# Patient Record
Sex: MALE | Race: WHITE | NOT HISPANIC OR LATINO | Employment: OTHER | URBAN - METROPOLITAN AREA
[De-identification: names, ages, dates, MRNs, and addresses within clinical notes are randomized per-mention and may not be internally consistent; named-entity substitution may affect disease eponyms.]

---

## 2020-12-16 ENCOUNTER — NON-PROVIDER VISIT (OUTPATIENT)
Dept: URGENT CARE | Facility: PHYSICIAN GROUP | Age: 70
End: 2020-12-16

## 2020-12-16 DIAGNOSIS — Z02.1 PRE-EMPLOYMENT DRUG SCREENING: ICD-10-CM

## 2020-12-16 LAB
AMP AMPHETAMINE: NORMAL
COC COCAINE: NORMAL
INT CON NEG: NORMAL
INT CON POS: NORMAL
MET METHAMPHETAMINES: NORMAL
OPI OPIATES: NORMAL
PCP PHENCYCLIDINE: NORMAL
POC DRUG COMMENT 753798-OCCUPATIONAL HEALTH: NEGATIVE
THC: NORMAL

## 2020-12-16 PROCEDURE — 80305 DRUG TEST PRSMV DIR OPT OBS: CPT | Performed by: NURSE PRACTITIONER

## 2021-01-15 DIAGNOSIS — Z23 NEED FOR VACCINATION: ICD-10-CM

## 2021-02-11 ENCOUNTER — HOSPITAL ENCOUNTER (EMERGENCY)
Facility: MEDICAL CENTER | Age: 71
End: 2021-02-11
Attending: EMERGENCY MEDICINE
Payer: MEDICARE

## 2021-02-11 ENCOUNTER — PHARMACY VISIT (OUTPATIENT)
Dept: PHARMACY | Facility: MEDICAL CENTER | Age: 71
End: 2021-02-11
Payer: COMMERCIAL

## 2021-02-11 VITALS
WEIGHT: 201.72 LBS | BODY MASS INDEX: 25.89 KG/M2 | HEIGHT: 74 IN | HEART RATE: 89 BPM | OXYGEN SATURATION: 99 % | RESPIRATION RATE: 16 BRPM | DIASTOLIC BLOOD PRESSURE: 84 MMHG | TEMPERATURE: 97.2 F | SYSTOLIC BLOOD PRESSURE: 130 MMHG

## 2021-02-11 DIAGNOSIS — Z76.0 MEDICATION REFILL: ICD-10-CM

## 2021-02-11 PROCEDURE — RXMED WILLOW AMBULATORY MEDICATION CHARGE: Performed by: EMERGENCY MEDICINE

## 2021-02-11 PROCEDURE — 99283 EMERGENCY DEPT VISIT LOW MDM: CPT

## 2021-02-11 RX ORDER — SPIRONOLACTONE AND HYDROCHLOROTHIAZIDE 25; 25 MG/1; MG/1
1 TABLET ORAL
COMMUNITY
End: 2021-02-11 | Stop reason: SDUPTHER

## 2021-02-11 RX ORDER — SPIRONOLACTONE AND HYDROCHLOROTHIAZIDE 25; 25 MG/1; MG/1
1 TABLET ORAL DAILY
Qty: 120 TABLET | Refills: 0 | Status: SHIPPED
Start: 2021-02-11 | End: 2024-01-25

## 2021-02-11 RX ORDER — LISINOPRIL 20 MG/1
20 TABLET ORAL DAILY
Qty: 30 TABLET | Refills: 0 | Status: SHIPPED
Start: 2021-02-11 | End: 2024-01-25

## 2021-02-11 RX ORDER — LISINOPRIL 20 MG/1
20 TABLET ORAL DAILY
COMMUNITY
End: 2021-02-11 | Stop reason: SDUPTHER

## 2021-02-11 RX ORDER — SPIRONOLACTONE AND HYDROCHLOROTHIAZIDE 25; 25 MG/1; MG/1
1 TABLET ORAL DAILY
Qty: 120 TABLET | Refills: 0 | Status: SHIPPED | OUTPATIENT
Start: 2021-02-11 | End: 2021-02-11 | Stop reason: SDUPTHER

## 2021-02-11 RX ORDER — LISINOPRIL 20 MG/1
20 TABLET ORAL DAILY
Qty: 30 TABLET | Refills: 0 | Status: SHIPPED | OUTPATIENT
Start: 2021-02-11 | End: 2021-02-11 | Stop reason: SDUPTHER

## 2021-02-11 NOTE — ED NOTES
.Patient discharged in stable condition per orders. Patient verbalized understanding of all discharge instructions. All belongings accounted for. Pt will  prescriptions at [facility]

## 2021-02-11 NOTE — ED NOTES
Pt states he can not afford to fill RXs until next week. Contacted social work for recommendations to fill RXs in the mean time

## 2021-02-11 NOTE — ED TRIAGE NOTES
"Chief Complaint   Patient presents with   • Medication Refill     Ran out of HTN RX two days ago     /93   Pulse 99   Temp 36.6 °C (97.9 °F) (Temporal)   Resp 20   Ht 1.88 m (6' 2\")   Wt 91.5 kg (201 lb 11.5 oz)   SpO2 95%   BMI 25.90 kg/m²     71 y/o male presents to ED requesting medication refill for his HCTZ and lisinopril. Patient states he ran out of the medications two days ago, but has been taking meds as prescribed. They were last filled in November when he was discharged from [facility]. He denies any other pain or medical complaints. VSS in triage.   "

## 2021-02-11 NOTE — DISCHARGE PLANNING
ALEXANDRIA called [facility] and was updated that Pt's medication cost is $17.76. ALEXANDRIA informed Pharmacy staff that this Pt would come and  his medications from the office.  Approved Services was completed and faxed to the [facility].  ALEXANDRIA noted that Pt has been advised to follow up with  [facility] for PCP and medications. ALEXANDRIA updated ER RN with plan.

## 2021-02-11 NOTE — ED PROVIDER NOTES
"ED Provider Note    CHIEF COMPLAINT  Chief Complaint   Patient presents with   • Medication Refill     Ran out of HTN RX two days ago       HPI  Fausto Martin is a 70 y.o. male who presents with high blood pressure.  The patient states he has been out of his lisinopril and hydrochlorothiazide over the last 2 days.  He presents asking for a refill.  The patient has not any chest pain or difficulty breathing.  He is unaware of any recent fever or sick contacts.    REVIEW OF SYSTEMS  No lower extremity swelling or pain    PHYSICAL EXAM  VITAL SIGNS: /93   Pulse 99   Temp 36.6 °C (97.9 °F) (Temporal)   Resp 20   Ht 1.88 m (6' 2\")   Wt 91.5 kg (201 lb 11.5 oz)   SpO2 95%   BMI 25.90 kg/m²   In general the patient does not appear toxic    Pulmonary chest clear to auscultation bilaterally    Cardiovascular S1-S2 with regular rate and rhythm    Extremities no edema      COURSE & MEDICAL DECISION MAKING  Pertinent Labs & Imaging studies reviewed. (See chart for details)  This a 70-year-old male who presents the emerge department with hypertension.  Will prescribe the patient's lisinopril as well as hydrochlorothiazide.  He is instructed to call the [facility] for routine health maintenance and follow-up.    FINAL IMPRESSION  1.  Hypertension       Disposition  Disposition      Electronically signed by: Issa Mckinney M.D., 2/11/2021 12:51 PM      "

## 2021-12-02 ENCOUNTER — HOSPITAL ENCOUNTER (EMERGENCY)
Facility: MEDICAL CENTER | Age: 71
End: 2021-12-02
Attending: EMERGENCY MEDICINE
Payer: MEDICARE

## 2021-12-02 VITALS
DIASTOLIC BLOOD PRESSURE: 60 MMHG | HEIGHT: 72 IN | HEART RATE: 91 BPM | WEIGHT: 212.52 LBS | OXYGEN SATURATION: 99 % | SYSTOLIC BLOOD PRESSURE: 180 MMHG | BODY MASS INDEX: 28.79 KG/M2 | TEMPERATURE: 98 F | RESPIRATION RATE: 16 BRPM

## 2021-12-02 DIAGNOSIS — M54.50 ACUTE RIGHT-SIDED LOW BACK PAIN WITHOUT SCIATICA: ICD-10-CM

## 2021-12-02 DIAGNOSIS — K59.00 CONSTIPATION, UNSPECIFIED CONSTIPATION TYPE: ICD-10-CM

## 2021-12-02 LAB
ALBUMIN SERPL BCP-MCNC: 4.5 G/DL (ref 3.2–4.9)
ALBUMIN/GLOB SERPL: 1.3 G/DL
ALP SERPL-CCNC: 88 U/L (ref 30–99)
ALT SERPL-CCNC: 22 U/L (ref 2–50)
ANION GAP SERPL CALC-SCNC: 11 MMOL/L (ref 7–16)
APPEARANCE UR: CLEAR
AST SERPL-CCNC: 20 U/L (ref 12–45)
BACTERIA #/AREA URNS HPF: NEGATIVE /HPF
BASOPHILS # BLD AUTO: 0.6 % (ref 0–1.8)
BASOPHILS # BLD: 0.06 K/UL (ref 0–0.12)
BILIRUB SERPL-MCNC: 1.5 MG/DL (ref 0.1–1.5)
BILIRUB UR QL STRIP.AUTO: NEGATIVE
BUN SERPL-MCNC: 22 MG/DL (ref 8–22)
CALCIUM SERPL-MCNC: 9.5 MG/DL (ref 8.5–10.5)
CHLORIDE SERPL-SCNC: 104 MMOL/L (ref 96–112)
CO2 SERPL-SCNC: 26 MMOL/L (ref 20–33)
COLOR UR: YELLOW
CREAT SERPL-MCNC: 1.16 MG/DL (ref 0.5–1.4)
EOSINOPHIL # BLD AUTO: 0.04 K/UL (ref 0–0.51)
EOSINOPHIL NFR BLD: 0.4 % (ref 0–6.9)
EPI CELLS #/AREA URNS HPF: NEGATIVE /HPF
ERYTHROCYTE [DISTWIDTH] IN BLOOD BY AUTOMATED COUNT: 42.6 FL (ref 35.9–50)
GLOBULIN SER CALC-MCNC: 3.4 G/DL (ref 1.9–3.5)
GLUCOSE SERPL-MCNC: 124 MG/DL (ref 65–99)
GLUCOSE UR STRIP.AUTO-MCNC: NEGATIVE MG/DL
HCT VFR BLD AUTO: 51.1 % (ref 42–52)
HGB BLD-MCNC: 17.7 G/DL (ref 14–18)
HYALINE CASTS #/AREA URNS LPF: ABNORMAL /LPF
IMM GRANULOCYTES # BLD AUTO: 0.04 K/UL (ref 0–0.11)
IMM GRANULOCYTES NFR BLD AUTO: 0.4 % (ref 0–0.9)
KETONES UR STRIP.AUTO-MCNC: NEGATIVE MG/DL
LEUKOCYTE ESTERASE UR QL STRIP.AUTO: ABNORMAL
LIPASE SERPL-CCNC: 27 U/L (ref 11–82)
LYMPHOCYTES # BLD AUTO: 0.95 K/UL (ref 1–4.8)
LYMPHOCYTES NFR BLD: 9.6 % (ref 22–41)
MCH RBC QN AUTO: 32.3 PG (ref 27–33)
MCHC RBC AUTO-ENTMCNC: 34.6 G/DL (ref 33.7–35.3)
MCV RBC AUTO: 93.2 FL (ref 81.4–97.8)
MICRO URNS: ABNORMAL
MONOCYTES # BLD AUTO: 0.61 K/UL (ref 0–0.85)
MONOCYTES NFR BLD AUTO: 6.2 % (ref 0–13.4)
NEUTROPHILS # BLD AUTO: 8.18 K/UL (ref 1.82–7.42)
NEUTROPHILS NFR BLD: 82.8 % (ref 44–72)
NITRITE UR QL STRIP.AUTO: NEGATIVE
NRBC # BLD AUTO: 0 K/UL
NRBC BLD-RTO: 0 /100 WBC
PH UR STRIP.AUTO: 6.5 [PH] (ref 5–8)
PLATELET # BLD AUTO: 210 K/UL (ref 164–446)
PMV BLD AUTO: 11 FL (ref 9–12.9)
POTASSIUM SERPL-SCNC: 3.8 MMOL/L (ref 3.6–5.5)
PROT SERPL-MCNC: 7.9 G/DL (ref 6–8.2)
PROT UR QL STRIP: NEGATIVE MG/DL
RBC # BLD AUTO: 5.48 M/UL (ref 4.7–6.1)
RBC # URNS HPF: ABNORMAL /HPF
RBC UR QL AUTO: NEGATIVE
SODIUM SERPL-SCNC: 141 MMOL/L (ref 135–145)
SP GR UR STRIP.AUTO: 1.02
UROBILINOGEN UR STRIP.AUTO-MCNC: 1 MG/DL
WBC # BLD AUTO: 9.9 K/UL (ref 4.8–10.8)
WBC #/AREA URNS HPF: ABNORMAL /HPF

## 2021-12-02 PROCEDURE — 83690 ASSAY OF LIPASE: CPT

## 2021-12-02 PROCEDURE — 99285 EMERGENCY DEPT VISIT HI MDM: CPT

## 2021-12-02 PROCEDURE — 81001 URINALYSIS AUTO W/SCOPE: CPT

## 2021-12-02 PROCEDURE — 80053 COMPREHEN METABOLIC PANEL: CPT

## 2021-12-02 PROCEDURE — 87086 URINE CULTURE/COLONY COUNT: CPT

## 2021-12-02 PROCEDURE — 85025 COMPLETE CBC W/AUTO DIFF WBC: CPT

## 2021-12-02 RX ORDER — DOCUSATE SODIUM 100 MG/1
100 CAPSULE, LIQUID FILLED ORAL 2 TIMES DAILY
Qty: 60 CAPSULE | Refills: 0 | Status: SHIPPED | OUTPATIENT
Start: 2021-12-02 | End: 2024-01-25

## 2021-12-02 RX ORDER — METHYLPREDNISOLONE 4 MG/1
TABLET ORAL
Qty: 21 TABLET | Refills: 0 | Status: SHIPPED | OUTPATIENT
Start: 2021-12-02 | End: 2024-01-25

## 2021-12-02 ASSESSMENT — LIFESTYLE VARIABLES: DO YOU DRINK ALCOHOL: NO

## 2021-12-02 NOTE — ED PROVIDER NOTES
ED Provider Note    Scribed for Buck Diaz M.D. by Brent Ledezma. 12/2/2021  8:25 AM    Primary care provider: Ida Severino P.A.-C.  Means of arrival: Walk In  History obtained from: Patient  History limited by: None    CHIEF COMPLAINT  Chief Complaint   Patient presents with    Constipation    Low Back Pain    Abdominal Pain     HPI  Fausto Martin is a 71 y.o. male who presents to the Emergency Department for worsening constipation onset several months ago. He states his last bowel movement was 4 days ago. He has tried laxatives with minimal relief. The patient states he also has acute on chronic moderate to severe left low back pain that radiates to the right low back. The patient denies any significant event that exacerbated his low back pain. 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 He denies any major abdominal or back surgeries. No reports of radiating lower extremity pain.    REVIEW OF SYSTEMS  Pertinent positives include constipation, low back pain, abdominal pain.   Pertinent negatives include no radiating lower extremity pain.    All other systems reviewed and negative. See HPI for further details.     PAST MEDICAL HISTORY   has a past medical history of Hypertension.    SURGICAL HISTORY  patient denies any surgical history    SOCIAL HISTORY  Social History     Tobacco Use    Smoking status: Never Smoker    Smokeless tobacco: Never Used   Vaping Use    Vaping Use: Never used   Substance Use Topics    Alcohol use: Not Currently    Drug use: Never      Social History     Substance and Sexual Activity   Drug Use Never       FAMILY HISTORY  History reviewed. No pertinent family history.    CURRENT MEDICATIONS  Current Outpatient Medications   Medication Instructions    lisinopril (PRINIVIL) 20 mg, Oral, DAILY    spironolactone/hctz (ALDACTAZIDE) 25-25 MG Tab 1 Tablet, Oral, DAILY     ALLERGIES  No Known Allergies    PHYSICAL EXAM  VITAL SIGNS: BP (!) 188/113   Pulse 91    Temp (!) 35.6 °C (96.1 °F) (Temporal)   Resp 16   Ht 1.829 m (6')   Wt 96.4 kg (212 lb 8.4 oz)   SpO2 97%   BMI 28.82 kg/m²     Nursing note and vitals reviewed.  Constitutional: Well-developed and well-nourished. No distress.   HENT: Head is normocephalic and atraumatic. Oropharynx is clear and moist without exudate or erythema.   Eyes: Pupils are equal, round, and reactive to light. Conjunctiva are normal.   Cardiovascular: Normal rate and regular rhythm. No murmur heard. Normal radial pulses.  Pulmonary/Chest: Breath sounds normal. No wheezes or rales.   Abdominal: Soft and Cannot elicit any abdominal tenderness. No distention    Musculoskeletal: Extremities exhibit normal range of motion without edema.  Back: Tenderness in right lumbar paraspinal musculature. Apparent discomfort with movement.  Neurological: No saddle anesthesia. Awake, alert and oriented to person, place, and time. No focal deficits noted.  Skin: Skin is warm and dry. No rash.   Psychiatric: Normal mood and affect. Appropriate for clinical situation.    DIAGNOSTIC STUDIES / PROCEDURES    LABS  Results for orders placed or performed during the hospital encounter of 12/02/21   CBC WITH DIFFERENTIAL   Result Value Ref Range    WBC 9.9 4.8 - 10.8 K/uL    RBC 5.48 4.70 - 6.10 M/uL    Hemoglobin 17.7 14.0 - 18.0 g/dL    Hematocrit 51.1 42.0 - 52.0 %    MCV 93.2 81.4 - 97.8 fL    MCH 32.3 27.0 - 33.0 pg    MCHC 34.6 33.7 - 35.3 g/dL    RDW 42.6 35.9 - 50.0 fL    Platelet Count 210 164 - 446 K/uL    MPV 11.0 9.0 - 12.9 fL    Neutrophils-Polys 82.80 (H) 44.00 - 72.00 %    Lymphocytes 9.60 (L) 22.00 - 41.00 %    Monocytes 6.20 0.00 - 13.40 %    Eosinophils 0.40 0.00 - 6.90 %    Basophils 0.60 0.00 - 1.80 %    Immature Granulocytes 0.40 0.00 - 0.90 %    Nucleated RBC 0.00 /100 WBC    Neutrophils (Absolute) 8.18 (H) 1.82 - 7.42 K/uL    Lymphs (Absolute) 0.95 (L) 1.00 - 4.80 K/uL    Monos (Absolute) 0.61 0.00 - 0.85 K/uL    Eos (Absolute) 0.04 0.00 -  0.51 K/uL    Baso (Absolute) 0.06 0.00 - 0.12 K/uL    Immature Granulocytes (abs) 0.04 0.00 - 0.11 K/uL    NRBC (Absolute) 0.00 K/uL   COMP METABOLIC PANEL   Result Value Ref Range    Sodium 141 135 - 145 mmol/L    Potassium 3.8 3.6 - 5.5 mmol/L    Chloride 104 96 - 112 mmol/L    Co2 26 20 - 33 mmol/L    Anion Gap 11.0 7.0 - 16.0    Glucose 124 (H) 65 - 99 mg/dL    Bun 22 8 - 22 mg/dL    Creatinine 1.16 0.50 - 1.40 mg/dL    Calcium 9.5 8.5 - 10.5 mg/dL    AST(SGOT) 20 12 - 45 U/L    ALT(SGPT) 22 2 - 50 U/L    Alkaline Phosphatase 88 30 - 99 U/L    Total Bilirubin 1.5 0.1 - 1.5 mg/dL    Albumin 4.5 3.2 - 4.9 g/dL    Total Protein 7.9 6.0 - 8.2 g/dL    Globulin 3.4 1.9 - 3.5 g/dL    A-G Ratio 1.3 g/dL   LIPASE   Result Value Ref Range    Lipase 27 11 - 82 U/L   URINALYSIS    Specimen: Blood   Result Value Ref Range    Color Yellow     Character Clear     Specific Gravity 1.024 <1.035    Ph 6.5 5.0 - 8.0    Glucose Negative Negative mg/dL    Ketones Negative Negative mg/dL    Protein Negative Negative mg/dL    Bilirubin Negative Negative    Urobilinogen, Urine 1.0 Negative    Nitrite Negative Negative    Leukocyte Esterase Small (A) Negative    Occult Blood Negative Negative    Micro Urine Req Microscopic    URINE MICROSCOPIC (W/UA)   Result Value Ref Range    WBC 10-20 (A) /hpf    RBC 2-5 (A) /hpf    Bacteria Negative None /hpf    Epithelial Cells Negative /hpf    Hyaline Cast 0-2 /lpf   ESTIMATED GFR   Result Value Ref Range    GFR If African American >60 >60 mL/min/1.73 m 2    GFR If Non African American >60 >60 mL/min/1.73 m 2       All labs reviewed by me.    COURSE & MEDICAL DECISION MAKING  Nursing notes, VS, PMSFHx reviewed in chart.      8:25 AM - Patient seen and examined at bedside. Urine Culture, CBC w/ diff, CMP, Lipase, and UA were ordered in triage. I discussed the patient's diagnostic study results.  Laboratory studies are reassuring.  The patient has an entirely benign abdominal exam.  I discussed plan  for discharge and follow up as outlined below. He will be treated symptomatically for mechanical back pain and constipation. Patient will be discharged and prescribed magnesium citrate, Colace 100 mg Capsule, and Medrol 4 mg Tab. The patient verbalizes they feel comfortable going home. The patient is stable for discharge at this time and will return for any new or worsening symptoms. Patient verbalizes understanding and support with my plan for discharge.     The patient will return for new or worsening symptoms and is stable at the time of discharge.    DISPOSITION:  Patient will be discharged home in stable condition.    FOLLOW UP:  Ida Severino P.A.-C.  [address]  [place] [address]  [phone]    Schedule an appointment as soon as possible for a visit       [facility], Emergency Dept  [address] [facility] [id number] [phone]    If symptoms worsen      OUTPATIENT MEDICATIONS:  New Prescriptions    DOCUSATE SODIUM (COLACE) 100 MG CAP    Take 1 Capsule by mouth 2 times a day.    MAGNESIUM CITRATE SOLUTION    Take 300 mL by mouth one time for 1 dose.    METHYLPREDNISOLONE (MEDROL) 4 MG TAB    Take as per the package instructions.       FINAL IMPRESSION  1. Constipation, unspecified constipation type    2. Acute right-sided low back pain without sciatica          Brent LOPEZ (Priscilla), am scribing for, and in the presence of, Buck Diaz M.D..    Electronically signed by: Brent Ledezma (Priscilla), 12/2/2021    Buck LOPEZ M.D. personally performed the services described in this documentation, as scribed by Brent Ledezma in my presence, and it is both accurate and complete.    The note accurately reflects work and decisions made by me.  Buck Diaz M.D.  12/2/2021  2:32 PM

## 2021-12-02 NOTE — ED TRIAGE NOTES
Chief Complaint   Patient presents with   • Constipation   • Low Back Pain   • Abdominal Pain     Pt to ED from home c/o b/l lwr abd/back pain that waxes and wanes but has become more intense in the last 24 hours. Pt has not had a BM since Sunday c/o 10/10. Denies N/V/D.  Pt hypertensive in triage.     BP (!) 167/117 Comment: L arm  Pulse 91   Temp (!) 35.6 °C (96.1 °F) (Temporal)   Resp 16   Ht 1.829 m (6')   Wt 96.4 kg (212 lb 8.4 oz)   SpO2 97%   BMI 28.82 kg/m²

## 2021-12-04 LAB
BACTERIA UR CULT: NORMAL
SIGNIFICANT IND 70042: NORMAL
SITE SITE: NORMAL
SOURCE SOURCE: NORMAL

## 2021-12-10 ENCOUNTER — APPOINTMENT (OUTPATIENT)
Dept: RADIOLOGY | Facility: MEDICAL CENTER | Age: 71
End: 2021-12-10
Attending: EMERGENCY MEDICINE
Payer: MEDICARE

## 2021-12-10 ENCOUNTER — HOSPITAL ENCOUNTER (EMERGENCY)
Facility: MEDICAL CENTER | Age: 71
End: 2021-12-10
Attending: EMERGENCY MEDICINE
Payer: MEDICARE

## 2021-12-10 VITALS
TEMPERATURE: 98.1 F | HEART RATE: 88 BPM | BODY MASS INDEX: 28.73 KG/M2 | OXYGEN SATURATION: 93 % | SYSTOLIC BLOOD PRESSURE: 181 MMHG | WEIGHT: 212.08 LBS | RESPIRATION RATE: 18 BRPM | HEIGHT: 72 IN | DIASTOLIC BLOOD PRESSURE: 101 MMHG

## 2021-12-10 DIAGNOSIS — Z87.19 HISTORY OF CONSTIPATION: ICD-10-CM

## 2021-12-10 DIAGNOSIS — R10.84 GENERALIZED ABDOMINAL PAIN: ICD-10-CM

## 2021-12-10 LAB
ALBUMIN SERPL BCP-MCNC: 4.3 G/DL (ref 3.2–4.9)
ALBUMIN/GLOB SERPL: 1.6 G/DL
ALP SERPL-CCNC: 73 U/L (ref 30–99)
ALT SERPL-CCNC: 21 U/L (ref 2–50)
ANION GAP SERPL CALC-SCNC: 12 MMOL/L (ref 7–16)
AST SERPL-CCNC: 13 U/L (ref 12–45)
BASOPHILS # BLD AUTO: 0.5 % (ref 0–1.8)
BASOPHILS # BLD: 0.07 K/UL (ref 0–0.12)
BILIRUB SERPL-MCNC: 1.5 MG/DL (ref 0.1–1.5)
BUN SERPL-MCNC: 21 MG/DL (ref 8–22)
CALCIUM SERPL-MCNC: 9.1 MG/DL (ref 8.5–10.5)
CHLORIDE SERPL-SCNC: 103 MMOL/L (ref 96–112)
CO2 SERPL-SCNC: 23 MMOL/L (ref 20–33)
CREAT SERPL-MCNC: 1.14 MG/DL (ref 0.5–1.4)
EOSINOPHIL # BLD AUTO: 0.07 K/UL (ref 0–0.51)
EOSINOPHIL NFR BLD: 0.5 % (ref 0–6.9)
ERYTHROCYTE [DISTWIDTH] IN BLOOD BY AUTOMATED COUNT: 42.7 FL (ref 35.9–50)
GLOBULIN SER CALC-MCNC: 2.7 G/DL (ref 1.9–3.5)
GLUCOSE SERPL-MCNC: 103 MG/DL (ref 65–99)
HCT VFR BLD AUTO: 52.4 % (ref 42–52)
HGB BLD-MCNC: 17.7 G/DL (ref 14–18)
IMM GRANULOCYTES # BLD AUTO: 0.06 K/UL (ref 0–0.11)
IMM GRANULOCYTES NFR BLD AUTO: 0.4 % (ref 0–0.9)
LYMPHOCYTES # BLD AUTO: 1.34 K/UL (ref 1–4.8)
LYMPHOCYTES NFR BLD: 9.9 % (ref 22–41)
MCH RBC QN AUTO: 32.2 PG (ref 27–33)
MCHC RBC AUTO-ENTMCNC: 33.8 G/DL (ref 33.7–35.3)
MCV RBC AUTO: 95.3 FL (ref 81.4–97.8)
MONOCYTES # BLD AUTO: 1.15 K/UL (ref 0–0.85)
MONOCYTES NFR BLD AUTO: 8.5 % (ref 0–13.4)
NEUTROPHILS # BLD AUTO: 10.78 K/UL (ref 1.82–7.42)
NEUTROPHILS NFR BLD: 80.2 % (ref 44–72)
NRBC # BLD AUTO: 0 K/UL
NRBC BLD-RTO: 0 /100 WBC
PLATELET # BLD AUTO: 230 K/UL (ref 164–446)
PMV BLD AUTO: 10.8 FL (ref 9–12.9)
POTASSIUM SERPL-SCNC: 4.1 MMOL/L (ref 3.6–5.5)
PROT SERPL-MCNC: 7 G/DL (ref 6–8.2)
RBC # BLD AUTO: 5.5 M/UL (ref 4.7–6.1)
SODIUM SERPL-SCNC: 138 MMOL/L (ref 135–145)
WBC # BLD AUTO: 13.5 K/UL (ref 4.8–10.8)

## 2021-12-10 PROCEDURE — 74177 CT ABD & PELVIS W/CONTRAST: CPT | Mod: MG

## 2021-12-10 PROCEDURE — 36415 COLL VENOUS BLD VENIPUNCTURE: CPT

## 2021-12-10 PROCEDURE — 85025 COMPLETE CBC W/AUTO DIFF WBC: CPT

## 2021-12-10 PROCEDURE — 80053 COMPREHEN METABOLIC PANEL: CPT

## 2021-12-10 PROCEDURE — 99284 EMERGENCY DEPT VISIT MOD MDM: CPT

## 2021-12-10 PROCEDURE — 700117 HCHG RX CONTRAST REV CODE 255: Performed by: EMERGENCY MEDICINE

## 2021-12-10 RX ADMIN — IOHEXOL 100 ML: 350 INJECTION, SOLUTION INTRAVENOUS at 14:15

## 2021-12-10 ASSESSMENT — FIBROSIS 4 INDEX: FIB4 SCORE: 1.44

## 2021-12-10 NOTE — ED PROVIDER NOTES
ED Provider Note    Scribed for Buck Diaz M.D. by Nuria Adair. 12/10/2021  11:19 AM    Primary care provider: Ida Severino P.A.-C.  Means of arrival: Walk-In  History obtained from: Patient  History limited by: None    CHIEF COMPLAINT  Chief Complaint   Patient presents with   • Constipation     chronic for the last 5 months, otc not working       HPI  Fausto Martin is a 71 y.o. male who presents to the Emergency Department for evaluation of chronic constipation onset 5 months ago. Patient was seen here last week for similar complaints and was treated with Magnesium Citrate. He had a good response to the Magnesium citrate initially however his symptoms returned. He has associated decreased appetite and left lower quadrant abdominal pain. He denies fever.    REVIEW OF SYSTEMS  Pertinent positives include constipation, decreased appetite, and abdominal pain.   Pertinent negatives include no fever.    All other systems reviewed and negative. See HPI for further details.     PAST MEDICAL HISTORY   has a past medical history of Hypertension.    SURGICAL HISTORY  patient denies any surgical history    SOCIAL HISTORY  Social History     Tobacco Use   • Smoking status: Never Smoker   • Smokeless tobacco: Never Used   Vaping Use   • Vaping Use: Never used   Substance Use Topics   • Alcohol use: Not Currently   • Drug use: Never      Social History     Substance and Sexual Activity   Drug Use Never       FAMILY HISTORY  None noted    CURRENT MEDICATIONS  Current Outpatient Medications   Medication Instructions   • docusate sodium (COLACE) 100 mg, Oral, 2 TIMES DAILY   • lisinopril (PRINIVIL) 20 mg, Oral, DAILY   • methylPREDNISolone (MEDROL) 4 MG Tab Take as per the package instructions.   • spironolactone/hctz (ALDACTAZIDE) 25-25 MG Tab 1 Tablet, Oral, DAILY     ALLERGIES  No Known Allergies    PHYSICAL EXAM  VITAL SIGNS: BP (!) 165/107   Pulse (!) 106   Temp 36.2 °C (97.2 °F) (Temporal)   Resp (!) 22    Ht 1.829 m (6')   Wt 96.2 kg (212 lb 1.3 oz)   SpO2 97%   BMI 28.76 kg/m²   Nursing note and vitals reviewed.  Constitutional: Well-developed and well-nourished. Mild distress.   HENT: Head is normocephalic and atraumatic. Oropharynx is clear and moist without exudate or erythema.   Eyes: Pupils are equal, round, and reactive to light. Conjunctiva are normal.   Cardiovascular: Normal rate and regular rhythm. No murmur heard. Normal radial pulses.  Pulmonary/Chest: Breath sounds normal. No wheezes or rales.   Abdominal: Mild tenderness to the left lower quadrant. Soft. No distention    Musculoskeletal: Extremities exhibit normal range of motion without edema or tenderness.   Neurological: Awake, alert and oriented to person, place, and time. No focal deficits noted.  Skin: Skin is warm and dry. No rash.   Psychiatric: Normal mood and affect. Appropriate for clinical situation.     DIAGNOSTIC STUDIES / PROCEDURES    LABS  Results for orders placed or performed during the hospital encounter of 12/10/21   CBC WITH DIFFERENTIAL   Result Value Ref Range    WBC 13.5 (H) 4.8 - 10.8 K/uL    RBC 5.50 4.70 - 6.10 M/uL    Hemoglobin 17.7 14.0 - 18.0 g/dL    Hematocrit 52.4 (H) 42.0 - 52.0 %    MCV 95.3 81.4 - 97.8 fL    MCH 32.2 27.0 - 33.0 pg    MCHC 33.8 33.7 - 35.3 g/dL    RDW 42.7 35.9 - 50.0 fL    Platelet Count 230 164 - 446 K/uL    MPV 10.8 9.0 - 12.9 fL    Neutrophils-Polys 80.20 (H) 44.00 - 72.00 %    Lymphocytes 9.90 (L) 22.00 - 41.00 %    Monocytes 8.50 0.00 - 13.40 %    Eosinophils 0.50 0.00 - 6.90 %    Basophils 0.50 0.00 - 1.80 %    Immature Granulocytes 0.40 0.00 - 0.90 %    Nucleated RBC 0.00 /100 WBC    Neutrophils (Absolute) 10.78 (H) 1.82 - 7.42 K/uL    Lymphs (Absolute) 1.34 1.00 - 4.80 K/uL    Monos (Absolute) 1.15 (H) 0.00 - 0.85 K/uL    Eos (Absolute) 0.07 0.00 - 0.51 K/uL    Baso (Absolute) 0.07 0.00 - 0.12 K/uL    Immature Granulocytes (abs) 0.06 0.00 - 0.11 K/uL    NRBC (Absolute) 0.00 K/uL    Comp Metabolic Panel   Result Value Ref Range    Sodium 138 135 - 145 mmol/L    Potassium 4.1 3.6 - 5.5 mmol/L    Chloride 103 96 - 112 mmol/L    Co2 23 20 - 33 mmol/L    Anion Gap 12.0 7.0 - 16.0    Glucose 103 (H) 65 - 99 mg/dL    Bun 21 8 - 22 mg/dL    Creatinine 1.14 0.50 - 1.40 mg/dL    Calcium 9.1 8.5 - 10.5 mg/dL    AST(SGOT) 13 12 - 45 U/L    ALT(SGPT) 21 2 - 50 U/L    Alkaline Phosphatase 73 30 - 99 U/L    Total Bilirubin 1.5 0.1 - 1.5 mg/dL    Albumin 4.3 3.2 - 4.9 g/dL    Total Protein 7.0 6.0 - 8.2 g/dL    Globulin 2.7 1.9 - 3.5 g/dL    A-G Ratio 1.6 g/dL   ESTIMATED GFR   Result Value Ref Range    GFR If African American >60 >60 mL/min/1.73 m 2    GFR If Non African American >60 >60 mL/min/1.73 m 2      All labs reviewed by me.    RADIOLOGY  CT-ABDOMEN-PELVIS WITH   Final Result      1.  Small hepatic cyst.      2.  Small right renal cysts.      3.  Moderate atherosclerotic changes of the infrarenal abdominal aorta and iliac arteries. Mild ectasia of infrarenal aorta with greatest diameter of 2.9 cm.      4.  Scattered sigmoid diverticula.        The radiologist's interpretation of all radiological studies have been reviewed by me.    COURSE & MEDICAL DECISION MAKING  Nursing notes, VS, PMSFHx reviewed in chart.     Review of past medical records shows the patient was seen here last week for similar complaints. He was treated with Magnesium Citrate.      11:19 AM - Patient seen and examined at bedside. Discussed plan of care including CT scans. Ordered CT-Abdomen-Pelvis, CBC w/ diff, and CMP to evaluate his symptoms. The differential diagnoses include but are not limited to: Diverticulitis, Colitis, Constipation    2:33 PM laboratory studies are remarkable for mild leukocytosis.  I do note that the patient was recently on a Medrol Dosepak for his back pain.  Other laboratory studies are unremarkable.  CT scan of the abdomen and pelvis is obtained given the patient's persistent history of abdominal  discomfort.  This does not demonstrate any evidence of diverticulitis, no evidence of colitis.  Patient does not have a significant stool burden.  He does state that he has not been eating well recently.  Decreased stool output may be simply secondary to poor oral intake.  Discussed this with the patient.  He is discharged home in stable condition.  Return precautions discussed.  Patient verbalized understanding.    The patient will return for new or worsening symptoms and is stable at the time of discharge.    The patient is referred to a primary physician for blood pressure management, diabetic screening, and for all other preventative health concerns.    DISPOSITION:  Patient will be discharged home in stable condition.    FOLLOW UP:  Ida Severino P.A.-C.  [address]  [facility] [id number] [phone]    Schedule an appointment as soon as possible for a visit       [facility], Emergency Dept  [address] [facility] [address] [phone]    If symptoms worsen      OUTPATIENT MEDICATIONS:  New Prescriptions    No medications on file         FINAL IMPRESSION  1. Generalized abdominal pain    2. History of constipation          Nuria LOPEZ (Priscilla), am scribing for, and in the presence of, Buck Diaz M.D..    Electronically signed by: Nuria Burrows), 12/10/2021    Buck LOPEZ M.D. personally performed the services described in this documentation, as scribed by Nuria Adair in my presence, and it is both accurate and complete.    The note accurately reflects work and decisions made by me.  Buck Diaz M.D.  12/10/2021  2:34 PM

## 2021-12-10 NOTE — ED TRIAGE NOTES
Chief Complaint   Patient presents with   • Constipation     chronic for the last 5 months, otc not working     tx last week here for same

## 2021-12-10 NOTE — ED NOTES
Discharge teaching and paperwork provided regarding abdominal pain and all questions/concerns answered. VSS, pain assessment stable and PIV removed. Given information regarding home care and reasons to follow up with ED or primary MD. Patient discharged to the care of self and ambulated out of the ED.

## 2022-02-04 ENCOUNTER — HOSPITAL ENCOUNTER (OUTPATIENT)
Dept: RADIOLOGY | Facility: MEDICAL CENTER | Age: 72
End: 2022-02-04
Payer: MEDICARE

## 2022-02-04 DIAGNOSIS — K59.00 CONSTIPATION, UNSPECIFIED CONSTIPATION TYPE: ICD-10-CM

## 2022-02-04 PROCEDURE — 74018 RADEX ABDOMEN 1 VIEW: CPT

## 2022-02-07 ENCOUNTER — HOSPITAL ENCOUNTER (OUTPATIENT)
Dept: RADIOLOGY | Facility: MEDICAL CENTER | Age: 72
End: 2022-02-07
Payer: MEDICARE

## 2022-02-07 DIAGNOSIS — K59.00 CONSTIPATION, UNSPECIFIED CONSTIPATION TYPE: ICD-10-CM

## 2022-02-07 PROCEDURE — 74018 RADEX ABDOMEN 1 VIEW: CPT

## 2022-02-09 ENCOUNTER — HOSPITAL ENCOUNTER (OUTPATIENT)
Dept: RADIOLOGY | Facility: MEDICAL CENTER | Age: 72
End: 2022-02-09
Payer: MEDICARE

## 2022-02-09 DIAGNOSIS — K59.00 CONSTIPATION, UNSPECIFIED CONSTIPATION TYPE: ICD-10-CM

## 2022-02-09 PROCEDURE — 74018 RADEX ABDOMEN 1 VIEW: CPT

## 2022-02-11 ENCOUNTER — HOSPITAL ENCOUNTER (OUTPATIENT)
Dept: RADIOLOGY | Facility: MEDICAL CENTER | Age: 72
End: 2022-02-11
Payer: MEDICARE

## 2022-02-11 DIAGNOSIS — K59.00 CONSTIPATION, UNSPECIFIED CONSTIPATION TYPE: ICD-10-CM

## 2022-02-11 PROCEDURE — 74018 RADEX ABDOMEN 1 VIEW: CPT

## 2022-11-04 ENCOUNTER — PATIENT MESSAGE (OUTPATIENT)
Dept: HEALTH INFORMATION MANAGEMENT | Facility: OTHER | Age: 72
End: 2022-11-04

## 2024-01-25 ENCOUNTER — APPOINTMENT (OUTPATIENT)
Dept: RADIOLOGY | Facility: MEDICAL CENTER | Age: 74
DRG: 640 | End: 2024-01-25
Attending: EMERGENCY MEDICINE
Payer: MEDICARE

## 2024-01-25 ENCOUNTER — HOSPITAL ENCOUNTER (INPATIENT)
Facility: MEDICAL CENTER | Age: 74
LOS: 3 days | DRG: 640 | End: 2024-01-28
Attending: EMERGENCY MEDICINE | Admitting: INTERNAL MEDICINE
Payer: MEDICARE

## 2024-01-25 DIAGNOSIS — K59.04 CHRONIC IDIOPATHIC CONSTIPATION: Chronic | ICD-10-CM

## 2024-01-25 DIAGNOSIS — E83.51 HYPOCALCEMIA: ICD-10-CM

## 2024-01-25 DIAGNOSIS — R10.84 GENERALIZED ABDOMINAL PAIN: ICD-10-CM

## 2024-01-25 DIAGNOSIS — R33.9 URINARY RETENTION: ICD-10-CM

## 2024-01-25 DIAGNOSIS — K40.90 UNILATERAL INGUINAL HERNIA WITHOUT OBSTRUCTION OR GANGRENE, RECURRENCE NOT SPECIFIED: ICD-10-CM

## 2024-01-25 DIAGNOSIS — E87.6 HYPOKALEMIA: ICD-10-CM

## 2024-01-25 DIAGNOSIS — N40.1 BENIGN PROSTATIC HYPERPLASIA WITH URINARY RETENTION: ICD-10-CM

## 2024-01-25 DIAGNOSIS — R65.10 SIRS (SYSTEMIC INFLAMMATORY RESPONSE SYNDROME) (HCC): ICD-10-CM

## 2024-01-25 DIAGNOSIS — K59.00 CONSTIPATION, UNSPECIFIED CONSTIPATION TYPE: ICD-10-CM

## 2024-01-25 DIAGNOSIS — J96.01 ACUTE HYPOXEMIC RESPIRATORY FAILURE (HCC): ICD-10-CM

## 2024-01-25 DIAGNOSIS — R33.8 BENIGN PROSTATIC HYPERPLASIA WITH URINARY RETENTION: ICD-10-CM

## 2024-01-25 DIAGNOSIS — I71.43 INFRARENAL ABDOMINAL AORTIC ANEURYSM (AAA) WITHOUT RUPTURE (HCC): ICD-10-CM

## 2024-01-25 PROBLEM — Z71.89 ADVANCED CARE PLANNING/COUNSELING DISCUSSION: Status: ACTIVE | Noted: 2024-01-25

## 2024-01-25 PROBLEM — Z87.891 HISTORY OF TOBACCO ABUSE: Chronic | Status: ACTIVE | Noted: 2024-01-25

## 2024-01-25 PROBLEM — F19.11 HISTORY OF DRUG ABUSE (HCC): Chronic | Status: ACTIVE | Noted: 2024-01-25

## 2024-01-25 PROBLEM — K57.90 DIVERTICULOSIS: Status: ACTIVE | Noted: 2024-01-25

## 2024-01-25 PROBLEM — E83.42 HYPOMAGNESEMIA: Status: ACTIVE | Noted: 2024-01-25

## 2024-01-25 PROBLEM — F12.10 MILD TETRAHYDROCANNABINOL (THC) ABUSE: Status: ACTIVE | Noted: 2024-01-25

## 2024-01-25 LAB
ALBUMIN SERPL BCP-MCNC: 2.6 G/DL (ref 3.2–4.9)
ALBUMIN/GLOB SERPL: 1.5 G/DL
ALP SERPL-CCNC: 49 U/L (ref 30–99)
ALT SERPL-CCNC: 16 U/L (ref 2–50)
ANION GAP SERPL CALC-SCNC: 8 MMOL/L (ref 7–16)
APPEARANCE UR: CLEAR
AST SERPL-CCNC: 12 U/L (ref 12–45)
BACTERIA #/AREA URNS HPF: NEGATIVE /HPF
BASOPHILS # BLD AUTO: 0.4 % (ref 0–1.8)
BASOPHILS # BLD: 0.06 K/UL (ref 0–0.12)
BILIRUB SERPL-MCNC: 1.2 MG/DL (ref 0.1–1.5)
BILIRUB UR QL STRIP.AUTO: NEGATIVE
BUN SERPL-MCNC: 14 MG/DL (ref 8–22)
CA-I SERPL-SCNC: 1.2 MMOL/L (ref 1.1–1.3)
CALCIUM ALBUM COR SERPL-MCNC: 6.7 MG/DL (ref 8.5–10.5)
CALCIUM SERPL-MCNC: 5.6 MG/DL (ref 8.5–10.5)
CHLORIDE SERPL-SCNC: 113 MMOL/L (ref 96–112)
CO2 SERPL-SCNC: 17 MMOL/L (ref 20–33)
COLOR UR: YELLOW
CREAT SERPL-MCNC: 0.67 MG/DL (ref 0.5–1.4)
EKG IMPRESSION: NORMAL
EOSINOPHIL # BLD AUTO: 0.07 K/UL (ref 0–0.51)
EOSINOPHIL NFR BLD: 0.5 % (ref 0–6.9)
EPI CELLS #/AREA URNS HPF: ABNORMAL /HPF
ERYTHROCYTE [DISTWIDTH] IN BLOOD BY AUTOMATED COUNT: 40.5 FL (ref 35.9–50)
EST. AVERAGE GLUCOSE BLD GHB EST-MCNC: 111 MG/DL
GFR SERPLBLD CREATININE-BSD FMLA CKD-EPI: 98 ML/MIN/1.73 M 2
GLOBULIN SER CALC-MCNC: 1.7 G/DL (ref 1.9–3.5)
GLUCOSE SERPL-MCNC: 116 MG/DL (ref 65–99)
GLUCOSE UR STRIP.AUTO-MCNC: NEGATIVE MG/DL
HBA1C MFR BLD: 5.5 % (ref 4–5.6)
HCT VFR BLD AUTO: 43.9 % (ref 42–52)
HGB BLD-MCNC: 15.5 G/DL (ref 14–18)
HYALINE CASTS #/AREA URNS LPF: ABNORMAL /LPF
IMM GRANULOCYTES # BLD AUTO: 0.06 K/UL (ref 0–0.11)
IMM GRANULOCYTES NFR BLD AUTO: 0.4 % (ref 0–0.9)
INR PPP: 1.12 (ref 0.87–1.13)
KETONES UR STRIP.AUTO-MCNC: 15 MG/DL
LACTATE SERPL-SCNC: 1.1 MMOL/L (ref 0.5–2)
LEUKOCYTE ESTERASE UR QL STRIP.AUTO: NEGATIVE
LIPASE SERPL-CCNC: 25 U/L (ref 11–82)
LYMPHOCYTES # BLD AUTO: 1.52 K/UL (ref 1–4.8)
LYMPHOCYTES NFR BLD: 9.9 % (ref 22–41)
MAGNESIUM SERPL-MCNC: 1.1 MG/DL (ref 1.5–2.5)
MCH RBC QN AUTO: 32 PG (ref 27–33)
MCHC RBC AUTO-ENTMCNC: 35.3 G/DL (ref 32.3–36.5)
MCV RBC AUTO: 90.5 FL (ref 81.4–97.8)
MICRO URNS: ABNORMAL
MONOCYTES # BLD AUTO: 1.05 K/UL (ref 0–0.85)
MONOCYTES NFR BLD AUTO: 6.8 % (ref 0–13.4)
NEUTROPHILS # BLD AUTO: 12.63 K/UL (ref 1.82–7.42)
NEUTROPHILS NFR BLD: 82 % (ref 44–72)
NITRITE UR QL STRIP.AUTO: NEGATIVE
NRBC # BLD AUTO: 0 K/UL
NRBC BLD-RTO: 0 /100 WBC (ref 0–0.2)
PH UR STRIP.AUTO: 5.5 [PH] (ref 5–8)
PLATELET # BLD AUTO: 219 K/UL (ref 164–446)
PMV BLD AUTO: 11 FL (ref 9–12.9)
POTASSIUM SERPL-SCNC: 2.8 MMOL/L (ref 3.6–5.5)
PROCALCITONIN SERPL-MCNC: 0.05 NG/ML
PROT SERPL-MCNC: 4.3 G/DL (ref 6–8.2)
PROT UR QL STRIP: NEGATIVE MG/DL
PROTHROMBIN TIME: 14.6 SEC (ref 12–14.6)
PTH-INTACT SERPL-MCNC: 26.3 PG/ML (ref 14–72)
RBC # BLD AUTO: 4.85 M/UL (ref 4.7–6.1)
RBC # URNS HPF: ABNORMAL /HPF
RBC UR QL AUTO: ABNORMAL
SODIUM SERPL-SCNC: 138 MMOL/L (ref 135–145)
SP GR UR STRIP.AUTO: <=1.005
TROPONIN T SERPL-MCNC: 8 NG/L (ref 6–19)
UROBILINOGEN UR STRIP.AUTO-MCNC: 0.2 MG/DL
WBC # BLD AUTO: 15.4 K/UL (ref 4.8–10.8)
WBC #/AREA URNS HPF: ABNORMAL /HPF

## 2024-01-25 PROCEDURE — A9270 NON-COVERED ITEM OR SERVICE: HCPCS | Performed by: INTERNAL MEDICINE

## 2024-01-25 PROCEDURE — 700102 HCHG RX REV CODE 250 W/ 637 OVERRIDE(OP)

## 2024-01-25 PROCEDURE — 700105 HCHG RX REV CODE 258: Mod: UD | Performed by: EMERGENCY MEDICINE

## 2024-01-25 PROCEDURE — 700101 HCHG RX REV CODE 250

## 2024-01-25 PROCEDURE — 82330 ASSAY OF CALCIUM: CPT

## 2024-01-25 PROCEDURE — 700111 HCHG RX REV CODE 636 W/ 250 OVERRIDE (IP): Mod: JZ,JG | Performed by: INTERNAL MEDICINE

## 2024-01-25 PROCEDURE — 83605 ASSAY OF LACTIC ACID: CPT

## 2024-01-25 PROCEDURE — 99223 1ST HOSP IP/OBS HIGH 75: CPT | Mod: 25,AI | Performed by: INTERNAL MEDICINE

## 2024-01-25 PROCEDURE — 83735 ASSAY OF MAGNESIUM: CPT

## 2024-01-25 PROCEDURE — 96372 THER/PROPH/DIAG INJ SC/IM: CPT

## 2024-01-25 PROCEDURE — 96366 THER/PROPH/DIAG IV INF ADDON: CPT

## 2024-01-25 PROCEDURE — 74018 RADEX ABDOMEN 1 VIEW: CPT

## 2024-01-25 PROCEDURE — 93005 ELECTROCARDIOGRAM TRACING: CPT | Performed by: EMERGENCY MEDICINE

## 2024-01-25 PROCEDURE — 51798 US URINE CAPACITY MEASURE: CPT

## 2024-01-25 PROCEDURE — 99497 ADVNCD CARE PLAN 30 MIN: CPT | Performed by: INTERNAL MEDICINE

## 2024-01-25 PROCEDURE — 700102 HCHG RX REV CODE 250 W/ 637 OVERRIDE(OP): Performed by: INTERNAL MEDICINE

## 2024-01-25 PROCEDURE — 85025 COMPLETE CBC W/AUTO DIFF WBC: CPT

## 2024-01-25 PROCEDURE — 700105 HCHG RX REV CODE 258: Performed by: INTERNAL MEDICINE

## 2024-01-25 PROCEDURE — 99285 EMERGENCY DEPT VISIT HI MDM: CPT

## 2024-01-25 PROCEDURE — 83690 ASSAY OF LIPASE: CPT

## 2024-01-25 PROCEDURE — 96368 THER/DIAG CONCURRENT INF: CPT

## 2024-01-25 PROCEDURE — 85610 PROTHROMBIN TIME: CPT

## 2024-01-25 PROCEDURE — 84484 ASSAY OF TROPONIN QUANT: CPT

## 2024-01-25 PROCEDURE — 36415 COLL VENOUS BLD VENIPUNCTURE: CPT

## 2024-01-25 PROCEDURE — 96365 THER/PROPH/DIAG IV INF INIT: CPT

## 2024-01-25 PROCEDURE — 74177 CT ABD & PELVIS W/CONTRAST: CPT

## 2024-01-25 PROCEDURE — A9270 NON-COVERED ITEM OR SERVICE: HCPCS

## 2024-01-25 PROCEDURE — 81001 URINALYSIS AUTO W/SCOPE: CPT

## 2024-01-25 PROCEDURE — 770020 HCHG ROOM/CARE - TELE (206)

## 2024-01-25 PROCEDURE — 80053 COMPREHEN METABOLIC PANEL: CPT

## 2024-01-25 PROCEDURE — 71045 X-RAY EXAM CHEST 1 VIEW: CPT

## 2024-01-25 PROCEDURE — 700111 HCHG RX REV CODE 636 W/ 250 OVERRIDE (IP): Mod: UD | Performed by: EMERGENCY MEDICINE

## 2024-01-25 PROCEDURE — 84145 PROCALCITONIN (PCT): CPT

## 2024-01-25 PROCEDURE — 83036 HEMOGLOBIN GLYCOSYLATED A1C: CPT

## 2024-01-25 PROCEDURE — 83970 ASSAY OF PARATHORMONE: CPT

## 2024-01-25 PROCEDURE — 87040 BLOOD CULTURE FOR BACTERIA: CPT

## 2024-01-25 PROCEDURE — 87086 URINE CULTURE/COLONY COUNT: CPT

## 2024-01-25 PROCEDURE — 700117 HCHG RX CONTRAST REV CODE 255: Mod: UD | Performed by: EMERGENCY MEDICINE

## 2024-01-25 PROCEDURE — 96367 TX/PROPH/DG ADDL SEQ IV INF: CPT

## 2024-01-25 RX ORDER — ATORVASTATIN CALCIUM 20 MG/1
20 TABLET, FILM COATED ORAL NIGHTLY
COMMUNITY

## 2024-01-25 RX ORDER — AMLODIPINE BESYLATE 5 MG/1
2.5 TABLET ORAL DAILY
COMMUNITY

## 2024-01-25 RX ORDER — TAMSULOSIN HYDROCHLORIDE 0.4 MG/1
0.4 CAPSULE ORAL
Status: DISCONTINUED | OUTPATIENT
Start: 2024-01-25 | End: 2024-01-28 | Stop reason: HOSPADM

## 2024-01-25 RX ORDER — SODIUM CHLORIDE 9 MG/ML
1000 INJECTION, SOLUTION INTRAVENOUS ONCE
Status: COMPLETED | OUTPATIENT
Start: 2024-01-25 | End: 2024-01-25

## 2024-01-25 RX ORDER — MAGNESIUM SULFATE HEPTAHYDRATE 40 MG/ML
2 INJECTION, SOLUTION INTRAVENOUS ONCE
Status: COMPLETED | OUTPATIENT
Start: 2024-01-25 | End: 2024-01-25

## 2024-01-25 RX ORDER — POTASSIUM CHLORIDE 20 MEQ/1
40 TABLET, EXTENDED RELEASE ORAL 2 TIMES DAILY
Status: COMPLETED | OUTPATIENT
Start: 2024-01-25 | End: 2024-01-25

## 2024-01-25 RX ORDER — GABAPENTIN 100 MG/1
100 CAPSULE ORAL 3 TIMES DAILY PRN
Status: DISCONTINUED | OUTPATIENT
Start: 2024-01-25 | End: 2024-01-28 | Stop reason: HOSPADM

## 2024-01-25 RX ORDER — OXYCODONE HYDROCHLORIDE 5 MG/1
2.5 TABLET ORAL
Status: DISCONTINUED | OUTPATIENT
Start: 2024-01-25 | End: 2024-01-28 | Stop reason: HOSPADM

## 2024-01-25 RX ORDER — LIDOCAINE 4 G/G
2 PATCH TOPICAL EVERY 24 HOURS
Status: DISCONTINUED | OUTPATIENT
Start: 2024-01-25 | End: 2024-01-28 | Stop reason: HOSPADM

## 2024-01-25 RX ORDER — AMLODIPINE BESYLATE 5 MG/1
5 TABLET ORAL EVERY EVENING
Status: DISCONTINUED | OUTPATIENT
Start: 2024-01-25 | End: 2024-01-28 | Stop reason: HOSPADM

## 2024-01-25 RX ORDER — LISINOPRIL 40 MG/1
40 TABLET ORAL DAILY
COMMUNITY

## 2024-01-25 RX ORDER — SODIUM CHLORIDE 9 MG/ML
INJECTION, SOLUTION INTRAVENOUS CONTINUOUS
Status: ACTIVE | OUTPATIENT
Start: 2024-01-25 | End: 2024-01-27

## 2024-01-25 RX ORDER — LINACLOTIDE 145 UG/1
1 CAPSULE, GELATIN COATED ORAL EVERY MORNING
COMMUNITY

## 2024-01-25 RX ORDER — UREA 10 %
500 LOTION (ML) TOPICAL
Status: DISCONTINUED | OUTPATIENT
Start: 2024-01-25 | End: 2024-01-28 | Stop reason: HOSPADM

## 2024-01-25 RX ORDER — POTASSIUM CHLORIDE 7.45 MG/ML
10 INJECTION INTRAVENOUS ONCE
Status: COMPLETED | OUTPATIENT
Start: 2024-01-25 | End: 2024-01-25

## 2024-01-25 RX ORDER — LACTULOSE 20 G/30ML
15 SOLUTION ORAL 2 TIMES DAILY
Status: DISCONTINUED | OUTPATIENT
Start: 2024-01-25 | End: 2024-01-28 | Stop reason: HOSPADM

## 2024-01-25 RX ORDER — GABAPENTIN 100 MG/1
300 CAPSULE ORAL
COMMUNITY

## 2024-01-25 RX ORDER — OXYCODONE HYDROCHLORIDE 5 MG/1
5 TABLET ORAL
Status: DISCONTINUED | OUTPATIENT
Start: 2024-01-25 | End: 2024-01-28 | Stop reason: HOSPADM

## 2024-01-25 RX ORDER — LABETALOL HYDROCHLORIDE 5 MG/ML
10 INJECTION, SOLUTION INTRAVENOUS EVERY 4 HOURS PRN
Status: DISCONTINUED | OUTPATIENT
Start: 2024-01-25 | End: 2024-01-28 | Stop reason: HOSPADM

## 2024-01-25 RX ORDER — ACETAMINOPHEN 325 MG/1
650 TABLET ORAL EVERY 6 HOURS PRN
Status: DISCONTINUED | OUTPATIENT
Start: 2024-01-25 | End: 2024-01-28 | Stop reason: HOSPADM

## 2024-01-25 RX ORDER — POLYETHYLENE GLYCOL 3350 17 G/17G
1 POWDER, FOR SOLUTION ORAL
Status: DISCONTINUED | OUTPATIENT
Start: 2024-01-25 | End: 2024-01-28 | Stop reason: HOSPADM

## 2024-01-25 RX ORDER — AMOXICILLIN 250 MG
2 CAPSULE ORAL 2 TIMES DAILY
Status: DISCONTINUED | OUTPATIENT
Start: 2024-01-25 | End: 2024-01-28 | Stop reason: HOSPADM

## 2024-01-25 RX ORDER — CALCIUM GLUCONATE 20 MG/ML
2 INJECTION, SOLUTION INTRAVENOUS ONCE
Status: COMPLETED | OUTPATIENT
Start: 2024-01-25 | End: 2024-01-25

## 2024-01-25 RX ORDER — ONDANSETRON 4 MG/1
4 TABLET, ORALLY DISINTEGRATING ORAL EVERY 4 HOURS PRN
Status: DISCONTINUED | OUTPATIENT
Start: 2024-01-25 | End: 2024-01-28 | Stop reason: HOSPADM

## 2024-01-25 RX ORDER — ONDANSETRON 2 MG/ML
4 INJECTION INTRAMUSCULAR; INTRAVENOUS EVERY 4 HOURS PRN
Status: DISCONTINUED | OUTPATIENT
Start: 2024-01-25 | End: 2024-01-28 | Stop reason: HOSPADM

## 2024-01-25 RX ORDER — HEPARIN SODIUM 5000 [USP'U]/ML
5000 INJECTION, SOLUTION INTRAVENOUS; SUBCUTANEOUS EVERY 8 HOURS
Status: DISCONTINUED | OUTPATIENT
Start: 2024-01-25 | End: 2024-01-28 | Stop reason: HOSPADM

## 2024-01-25 RX ORDER — HYDROMORPHONE HYDROCHLORIDE 1 MG/ML
0.25 INJECTION, SOLUTION INTRAMUSCULAR; INTRAVENOUS; SUBCUTANEOUS
Status: DISCONTINUED | OUTPATIENT
Start: 2024-01-25 | End: 2024-01-28 | Stop reason: HOSPADM

## 2024-01-25 RX ORDER — BISACODYL 10 MG
10 SUPPOSITORY, RECTAL RECTAL
Status: DISCONTINUED | OUTPATIENT
Start: 2024-01-25 | End: 2024-01-28 | Stop reason: HOSPADM

## 2024-01-25 RX ORDER — LISINOPRIL 20 MG/1
40 TABLET ORAL EVERY EVENING
Status: DISCONTINUED | OUTPATIENT
Start: 2024-01-25 | End: 2024-01-28 | Stop reason: HOSPADM

## 2024-01-25 RX ORDER — ATORVASTATIN CALCIUM 20 MG/1
20 TABLET, FILM COATED ORAL NIGHTLY
Status: DISCONTINUED | OUTPATIENT
Start: 2024-01-25 | End: 2024-01-28 | Stop reason: HOSPADM

## 2024-01-25 RX ADMIN — SODIUM CHLORIDE 1000 ML: 9 INJECTION, SOLUTION INTRAVENOUS at 04:37

## 2024-01-25 RX ADMIN — ATORVASTATIN CALCIUM 20 MG: 20 TABLET, FILM COATED ORAL at 20:06

## 2024-01-25 RX ADMIN — HEPARIN SODIUM 5000 UNITS: 5000 INJECTION, SOLUTION INTRAVENOUS; SUBCUTANEOUS at 14:36

## 2024-01-25 RX ADMIN — DOCUSATE SODIUM 50 MG AND SENNOSIDES 8.6 MG 2 TABLET: 8.6; 5 TABLET, FILM COATED ORAL at 18:02

## 2024-01-25 RX ADMIN — LIDOCAINE 2 PATCH: 4 PATCH TOPICAL at 20:06

## 2024-01-25 RX ADMIN — SODIUM CHLORIDE: 9 INJECTION, SOLUTION INTRAVENOUS at 08:44

## 2024-01-25 RX ADMIN — TAMSULOSIN HYDROCHLORIDE 0.4 MG: 0.4 CAPSULE ORAL at 11:48

## 2024-01-25 RX ADMIN — POTASSIUM CHLORIDE 40 MEQ: 20 TABLET, EXTENDED RELEASE ORAL at 11:35

## 2024-01-25 RX ADMIN — MAGNESIUM SULFATE HEPTAHYDRATE 2 G: 2 INJECTION, SOLUTION INTRAVENOUS at 10:06

## 2024-01-25 RX ADMIN — AMLODIPINE BESYLATE 5 MG: 5 TABLET ORAL at 17:52

## 2024-01-25 RX ADMIN — HEPARIN SODIUM 5000 UNITS: 5000 INJECTION, SOLUTION INTRAVENOUS; SUBCUTANEOUS at 20:07

## 2024-01-25 RX ADMIN — POTASSIUM CHLORIDE 10 MEQ: 7.46 INJECTION, SOLUTION INTRAVENOUS at 07:37

## 2024-01-25 RX ADMIN — CALCIUM GLUCONATE 2 G: 20 INJECTION, SOLUTION INTRAVENOUS at 10:02

## 2024-01-25 RX ADMIN — Medication 500 MG: at 17:53

## 2024-01-25 RX ADMIN — ACETAMINOPHEN 650 MG: 325 TABLET, FILM COATED ORAL at 17:53

## 2024-01-25 RX ADMIN — DOCUSATE SODIUM 50 MG AND SENNOSIDES 8.6 MG 2 TABLET: 8.6; 5 TABLET, FILM COATED ORAL at 11:35

## 2024-01-25 RX ADMIN — HEPARIN SODIUM 5000 UNITS: 5000 INJECTION, SOLUTION INTRAVENOUS; SUBCUTANEOUS at 11:35

## 2024-01-25 RX ADMIN — OXYCODONE 5 MG: 5 TABLET ORAL at 20:06

## 2024-01-25 RX ADMIN — LACTULOSE 15 ML: 20 SOLUTION ORAL at 11:40

## 2024-01-25 RX ADMIN — LACTULOSE 15 ML: 20 SOLUTION ORAL at 17:53

## 2024-01-25 RX ADMIN — LISINOPRIL 40 MG: 20 TABLET ORAL at 17:52

## 2024-01-25 RX ADMIN — POTASSIUM CHLORIDE 40 MEQ: 20 TABLET, EXTENDED RELEASE ORAL at 17:53

## 2024-01-25 RX ADMIN — IOHEXOL 100 ML: 350 INJECTION, SOLUTION INTRAVENOUS at 06:06

## 2024-01-25 ASSESSMENT — ENCOUNTER SYMPTOMS
EYE PAIN: 0
PALPITATIONS: 0
NERVOUS/ANXIOUS: 0
ABDOMINAL PAIN: 0
INSOMNIA: 0
COUGH: 0
CHILLS: 0
NAUSEA: 0
HEADACHES: 1
CONSTIPATION: 1
BACK PAIN: 0
SHORTNESS OF BREATH: 0
BLURRED VISION: 0
FEVER: 0
WEAKNESS: 1
VOMITING: 0
DIZZINESS: 0

## 2024-01-25 ASSESSMENT — COGNITIVE AND FUNCTIONAL STATUS - GENERAL
DAILY ACTIVITIY SCORE: 21
WALKING IN HOSPITAL ROOM: A LITTLE
DRESSING REGULAR LOWER BODY CLOTHING: A LOT
MOVING FROM LYING ON BACK TO SITTING ON SIDE OF FLAT BED: A LITTLE
MOBILITY SCORE: 18
STANDING UP FROM CHAIR USING ARMS: A LITTLE
SUGGESTED CMS G CODE MODIFIER MOBILITY: CK
HELP NEEDED FOR BATHING: A LITTLE
CLIMB 3 TO 5 STEPS WITH RAILING: A LOT
MOVING TO AND FROM BED TO CHAIR: A LITTLE
SUGGESTED CMS G CODE MODIFIER DAILY ACTIVITY: CJ

## 2024-01-25 ASSESSMENT — PATIENT HEALTH QUESTIONNAIRE - PHQ9
SUM OF ALL RESPONSES TO PHQ9 QUESTIONS 1 AND 2: 0
2. FEELING DOWN, DEPRESSED, IRRITABLE, OR HOPELESS: NOT AT ALL
1. LITTLE INTEREST OR PLEASURE IN DOING THINGS: NOT AT ALL

## 2024-01-25 ASSESSMENT — PAIN DESCRIPTION - PAIN TYPE
TYPE: ACUTE PAIN
TYPE: ACUTE PAIN

## 2024-01-25 ASSESSMENT — COPD QUESTIONNAIRES
DO YOU EVER COUGH UP ANY MUCUS OR PHLEGM?: NO/ONLY WITH OCCASIONAL COLDS OR INFECTIONS
COPD SCREENING SCORE: 5
HAVE YOU SMOKED AT LEAST 100 CIGARETTES IN YOUR ENTIRE LIFE: YES
DURING THE PAST 4 WEEKS HOW MUCH DID YOU FEEL SHORT OF BREATH: SOME OF THE TIME

## 2024-01-25 ASSESSMENT — FIBROSIS 4 INDEX: FIB4 SCORE: 1

## 2024-01-25 ASSESSMENT — LIFESTYLE VARIABLES: ALCOHOL_USE: NO

## 2024-01-25 NOTE — ED TRIAGE NOTES
Chief Complaint   Patient presents with    Abdominal Pain     Pt BIB EMS for generalized abdominal pain x1 year, with hx of laxative abuse x2 years. Pt reports last BM 4 days ago, denies any blood in stool. C/o SOB, and new onset tremors.        Pt received 100mcg Fentanyl and 4mg zofran PTA.

## 2024-01-25 NOTE — ASSESSMENT & PLAN NOTE
Pt reported 1-2 pack per day use, smoked tobacco for over 50 years, quit 10 years ago.  Wheezing to RUL on exam  CXR showing possible LLL pneumonia  - RT consult

## 2024-01-25 NOTE — ASSESSMENT & PLAN NOTE
Flomax initiated  Await clearance of hematuria that initiated after Mccabe placement before removal  1/26 overnight had blood clot develop, causing urinary retention with existing Mccabe catheter and despite flushing there is no drainage.  A new catheter with a coudé was placed and he had urinary drainage.  He had clearance of his hematuria.  IV fluids

## 2024-01-25 NOTE — ED NOTES
Report from Jennifer ALCAZAR. Pt GCS 15. NS infusion per MAR running. On cardiac monitor. Lab bedside.

## 2024-01-25 NOTE — ED NOTES
Medication history reviewed with patient at bedside.   Med rec is complete  Allergies reviewed.   Patient has not had any outpatient antibiotics in the last 30 days.   Anticoagulants: No    Nitin Phillips

## 2024-01-25 NOTE — H&P
Hospital Medicine History & Physical Note    Date of Service  1/25/2024    Primary Care Physician  Ida Severino P.A.-C.    Consultants  none    Code Status  Full Code    Chief Complaint  Chief Complaint   Patient presents with    Abdominal Pain     Pt BIB EMS for generalized abdominal pain x1 year, with hx of laxative abuse x2 years. Pt reports last BM 4 days ago, denies any blood in stool. C/o SOB, and new onset tremors.          History of Presenting Illness  Fausto Martin is a 73M with a past medical history of lumbar radiculopathy, HTN, HLD, chronic constipation on Linzess being admitted on 101/25/2024 for abdominal pain, acute on chronic, 1 year, last BM 4 days ago.  Also has shortness of breath, tremors. He reports increased generalized weakness and headache.  He also reports suprapubic pain, no dysuria, feels he was urinating normally at home but has to stand up.    I spoke with EDP about patient's case, he has unfortunately ongoing constipation issues and was found to have low electrolytes.  Patient was given 1 L NS, started on potassium replacement IV.    On my review:  Tachycardia , tachypneic RR 22, leukocytosis 15.4 neutrophil predominance  Hypokalemia 2.8, bicarb 17, hyperchloremia 113, hypocalcemia 6.7    CT abdomen pelvis showing possible cirrhosis, 3.2 cm infrarenal abdominal aortic aneurysm, inguinal hernia, diverticulosis, atherosclerosis.  Abdominal x-ray shows constipation.  Chest x-ray possible left lower lobe pneumonia.  ECG HR 87, QTc 448 ms, SR    Acute hypoxemic respiratory failure 87% on room air, 2 L  Acute urinary retention, bladder scan in  mL    I discussed the plan of care with patient, bedside RN, charge RN, , and pharmacy.    Review of Systems  Review of Systems   Constitutional:  Positive for malaise/fatigue. Negative for chills and fever.   HENT:  Negative for congestion and hearing loss.    Eyes:  Negative for blurred vision and pain.    Respiratory:  Negative for cough and shortness of breath.    Cardiovascular:  Negative for chest pain and palpitations.   Gastrointestinal:  Positive for constipation. Negative for abdominal pain, nausea and vomiting.   Genitourinary:  Negative for dysuria and hematuria.        Suprapubic pain   Musculoskeletal:  Negative for back pain and joint pain.   Skin:  Negative for itching and rash.   Neurological:  Positive for weakness and headaches. Negative for dizziness.   Psychiatric/Behavioral:  The patient is not nervous/anxious and does not have insomnia.    All other systems reviewed and are negative.      Past Medical History   has a past medical history of High cholesterol, Hypertension, and Neuropathy.    Surgical History   has no past surgical history on file.     Family History  family history is not on file.   Family history reviewed with patient. There is no family history that is pertinent to the chief complaint.     Social History   reports that he has never smoked. He has never used smokeless tobacco. He reports that he does not currently use alcohol. He reports that he does not use drugs.    Allergies  No Known Allergies    Medications  Prior to Admission Medications   Prescriptions Last Dose Informant Patient Reported? Taking?   Acetaminophen (ACETAMIN PO) 1/23/2024 at [facility] Patient Yes Yes   Sig: Take 3 Tablets by mouth 1 time a day as needed (mild pain).   amLODIPine (NORVASC) 5 MG Tab 1/24/2024 at 1800 Patient Yes Yes   Sig: Take 5 mg by mouth every day.   atorvastatin (LIPITOR) 20 MG Tab 1/24/2024 at 2200 Patient Yes Yes   Sig: Take 20 mg by mouth every evening.   gabapentin (NEURONTIN) 100 MG Cap 1/24/2024 at 1800 Patient Yes Yes   Sig: Take 100 mg by mouth 3 times a day as needed (pain).   linaCLOtide (LINZESS) 145 MCG Cap ~5 days at [facility] Patient Yes Yes   Sig: Take 1 Capsule by mouth every morning.   lisinopril (PRINIVIL) 40 MG tablet 1/24/2024 at 1800 Patient Yes Yes   Sig: Take 40 mg by mouth  every day.      Facility-Administered Medications: None       Physical Exam  Temp:  [35.9 °C (96.7 °F)-37.1 °C (98.8 °F)] 37.1 °C (98.8 °F)  Pulse:  [] 90  Resp:  [18-22] 20  BP: (145-165)/(86-97) 149/91  SpO2:  [87 %-96 %] 96 %  Blood Pressure : (!) 149/91   Temperature: 37.1 °C (98.8 °F)   Pulse: 90   Respiration: 20   Pulse Oximetry: 96 %       Physical Exam  Vitals and nursing note reviewed.   Constitutional:       General: He is in acute distress.      Appearance: He is ill-appearing.   HENT:      Head: Normocephalic and atraumatic.      Right Ear: External ear normal.      Left Ear: External ear normal.      Nose: Nose normal. No congestion.      Mouth/Throat:      Mouth: Mucous membranes are dry.      Pharynx: No oropharyngeal exudate.   Eyes:      General: No scleral icterus.     Extraocular Movements: Extraocular movements intact.   Cardiovascular:      Rate and Rhythm: Normal rate and regular rhythm.      Pulses: Normal pulses.      Heart sounds: Normal heart sounds. No murmur heard.  Pulmonary:      Effort: Pulmonary effort is normal. No respiratory distress.      Breath sounds: Wheezing (RUL) present.      Comments: On 2LNC  Abdominal:      General: Abdomen is flat. Bowel sounds are normal. There is no distension.      Palpations: Abdomen is soft.   Musculoskeletal:         General: No swelling. Normal range of motion.      Cervical back: Normal range of motion and neck supple.      Right lower leg: No edema.      Left lower leg: No edema.   Skin:     General: Skin is warm.      Capillary Refill: Capillary refill takes less than 2 seconds.      Coloration: Skin is not jaundiced.      Findings: No erythema.   Neurological:      General: No focal deficit present.      Mental Status: He is alert and oriented to person, place, and time. Mental status is at baseline.      Motor: Weakness present.   Psychiatric:         Mood and Affect: Mood normal.         Behavior: Behavior normal.         Thought  "Content: Thought content normal.         Judgment: Judgment normal.         Laboratory:  Recent Labs     01/25/24  0358   WBC 15.4*   RBC 4.85   HEMOGLOBIN 15.5   HEMATOCRIT 43.9   MCV 90.5   MCH 32.0   MCHC 35.3   RDW 40.5   PLATELETCT 219   MPV 11.0     Recent Labs     01/25/24 0454   SODIUM 138   POTASSIUM 2.8*   CHLORIDE 113*   CO2 17*   GLUCOSE 116*   BUN 14   CREATININE 0.67   CALCIUM 5.6*     Recent Labs     01/25/24 0358 01/25/24 0454   ALTSGPT  --  16   ASTSGOT  --  12   ALKPHOSPHAT  --  49   TBILIRUBIN  --  1.2   LIPASE 25  --    GLUCOSE  --  116*     Recent Labs     01/25/24 0358   INR 1.12     No results for input(s): \"NTPROBNP\" in the last 72 hours.      Recent Labs     01/25/24 0454   TROPONINT 8       Imaging:  CT-ABDOMEN-PELVIS WITH   Final Result         1.  Irregular hepatic contour favoring changes of cirrhosis   2.  3.2 cm fusiform infrarenal abdominal aortic aneurysm, radiographic follow-up and surveillance recommended as clinically appropriate   3.  Fat-containing right inguinal hernia   4.  Diverticulosis   5.  Atherosclerosis      [id number] VIEW   Final Result         1.  Moderate stool in the colon suggests changes of constipation, otherwise nonspecific bowel gas pattern in the upper abdomen      DX-CHEST-PORTABLE (1 VIEW)   Final Result         1.  Hazy left lung base opacity suggesting subtle infiltrate   2.  Atherosclerosis          My review listed above in HPI    Assessment/Plan:  Justification for Admission Status  I anticipate this patient will require at least two midnights for appropriate medical management, necessitating inpatient admission because patient has significant severe constipation leading to severe hypocalcemia with corrected calcium of 6.7, hypokalemia 2.8, hypomagnesemia.  We are needing to recheck labs, check magnesium level.  Patient is also positive for SIRS without clear source of infection, he may have underlying source that needs to be " investigated.    Patient will need a Telemetry bed on MEDICINE service .  The need is secondary to hypocalcemia, hypokalemia, hypomag, SIRS.    * Idiopathic hypocalcemia- (present on admission)  Assessment & Plan  Corrected calcium 6.7  With severely low mag   ECG is not showing any significant abnormalities  Starting IV calcium gluconate replacement  Recheck labs in the morning  Checking PTH, ionized calcium  Admit to telemetry    Hypomagnesemia- (present on admission)  Assessment & Plan  Low serum magnesium levels, 1.1  I am replacing via IV, monitoring closely for hypotension side effect  I am replacing via PO replacements - Mag gluconate po daily  I am repeating labs in AM    Acute hypoxemic respiratory failure (HCC)- (present on admission)  Assessment & Plan  Hypoxic 87% on room air in ED, required 2LNC.  c/o SOB. Pt presented tachypneic RR 22.  No home oxygen, no prior PFT outpatient, NO prior diagnosis of asthma or COPD.  Extended tobacco use history, currently smokes marijuana.  CT A/P, on my review I do appreciate minor emphysema changes particularly LLL.  - continue oxygen support, wean as possible.  - RT consult. Wheezing to RUL.    SIRS (systemic inflammatory response syndrome) (HCC)- (present on admission)  Assessment & Plan  SIRS criteria identified on my evaluation include:  Tachycardia, with heart rate greater than 90 BPM, Tachypnea, with respirations greater than 20 per minute, and Leukocytosis, with WBC greater than 12,000  SIRS is non-infectious, the patient does not have sepsis  CXR questionable for LLL infiltrate, but no exam findings. Pt wheezing to RUL.  UA is low suspicion for cystitis    -This may be due to dehydration  - Recheck CBC in the morning  - Holding antibiotics, patient does not appear septic  - checking procalcitonin, blood cx, lactic acid levels.  - repeat CBC in AM    Hypokalemia- (present on admission)  Assessment & Plan  Potassium 2.8 admission  Replacing via IV  Recheck labs  in the morning  Monitor on telemetry    Infrarenal abdominal aortic aneurysm (AAA) without rupture (HCC)- (present on admission)  Assessment & Plan  Incidental CT finding  3.2 cm  Hx of tobacco use, HLD and HTN  Will need ongoing monitoring outpatient  Strict BP control    Urinary retention- (present on admission)  Assessment & Plan  Due to constipation  Possible BPH  -  bladder scan protocol ordered, patient agreed with Mccabe catheter if needed.  - trial tamsulosin, no allergy to sulfa drugs    Chronic idiopathic constipation- (present on admission)  Assessment & Plan  Acute on chronic  Seen on x-ray  Starting lactulose and BM regimen    Advanced care planning/counseling discussion- (present on admission)  Assessment & Plan  I had a prolonged discussion with patient about his current medical conditions, prior history, extensive tobacco use history, prior illicit drug use history.  I explained to the patient he has severe electrolyte abnormalities that require IV replacements, telemetry monitoring.  He states he has never really follow-up outpatient for most of his conditions other than hypertension and hyperlipidemia.  He has never had a pulmonary function test despite having a prolonged tobacco use and current marijuana smoking use.  We extensively discussed CPR and intubation/mechanical ventilation as a method for emergency resuscitation in the hospital.  Patient decided that he will be FULL CODE.  Total advance care planning time spent 17 minutes.    Mild tetrahydrocannabinol (THC) abuse- (present on admission)  Assessment & Plan  Patient reports ongoing smoking marijuana  Counseled on admission for cessation    History of tobacco abuse- (present on admission)  Assessment & Plan  Pt reported 1-2 pack per day use, smoked tobacco for over 50 years, quit 10 years ago.  Wheezing to RUL on exam  CXR showing possible LLL pneumonia  - RT consult      History of drug abuse (HCC)- (present on admission)  Assessment &  Plan  Patient mentioned he quit 30 years ago, but used to use cocaine, heroin and any other illicit drugs    Right Inguinal hernia without obstruction or gangrene- (present on admission)  Assessment & Plan  Fat containing, no intestinal involvement on CT scan  monitor    Diverticulosis- (present on admission)  Assessment & Plan  Due to chronic constipation        VTE prophylaxis: heparin ppx      Total time spent on admission 76 minutes.    This included my review with ER physician, review of ED events, patient's history, outside records, recent records, face to face interview, physical examination; my review of lab results (CBC, chemistry panel), imaging review (CXR) and ECG. Also includes counseling patient on admission, treatment plan, and documentation of encounter.

## 2024-01-25 NOTE — ED NOTES
Pt stating that he feels the urge to urinate, but cannot do so while lying down. Bladder scan done to reveal 280 mL in bladder. RN assisted pt to stand bedside to use urinal. Pt unsteady while standing, became short of breath and fatigued with standing. Pt able to use urinal and quickly sat back down on the bed. Output of 50 mL clear yellow urine.

## 2024-01-25 NOTE — ED PROVIDER NOTES
ED Provider Note    CHIEF COMPLAINT  Chief Complaint   Patient presents with    Abdominal Pain     Pt BIB EMS for generalized abdominal pain x1 year, with hx of laxative abuse x2 years. Pt reports last BM 4 days ago, denies any blood in stool. C/o SOB, and new onset tremors.          EXTERNAL RECORDS REVIEWED  ED encounter 2021 c/o constipation     CT abd pelvis from 2021   IMPRESSION:     1.  Small hepatic cyst.     2.  Small right renal cysts.     3.  Moderate atherosclerotic changes of the infrarenal abdominal aorta and iliac arteries. Mild ectasia of infrarenal aorta with greatest diameter of 2.9 cm.     4.  Scattered sigmoid diverticula.    HPI/ROS  LIMITATION TO HISTORY   Select: : None  OUTSIDE HISTORIAN(S):  EMS history taken from EMS upon patient arrival.  An IV established.  Patient was treated with fentanyl and Zofran.  He did become hypoxic after fentanyl and required oxygen which she is not typically on.  Vital signs 143/92, pulse 115, respiratory rate 20, 96% on room air prior to administration of opioids.  GCS 15 blood sugar 1 patient denies chest pain.  He does report some mild shortness of breath which he states is not new.  37.      Fausto Martin is a 73 y.o. male who presents to the emergency department via EMS.  Patient lives alone.  Patient called EMS services for abdominal pain which she describes as periumbilical.  Has had symptoms for perhaps 2 years.  He has a history of chronic constipation for which he uses Dulcolax and Linzess.  He states that typically, he does not have a bowel movement on his own and every 3 or 4 days, he uses laxatives to assist with this.  He was having runny bowel movements so he stopped taking his laxatives.  He has had no bowel movement for the last 3 to 4 days.  No fever reported.  Patient reports increased urinary frequency just in the last 12 to 24 hours.  No vomiting.  He denies workup for his chronic abdominal pain but also notes that about 7 months ago  "he had both an endoscopy and a colonoscopy which he was told was unremarkable other than benign polyps.  He receives his primary care at the [facility].  He states his last physician was Dr. Abrams but it often changes and it has since changed.  He does not recollect the name of his new physician.  He reports a history of \"stage IV kidney disease\", but does not see a nephrologist.  Denies any past surgical history other than his tonsils being removed.  Denies prior MI or stroke.    PAST MEDICAL HISTORY   has a past medical history of High cholesterol, Hypertension, and Neuropathy.  Scoliosis, hyperlipidemia, chronic constipation    SURGICAL HISTORY  Remote history of tonsil and adenoid removal    FAMILY HISTORY  History reviewed. No pertinent family history.    SOCIAL HISTORY  Social History     Tobacco Use    Smoking status: Never    Smokeless tobacco: Never   Vaping Use    Vaping Use: Never used   Substance and Sexual Activity    Alcohol use: Not Currently    Drug use: Never    Sexual activity: Not on file       CURRENT MEDICATIONS  Home Medications       Reviewed by Nitin Phillips (Pharmacy Tech) on 01/25/24 at 0758  Med List Status: Complete     Medication Last Dose Status   Acetaminophen (ACETAMIN PO) 1/23/2024 Active   amLODIPine (NORVASC) 5 MG Tab 1/24/2024 Active   atorvastatin (LIPITOR) 20 MG Tab 1/24/2024 Active   gabapentin (NEURONTIN) 100 MG Cap 1/24/2024 Active   linaCLOtide (LINZESS) 145 MCG Cap ~5 days Active   lisinopril (PRINIVIL) 40 MG tablet 1/24/2024 Active                  Gabapentin, Linzess, amlodipine, atorvastatin, lisinopril    ALLERGIES  No Known Allergies    PHYSICAL EXAM  VITAL SIGNS: BP (!) 142/83   Pulse 84   Temp 37.1 °C (98.8 °F) (Temporal)   Resp (!) 22   Ht 1.829 m (6')   Wt 95.3 kg (210 lb)   SpO2 97%   BMI 28.48 kg/m²    Vitals reviewed.  Constitutional: Patient is oriented to person, place, and time. Appears well-developed and well-nourished. Mild " distress.    Head: Normocephalic and atraumatic.   Ears: Normal external ears bilaterally.   Mouth/Throat: Oropharynx is clear . Dry MM.  Eyes: Conjunctivae are normal. Pupils are equal, round.  Neck: Normal range of motion.   Cardiovascular: Normal rate, regular rhythm and normal heart sounds. Normal peripheral pulses, bilateral UE..  Pulmonary/Chest: Effort normal and breath sounds normal. No respiratory distress, no wheezes, rhonchi, or rales.  Abdominal: Soft. Bowel sounds are normal. There is diffuse tenderness. No rebound or guarding, or peritoneal signs. No CVA tenderness.  Musculoskeletal: No edema and no tenderness.   Neurological: No cranial nerve deficits on passive exam. No focal deficits.   Skin: Skin is warm and dry. No erythema. No pallor.   Psychiatric: Patient has a normal mood and affect.     DIAGNOSTIC STUDIES / PROCEDURES  EKG  I have independently interpreted this EKG    LABS  Results for orders placed or performed during the hospital encounter of 01/25/24   LIPASE   Result Value Ref Range    Lipase 25 11 - 82 U/L   CBC WITH DIFFERENTIAL   Result Value Ref Range    WBC 15.4 (H) 4.8 - 10.8 K/uL    RBC 4.85 4.70 - 6.10 M/uL    Hemoglobin 15.5 14.0 - 18.0 g/dL    Hematocrit 43.9 42.0 - 52.0 %    MCV 90.5 81.4 - 97.8 fL    MCH 32.0 27.0 - 33.0 pg    MCHC 35.3 32.3 - 36.5 g/dL    RDW 40.5 35.9 - 50.0 fL    Platelet Count 219 164 - 446 K/uL    MPV 11.0 9.0 - 12.9 fL    Neutrophils-Polys 82.00 (H) 44.00 - 72.00 %    Lymphocytes 9.90 (L) 22.00 - 41.00 %    Monocytes 6.80 0.00 - 13.40 %    Eosinophils 0.50 0.00 - 6.90 %    Basophils 0.40 0.00 - 1.80 %    Immature Granulocytes 0.40 0.00 - 0.90 %    Nucleated RBC 0.00 0.00 - 0.20 /100 WBC    Neutrophils (Absolute) 12.63 (H) 1.82 - 7.42 K/uL    Lymphs (Absolute) 1.52 1.00 - 4.80 K/uL    Monos (Absolute) 1.05 (H) 0.00 - 0.85 K/uL    Eos (Absolute) 0.07 0.00 - 0.51 K/uL    Baso (Absolute) 0.06 0.00 - 0.12 K/uL    Immature Granulocytes (abs) 0.06 0.00 - 0.11  K/uL    NRBC (Absolute) 0.00 K/uL   URINALYSIS    Specimen: Urine, Clean Catch   Result Value Ref Range    Color Yellow     Character Clear     Specific Gravity <=1.005 <1.035    Ph 5.5 5.0 - 8.0    Glucose Negative Negative mg/dL    Ketones 15 (A) Negative mg/dL    Protein Negative Negative mg/dL    Bilirubin Negative Negative    Urobilinogen, Urine 0.2 Negative    Nitrite Negative Negative    Leukocyte Esterase Negative Negative    Occult Blood Trace (A) Negative    Micro Urine Req Microscopic    Comp Metabolic Panel   Result Value Ref Range    Sodium 138 135 - 145 mmol/L    Potassium 2.8 (L) 3.6 - 5.5 mmol/L    Chloride 113 (H) 96 - 112 mmol/L    Co2 17 (L) 20 - 33 mmol/L    Anion Gap 8.0 7.0 - 16.0    Glucose 116 (H) 65 - 99 mg/dL    Bun 14 8 - 22 mg/dL    Creatinine 0.67 0.50 - 1.40 mg/dL    Calcium 5.6 (LL) 8.5 - 10.5 mg/dL    Correct Calcium 6.7 (LL) 8.5 - 10.5 mg/dL    AST(SGOT) 12 12 - 45 U/L    ALT(SGPT) 16 2 - 50 U/L    Alkaline Phosphatase 49 30 - 99 U/L    Total Bilirubin 1.2 0.1 - 1.5 mg/dL    Albumin 2.6 (L) 3.2 - 4.9 g/dL    Total Protein 4.3 (L) 6.0 - 8.2 g/dL    Globulin 1.7 (L) 1.9 - 3.5 g/dL    A-G Ratio 1.5 g/dL   TROPONIN   Result Value Ref Range    Troponin T 8 6 - 19 ng/L   ESTIMATED GFR   Result Value Ref Range    GFR (CKD-EPI) 98 >60 mL/min/1.73 m 2   URINE MICROSCOPIC (W/UA)   Result Value Ref Range    WBC 0-2 (A) /hpf    RBC 0-2 (A) /hpf    Bacteria Negative None /hpf    Epithelial Cells Few /hpf    Hyaline Cast 0-2 /lpf   MAGNESIUM   Result Value Ref Range    Magnesium 1.1 (L) 1.5 - 2.5 mg/dL   LACTIC ACID   Result Value Ref Range    Lactic Acid 1.1 0.5 - 2.0 mmol/L   Prothrombin time (INR)   Result Value Ref Range    PT 14.6 12.0 - 14.6 sec    INR 1.12 0.87 - 1.13   PROCALCITONIN   Result Value Ref Range    Procalcitonin 0.05 <0.25 ng/mL   HEMOGLOBIN A1C   Result Value Ref Range    Glycohemoglobin 5.5 4.0 - 5.6 %    Est Avg Glucose 111 mg/dL   PTH WITH IONIZED CALCIUM   Result Value Ref  Range    Pth, Intact 26.3 14.0 - 72.0 pg/mL   EKG   Result Value Ref Range    Report       [facility] Emergency Dept.    Test Date:  2024  Pt Name:    YVETTE CAMARA                 Department: ER  MRN:        [id number]                      Room:       RD 03  Gender:     Male                         Technician: [id number]  :        [id number]                   Requested By:ANISHA ALATORRE  Order #:    [id number]                    Reading MD:    Measurements  Intervals                                Axis  Rate:       87                           P:          14  NH:         179                          QRS:        46  QRSD:       97                           T:          54  QT:         372  QTc:        448    Interpretive Statements  Sinus rhythm  Abnormal R-wave progression, early transition  Baseline wander in lead(s) V2  No previous ECG available for comparison           RADIOLOGY  I have independently interpreted the diagnostic imaging associated with this visit and am waiting the final reading from the radiologist.   My preliminary interpretation is as follows: ABD, CXR: NAPD, increased stool without AF levels.   Radiologist interpretation:   CT-ABDOMEN-PELVIS WITH   Final Result         1.  Irregular hepatic contour favoring changes of cirrhosis   2.  3.2 cm fusiform infrarenal abdominal aortic aneurysm, radiographic follow-up and surveillance recommended as clinically appropriate   3.  Fat-containing right inguinal hernia   4.  Diverticulosis   5.  Atherosclerosis      [id number] VIEW   Final Result         1.  Moderate stool in the colon suggests changes of constipation, otherwise nonspecific bowel gas pattern in the upper abdomen      DX-CHEST-PORTABLE (1 VIEW)   Final Result         1.  Hazy left lung base opacity suggesting subtle infiltrate   2.  Atherosclerosis        COURSE & MEDICAL DECISION MAKING    ED Observation Status? No; Patient does not meet criteria for ED Observation.      INITIAL ASSESSMENT, COURSE AND PLAN  Care Narrative:     This is a pleasant 73-year-old male.  He lives independently.  He himself called 911 secondary to his abdominal pain which she has chronically.  History of chronic constipation, necessitating the use of laxatives to assist with bowel movements.  No prior abdominal surgery.  Denies any black or tarry stools.  He is not anticoagulated.  No fever.  Mild diffuse tenderness.  He does not have a surgical abdomen on exam.  Prior CT imaging from few years ago shows atherosclerotic changes hepatic and renal cysts and sigmoid diverticula.  Certainly diverticulitis would be in the differential as with exacerbation of chronic constipation, possibly bowel obstruction.  He has reassuring vital signs.  Per report from EMS, as well as observed here in the ED, patient has frequent ectopy.  He denies chest pain.  Will obtain an chest x-ray, abdominal film, EKG and a troponin as well as CBC, chemistry and lipase.  Will treat with IV fluids.  He will be kept n.p.o.  Will obtain CT of his abdomen and pelvis.    8:15 AM RTOC contacted as patient still assigned to CDU.  After discussion with Dr. Morgan, the CDU hospitalist, patient would meet inpatient criteria and this is a more suitable location for him.    8:53 AM discussed with Dr. Diaz, hospitalist regarding patient's presentation, workup here in the ED as well as my conversation with the CDU hospitalist.  He agrees to admit the patient to their service.    9:05 AM patient is reevaluated at the bedside.  We discussed lab results, CT imaging.    HYDRATION: Based on the patient's presentation of Dehydration and GI Bleed / Upset the patient was given IV fluids. IV Hydration was used because oral hydration was not adequate alone. Upon recheck following hydration, the patient was improved.        DISPOSITION AND DISCUSSIONS  I have discussed management of the patient with the following physicians and KIM's: Hospitalist      Discussion of management with other QHP or appropriate source(s):  None     Escalation of care considered, and ultimately not performed: None    Barriers to care at this time, including but not limited to: Patient lacks transportation .     FINAL DIAGNOSIS  1. Generalized abdominal pain    2. Constipation, unspecified constipation type    3. Hypokalemia    4. Hypocalcemia    5. Unilateral inguinal hernia without obstruction or gangrene, recurrence not specified    6. Infrarenal abdominal aortic aneurysm (AAA) without rupture (HCC)        Electronically signed by: Bea Gu D.O., 1/25/2024 4:03 AM

## 2024-01-25 NOTE — ED NOTES
Bedside report received from off going RN/tech: OTTONIEL Tidwell, assumed care of patient.  POC discussed with patient. Call light within reach, all needs addressed at this time.       Fall risk interventions in place: Move the patient closer to the nurse's station, Patient's personal possessions are with in their safe reach, Place socks on patient, Place fall risk sign on patient's door, Give patient urinal if applicable, Keep floor surfaces clean and dry, and Accompanied to restroom (all applicable per [facility] Fall risk assessment)   Continuous monitoring: Cardiac Leads, Pulse Ox, or Blood Pressure  IVF/IV medications: Not Applicable   Oxygen: How many liters 2L  Bedside sitter: Not Applicable   Isolation: Not Applicable

## 2024-01-25 NOTE — ASSESSMENT & PLAN NOTE
Incidental CT finding  3.2 cm  Hx of tobacco use, HLD and HTN  Will need ongoing monitoring outpatient  Strict BP control

## 2024-01-25 NOTE — ASSESSMENT & PLAN NOTE
Fat containing, no intestinal involvement on CT scan  monitor.  Patient states this pain is in the left lower quadrant.

## 2024-01-25 NOTE — ED NOTES
Bedside report to Madeline ALCAZAR. Pt on infusions per MAR. Pt on cardiac monitor. GCS 15 a/o x 4. Pt transported by this RN on cardiac monitor with infusions running per MAR.

## 2024-01-25 NOTE — ASSESSMENT & PLAN NOTE
Patient mentioned he quit 30 years ago, but used to use cocaine, heroin and any other illicit drugs

## 2024-01-25 NOTE — ASSESSMENT & PLAN NOTE
Corrected calcium 6.7  With severely low mag and replacing and continuing to monitor  ECG is not showing any significant abnormalities  Starting IV calcium gluconate replacement  Recheck labs in the morning   PTH, ionized calcium were within normal limits  1/27 calcium: 8.2, check ionized calcium  Transfer to medical floor 1/27

## 2024-01-25 NOTE — ASSESSMENT & PLAN NOTE
Hypoxic 87% on room air in ED, required 2LNC.  c/o SOB. Pt presented tachypneic RR 22.  No home oxygen, no prior PFT outpatient, NO prior diagnosis of asthma or COPD.  Extended tobacco use history, currently smokes marijuana.  CT A/P, on my review I do appreciate minor emphysema changes particularly LLL.  - continue oxygen support, wean as possible.  - RT consult. Wheezing to RUL.

## 2024-01-25 NOTE — PROGRESS NOTES
Received report from Denise ALCAZAR.  Assumed patient care. Assessment completed. Pt A&Ox4. Respirations are even and unlabored RA. Pt report  pain in groin, and headache. Pt is requesting to eat. Very hungry. Pt has water at bedside. Tele pt/Tele monitor in place, VS stable, call light and belongings are within reach. POC updated waiting for a bed up on the unit. Pt has infusions running. Rate varied with Denise ALCAZAR. Pt educated on room and call light, pt verbalized understanding. Needs met.

## 2024-01-25 NOTE — ED NOTES
Critical lab received via phone. ERP notified.   Calcium - 5.6  Corrected calcium - 6.7.  Potassium - 2.8.

## 2024-01-26 LAB
ALBUMIN SERPL BCP-MCNC: 3.7 G/DL (ref 3.2–4.9)
BUN SERPL-MCNC: 17 MG/DL (ref 8–22)
CALCIUM ALBUM COR SERPL-MCNC: 8.4 MG/DL (ref 8.5–10.5)
CALCIUM SERPL-MCNC: 8.2 MG/DL (ref 8.5–10.5)
CHLORIDE SERPL-SCNC: 102 MMOL/L (ref 96–112)
CHOLEST SERPL-MCNC: 83 MG/DL (ref 100–199)
CO2 SERPL-SCNC: 21 MMOL/L (ref 20–33)
CREAT SERPL-MCNC: 0.82 MG/DL (ref 0.5–1.4)
ERYTHROCYTE [DISTWIDTH] IN BLOOD BY AUTOMATED COUNT: 41 FL (ref 35.9–50)
GFR SERPLBLD CREATININE-BSD FMLA CKD-EPI: 92 ML/MIN/1.73 M 2
GLUCOSE SERPL-MCNC: 129 MG/DL (ref 65–99)
HCT VFR BLD AUTO: 40.5 % (ref 42–52)
HDLC SERPL-MCNC: 34 MG/DL
HGB BLD-MCNC: 14.6 G/DL (ref 14–18)
LDLC SERPL CALC-MCNC: 34 MG/DL
MAGNESIUM SERPL-MCNC: 2.1 MG/DL (ref 1.5–2.5)
MCH RBC QN AUTO: 32.7 PG (ref 27–33)
MCHC RBC AUTO-ENTMCNC: 36 G/DL (ref 32.3–36.5)
MCV RBC AUTO: 90.8 FL (ref 81.4–97.8)
PHOSPHATE SERPL-MCNC: 2.7 MG/DL (ref 2.5–4.5)
PLATELET # BLD AUTO: 160 K/UL (ref 164–446)
PMV BLD AUTO: 10.4 FL (ref 9–12.9)
POTASSIUM SERPL-SCNC: 4.1 MMOL/L (ref 3.6–5.5)
RBC # BLD AUTO: 4.46 M/UL (ref 4.7–6.1)
SODIUM SERPL-SCNC: 132 MMOL/L (ref 135–145)
TRIGL SERPL-MCNC: 73 MG/DL (ref 0–149)
WBC # BLD AUTO: 9.1 K/UL (ref 4.8–10.8)

## 2024-01-26 PROCEDURE — 36415 COLL VENOUS BLD VENIPUNCTURE: CPT

## 2024-01-26 PROCEDURE — A9270 NON-COVERED ITEM OR SERVICE: HCPCS

## 2024-01-26 PROCEDURE — 99233 SBSQ HOSP IP/OBS HIGH 50: CPT | Performed by: HOSPITALIST

## 2024-01-26 PROCEDURE — 80061 LIPID PANEL: CPT

## 2024-01-26 PROCEDURE — 700111 HCHG RX REV CODE 636 W/ 250 OVERRIDE (IP): Mod: JZ | Performed by: INTERNAL MEDICINE

## 2024-01-26 PROCEDURE — 700101 HCHG RX REV CODE 250: Performed by: HOSPITALIST

## 2024-01-26 PROCEDURE — 83735 ASSAY OF MAGNESIUM: CPT

## 2024-01-26 PROCEDURE — A9270 NON-COVERED ITEM OR SERVICE: HCPCS | Performed by: INTERNAL MEDICINE

## 2024-01-26 PROCEDURE — 80069 RENAL FUNCTION PANEL: CPT

## 2024-01-26 PROCEDURE — 85027 COMPLETE CBC AUTOMATED: CPT

## 2024-01-26 PROCEDURE — 770020 HCHG ROOM/CARE - TELE (206)

## 2024-01-26 PROCEDURE — 700105 HCHG RX REV CODE 258: Performed by: INTERNAL MEDICINE

## 2024-01-26 PROCEDURE — 700101 HCHG RX REV CODE 250

## 2024-01-26 PROCEDURE — 700111 HCHG RX REV CODE 636 W/ 250 OVERRIDE (IP)

## 2024-01-26 PROCEDURE — 700102 HCHG RX REV CODE 250 W/ 637 OVERRIDE(OP): Performed by: INTERNAL MEDICINE

## 2024-01-26 PROCEDURE — 700102 HCHG RX REV CODE 250 W/ 637 OVERRIDE(OP)

## 2024-01-26 RX ORDER — ENEMA 19; 7 G/133ML; G/133ML
1 ENEMA RECTAL ONCE
Status: COMPLETED | OUTPATIENT
Start: 2024-01-26 | End: 2024-01-26

## 2024-01-26 RX ADMIN — SODIUM CHLORIDE 900 ML: 9 INJECTION, SOLUTION INTRAVENOUS at 10:47

## 2024-01-26 RX ADMIN — LIDOCAINE 2 PATCH: 4 PATCH TOPICAL at 21:20

## 2024-01-26 RX ADMIN — AMLODIPINE BESYLATE 5 MG: 5 TABLET ORAL at 18:19

## 2024-01-26 RX ADMIN — LISINOPRIL 40 MG: 20 TABLET ORAL at 18:19

## 2024-01-26 RX ADMIN — OXYCODONE 5 MG: 5 TABLET ORAL at 04:29

## 2024-01-26 RX ADMIN — OXYCODONE 5 MG: 5 TABLET ORAL at 09:36

## 2024-01-26 RX ADMIN — MAGNESIUM HYDROXIDE 30 ML: 1200 LIQUID ORAL at 09:41

## 2024-01-26 RX ADMIN — TAMSULOSIN HYDROCHLORIDE 0.4 MG: 0.4 CAPSULE ORAL at 09:36

## 2024-01-26 RX ADMIN — Medication 500 MG: at 13:07

## 2024-01-26 RX ADMIN — ACETAMINOPHEN 650 MG: 325 TABLET, FILM COATED ORAL at 21:21

## 2024-01-26 RX ADMIN — HEPARIN SODIUM 5000 UNITS: 5000 INJECTION, SOLUTION INTRAVENOUS; SUBCUTANEOUS at 13:07

## 2024-01-26 RX ADMIN — POLYETHYLENE GLYCOL 3350 1 PACKET: 17 POWDER, FOR SOLUTION ORAL at 09:41

## 2024-01-26 RX ADMIN — DOCUSATE SODIUM 50 MG AND SENNOSIDES 8.6 MG 2 TABLET: 8.6; 5 TABLET, FILM COATED ORAL at 18:19

## 2024-01-26 RX ADMIN — BISACODYL 10 MG: 10 SUPPOSITORY RECTAL at 13:04

## 2024-01-26 RX ADMIN — HEPARIN SODIUM 5000 UNITS: 5000 INJECTION, SOLUTION INTRAVENOUS; SUBCUTANEOUS at 04:30

## 2024-01-26 RX ADMIN — LACTULOSE 15 ML: 20 SOLUTION ORAL at 04:30

## 2024-01-26 RX ADMIN — SODIUM CHLORIDE: 9 INJECTION, SOLUTION INTRAVENOUS at 00:37

## 2024-01-26 RX ADMIN — HYDROMORPHONE HYDROCHLORIDE 0.25 MG: 1 INJECTION, SOLUTION INTRAMUSCULAR; INTRAVENOUS; SUBCUTANEOUS at 13:16

## 2024-01-26 RX ADMIN — ONDANSETRON 4 MG: 2 INJECTION INTRAMUSCULAR; INTRAVENOUS at 07:22

## 2024-01-26 RX ADMIN — ATORVASTATIN CALCIUM 20 MG: 20 TABLET, FILM COATED ORAL at 21:22

## 2024-01-26 RX ADMIN — HYDROMORPHONE HYDROCHLORIDE 0.25 MG: 1 INJECTION, SOLUTION INTRAMUSCULAR; INTRAVENOUS; SUBCUTANEOUS at 07:16

## 2024-01-26 RX ADMIN — DOCUSATE SODIUM 50 MG AND SENNOSIDES 8.6 MG 2 TABLET: 8.6; 5 TABLET, FILM COATED ORAL at 04:29

## 2024-01-26 RX ADMIN — HEPARIN SODIUM 5000 UNITS: 5000 INJECTION, SOLUTION INTRAVENOUS; SUBCUTANEOUS at 21:21

## 2024-01-26 RX ADMIN — LACTULOSE 15 ML: 20 SOLUTION ORAL at 18:19

## 2024-01-26 RX ADMIN — SODIUM CHLORIDE: 9 INJECTION, SOLUTION INTRAVENOUS at 21:45

## 2024-01-26 RX ADMIN — SODIUM PHOSPHATE 133 ML: 7; 19 ENEMA RECTAL at 17:30

## 2024-01-26 ASSESSMENT — ENCOUNTER SYMPTOMS
SPEECH CHANGE: 0
FEVER: 0
ABDOMINAL PAIN: 1
BLOOD IN STOOL: 0
PALPITATIONS: 0
NERVOUS/ANXIOUS: 0
DIARRHEA: 0
NAUSEA: 0
BACK PAIN: 0
VOMITING: 0
SHORTNESS OF BREATH: 0
HEADACHES: 0

## 2024-01-26 ASSESSMENT — PAIN DESCRIPTION - PAIN TYPE
TYPE: ACUTE PAIN

## 2024-01-26 ASSESSMENT — FIBROSIS 4 INDEX: FIB4 SCORE: 1

## 2024-01-26 NOTE — CARE PLAN
The patient is Stable - Low risk of patient condition declining or worsening    Shift Goals  Clinical Goals: Pain Management/Monitor Labs/Bladder Scan  Patient Goals: Rest/Pain Control  Family Goals: RADHA    Progress made toward(s) clinical / shift goals: Assumed care of patient at 1915. Patient is A&Ox4.  in place, patient does not require oxygen. SBP < 180. Mccabe care completed. Labs are being monitored closely: potassium, magnesium and calcium. Fall precautions are in place. Bed is low and locked, bed alarm is on, call light is within reach, hourly rounding continues.     Problem: Pain - Standard  Goal: Alleviation of pain or a reduction in pain to the patient’s comfort goal  Outcome: Progressing  Note: PRN Oxycodone 5mg given to alleviate pain, see MAR.      Problem: Fall Risk  Goal: Patient will remain free from falls  Outcome: Progressing    Patient is not progressing towards the following goals:    Problem: Urinary Elimination  Goal: Establish and maintain regular urinary output  Outcome: Not Progressing  Note: Mccabe Catheter placed.

## 2024-01-26 NOTE — PROGRESS NOTES
Monitor Summary: SR 72-83, NM 0.19, QRS 0.07, QT 0.33, with rare/occasional PVCs per strip from monitor room.

## 2024-01-26 NOTE — PROGRESS NOTES
[facility] Medicine Daily Progress Note    Date of Service  1/26/2024    Chief Complaint  Abdominal pain    Hospital Course  Fausto Martin is a 73 y.o. male with a history of lumbar radiculopathy, hypertension, dyslipidemia, chronic constipation, questionable irritable bowel on Linzess.  He was admitted 1/25/2024 with complaint of ongoing abdominal pain with concern of acute on chronic discomfort.  Patient states over the past year he has been having ongoing abdominal pain.  Complains of last bowel movement being 4 days prior to presentation.  Now he presented with constipation, shortness of breath and tremors.  Workup in the emergency room showed significant electrolyte discrepancies with a potassium of 2.8, calcium 5.6, with a corrected calcium of 6.7, and a magnesium of 1.1.  His lactic acid was within normal limits.  CT of the abdomen and pelvis showed concern of of changes for cirrhosis, 3.2 cm fusiform infrarenal abdominal aortic aneurysm, fat-containing right inguinal hernia and diverticulosis.  An abdominal x-ray suggested constipation.    Interval Problem Update  1/26: Complains that he has not had a bowel movement in 9 days.  States some left lower quadrant abdominal discomfort.  States he had been eating fairly normal diet with solid foods over the past week.  Patient denies any nausea vomiting.  He has not had any fevers.  He denies any diarrhea.  Does state he eats vegetables but not a lot of them.  He denies any overuse of stool softeners.  Patient states having a colonoscopy in the past year by Dr. Reed muro.    I have discussed this patient's plan of care and discharge plan at IDT rounds today with Case Management, Nursing, Nursing leadership, and other members of the IDT team.      Code Status  Full Code    Disposition  The patient is not medically cleared for discharge to home or a post-acute facility.      I have placed the appropriate orders for post-discharge needs.    Review of  Systems  Review of Systems   Constitutional:  Negative for fever and malaise/fatigue.   Respiratory:  Negative for shortness of breath.    Cardiovascular:  Negative for chest pain and palpitations.   Gastrointestinal:  Positive for abdominal pain. Negative for blood in stool, diarrhea, melena, nausea and vomiting.   Genitourinary:  Negative for dysuria and frequency.   Musculoskeletal:  Negative for back pain.   Neurological:  Negative for speech change and headaches.   Psychiatric/Behavioral:  The patient is not nervous/anxious.         Physical Exam  Temp:  [36.1 °C (96.9 °F)-36.6 °C (97.9 °F)] 36.2 °C (97.1 °F)  Pulse:  [79-95] 95  Resp:  [17-18] 17  BP: (130-154)/(75-92) 148/91  SpO2:  [92 %-94 %] 94 %    Physical Exam  Vitals reviewed.   Constitutional:       Appearance: Normal appearance. He is not ill-appearing or diaphoretic.   HENT:      Head: Normocephalic and atraumatic.      Nose: Nose normal.      Mouth/Throat:      Mouth: Mucous membranes are moist.      Pharynx: No oropharyngeal exudate.   Eyes:      General: No scleral icterus.        Right eye: No discharge.         Left eye: No discharge.      Extraocular Movements: Extraocular movements intact.      Conjunctiva/sclera: Conjunctivae normal.   Cardiovascular:      Rate and Rhythm: Normal rate and regular rhythm.      Pulses:           Radial pulses are 2+ on the right side and 2+ on the left side.        Dorsalis pedis pulses are 2+ on the right side and 2+ on the left side.      Heart sounds: No murmur heard.  Pulmonary:      Effort: Pulmonary effort is normal. No respiratory distress.      Breath sounds: Normal breath sounds. No wheezing or rales.   Abdominal:      General: Bowel sounds are normal. There is no distension.      Palpations: Abdomen is soft. There is no mass.      Tenderness: There is no abdominal tenderness. There is no rebound.   Musculoskeletal:         General: No swelling or tenderness.      Cervical back: No tenderness. No  muscular tenderness.      Right lower leg: No edema.      Left lower leg: No edema.   Lymphadenopathy:      Cervical: No cervical adenopathy.   Skin:     Coloration: Skin is not jaundiced or pale.   Neurological:      General: No focal deficit present.      Mental Status: He is alert and oriented to person, place, and time. Mental status is at baseline.      Cranial Nerves: No cranial nerve deficit.   Psychiatric:         Mood and Affect: Mood normal.         Behavior: Behavior normal.         Fluids    Intake/Output Summary (Last 24 hours) at 1/26/2024 1725  Last data filed at 1/26/2024 0400  Gross per 24 hour   Intake 480 ml   Output 700 ml   Net -220 ml       Laboratory  Recent Labs     01/25/24  0358 01/26/24  0757   WBC 15.4* 9.1   RBC 4.85 4.46*   HEMOGLOBIN 15.5 14.6   HEMATOCRIT 43.9 40.5*   MCV 90.5 90.8   MCH 32.0 32.7   MCHC 35.3 36.0   RDW 40.5 41.0   PLATELETCT 219 160*   MPV 11.0 10.4     Recent Labs     01/25/24  0454 01/26/24  0757   SODIUM 138 132*   POTASSIUM 2.8* 4.1   CHLORIDE 113* 102   CO2 17* 21   GLUCOSE 116* 129*   BUN 14 17   CREATININE 0.67 0.82   CALCIUM 5.6* 8.2*     Recent Labs     01/25/24  0358   INR 1.12         Recent Labs     01/26/24  0757   TRIGLYCERIDE 73   HDL 34*   LDL 34       Imaging  CT-ABDOMEN-PELVIS WITH   Final Result         1.  Irregular hepatic contour favoring changes of cirrhosis   2.  3.2 cm fusiform infrarenal abdominal aortic aneurysm, radiographic follow-up and surveillance recommended as clinically appropriate   3.  Fat-containing right inguinal hernia   4.  Diverticulosis   5.  Atherosclerosis      [id number] VIEW   Final Result         1.  Moderate stool in the colon suggests changes of constipation, otherwise nonspecific bowel gas pattern in the upper abdomen      DX-CHEST-PORTABLE (1 VIEW)   Final Result         1.  Hazy left lung base opacity suggesting subtle infiltrate   2.  Atherosclerosis           Assessment/Plan  * Idiopathic hypocalcemia- (present  on admission)  Assessment & Plan  Corrected calcium 6.7  With severely low mag and replacing and continuing to monitor  ECG is not showing any significant abnormalities  Starting IV calcium gluconate replacement  Recheck labs in the morning  Checking PTH, ionized calcium  Admit to telemetry    Advanced care planning/counseling discussion- (present on admission)  Assessment & Plan  Full code    Mild tetrahydrocannabinol (THC) abuse- (present on admission)  Assessment & Plan  Patient reports ongoing smoking marijuana  Counseled on admission for cessation    Hypomagnesemia- (present on admission)  Assessment & Plan  Low serum magnesium levels, 1.1  Recheck and replace as needed    History of tobacco abuse- (present on admission)  Assessment & Plan  Pt reported 1-2 pack per day use, smoked tobacco for over 50 years, quit 10 years ago.  Wheezing to RUL on exam  CXR showing possible LLL pneumonia  - RT consult      History of drug abuse (HCC)- (present on admission)  Assessment & Plan  Patient mentioned he quit 30 years ago, but used to use cocaine, heroin and any other illicit drugs    Acute hypoxemic respiratory failure (HCC)- (present on admission)  Assessment & Plan  Hypoxic 87% on room air in ED, required 2LNC.  c/o SOB. Pt presented tachypneic RR 22.  No home oxygen, no prior PFT outpatient, NO prior diagnosis of asthma or COPD.  Extended tobacco use history, currently smokes marijuana.  CT A/P, on my review I do appreciate minor emphysema changes particularly LLL.  - continue oxygen support, wean as possible.  - RT consult. Wheezing to RUL.    Infrarenal abdominal aortic aneurysm (AAA) without rupture (HCC)- (present on admission)  Assessment & Plan  Incidental CT finding  3.2 cm  Hx of tobacco use, HLD and HTN  Will need ongoing monitoring outpatient  Strict BP control    Right Inguinal hernia without obstruction or gangrene- (present on admission)  Assessment & Plan  Fat containing, no intestinal involvement on  CT scan  monitor.  Patient states this pain is in the left lower quadrant.    Diverticulosis- (present on admission)  Assessment & Plan  Due to chronic constipation    Urinary retention- (present on admission)  Assessment & Plan  Flomax initiated  Await clearance of hematuria that initiated after Mccabe placement before removal  IV fluids    SIRS (systemic inflammatory response syndrome) (HCC)- (present on admission)  Assessment & Plan  Improvement of WBC with question of leukemoid reaction      Hypokalemia- (present on admission)  Assessment & Plan  Potassium 2.8 admission  1/26 K: 4.1  Recheck labs in the morning  Monitor on telemetry    Chronic idiopathic constipation- (present on admission)  Assessment & Plan  Acute on chronic  Seen on x-ray  Starting lactulose and BM regimen  1/26 enema ordered.         VTE prophylaxis:    heparin ppx

## 2024-01-26 NOTE — PROGRESS NOTES
4 Eyes Skin Assessment Completed by OTTONIEL Mejia and OTTONIEL De Souza.    Head WDL  Ears WDL  Nose WDL  Mouth WDL  Neck WDL  Breast/Chest WDL  Shoulder Blades WDL  Spine WDL  (R) Arm/Elbow/Hand WDL  (L) Arm/Elbow/Hand WDL  Abdomen WDL  Groin WDL  Scrotum/Coccyx/Buttocks Redness and Blanching  (R) Leg WDL  (L) Leg WDL  (R) Heel/Foot/Toe Boggy  (L) Heel/Foot/Toe Discoloration and Boggy          Devices In Places Tele Box and Pulse Ox      Interventions In Place Gray Ear Foams and Pillows    Possible Skin Injury No    Pictures Uploaded Into Epic No, needs to be completed  Wound Consult Placed N/A  RN Wound Prevention Protocol Ordered No

## 2024-01-27 PROBLEM — R10.9 ABDOMINAL PAIN: Status: ACTIVE | Noted: 2024-01-27

## 2024-01-27 LAB
ANION GAP SERPL CALC-SCNC: 9 MMOL/L (ref 7–16)
BACTERIA UR CULT: NORMAL
BUN SERPL-MCNC: 24 MG/DL (ref 8–22)
CALCIUM SERPL-MCNC: 8.2 MG/DL (ref 8.5–10.5)
CHLORIDE SERPL-SCNC: 102 MMOL/L (ref 96–112)
CO2 SERPL-SCNC: 21 MMOL/L (ref 20–33)
CREAT SERPL-MCNC: 1.17 MG/DL (ref 0.5–1.4)
ERYTHROCYTE [DISTWIDTH] IN BLOOD BY AUTOMATED COUNT: 41.5 FL (ref 35.9–50)
GFR SERPLBLD CREATININE-BSD FMLA CKD-EPI: 65 ML/MIN/1.73 M 2
GLUCOSE SERPL-MCNC: 131 MG/DL (ref 65–99)
HCT VFR BLD AUTO: 37.8 % (ref 42–52)
HGB BLD-MCNC: 13.5 G/DL (ref 14–18)
MAGNESIUM SERPL-MCNC: 2.1 MG/DL (ref 1.5–2.5)
MCH RBC QN AUTO: 32.7 PG (ref 27–33)
MCHC RBC AUTO-ENTMCNC: 35.7 G/DL (ref 32.3–36.5)
MCV RBC AUTO: 91.5 FL (ref 81.4–97.8)
PLATELET # BLD AUTO: 155 K/UL (ref 164–446)
PMV BLD AUTO: 10.5 FL (ref 9–12.9)
POTASSIUM SERPL-SCNC: 4.5 MMOL/L (ref 3.6–5.5)
RBC # BLD AUTO: 4.13 M/UL (ref 4.7–6.1)
SIGNIFICANT IND 70042: NORMAL
SITE SITE: NORMAL
SODIUM SERPL-SCNC: 132 MMOL/L (ref 135–145)
SOURCE SOURCE: NORMAL
TSH SERPL DL<=0.005 MIU/L-ACNC: 1.79 UIU/ML (ref 0.38–5.33)
WBC # BLD AUTO: 10.7 K/UL (ref 4.8–10.8)

## 2024-01-27 PROCEDURE — 36415 COLL VENOUS BLD VENIPUNCTURE: CPT

## 2024-01-27 PROCEDURE — 306015 LOCK STAT FOLEY: Performed by: HOSPITALIST

## 2024-01-27 PROCEDURE — 83735 ASSAY OF MAGNESIUM: CPT

## 2024-01-27 PROCEDURE — 700102 HCHG RX REV CODE 250 W/ 637 OVERRIDE(OP): Performed by: INTERNAL MEDICINE

## 2024-01-27 PROCEDURE — 84443 ASSAY THYROID STIM HORMONE: CPT

## 2024-01-27 PROCEDURE — 700102 HCHG RX REV CODE 250 W/ 637 OVERRIDE(OP)

## 2024-01-27 PROCEDURE — A9270 NON-COVERED ITEM OR SERVICE: HCPCS

## 2024-01-27 PROCEDURE — 80048 BASIC METABOLIC PNL TOTAL CA: CPT

## 2024-01-27 PROCEDURE — 99232 SBSQ HOSP IP/OBS MODERATE 35: CPT | Performed by: HOSPITALIST

## 2024-01-27 PROCEDURE — A9270 NON-COVERED ITEM OR SERVICE: HCPCS | Performed by: INTERNAL MEDICINE

## 2024-01-27 PROCEDURE — 700111 HCHG RX REV CODE 636 W/ 250 OVERRIDE (IP): Mod: JZ | Performed by: INTERNAL MEDICINE

## 2024-01-27 PROCEDURE — 770001 HCHG ROOM/CARE - MED/SURG/GYN PRIV*

## 2024-01-27 PROCEDURE — 700101 HCHG RX REV CODE 250

## 2024-01-27 PROCEDURE — 85027 COMPLETE CBC AUTOMATED: CPT

## 2024-01-27 RX ORDER — ALUMINA, MAGNESIA, AND SIMETHICONE 2400; 2400; 240 MG/30ML; MG/30ML; MG/30ML
10 SUSPENSION ORAL 4 TIMES DAILY PRN
Status: DISCONTINUED | OUTPATIENT
Start: 2024-01-27 | End: 2024-01-28 | Stop reason: HOSPADM

## 2024-01-27 RX ORDER — ENEMA 19; 7 G/133ML; G/133ML
1 ENEMA RECTAL ONCE
Status: DISCONTINUED | OUTPATIENT
Start: 2024-01-27 | End: 2024-01-28 | Stop reason: HOSPADM

## 2024-01-27 RX ORDER — OMEPRAZOLE 20 MG/1
20 CAPSULE, DELAYED RELEASE ORAL DAILY
Status: DISCONTINUED | OUTPATIENT
Start: 2024-01-27 | End: 2024-01-28 | Stop reason: HOSPADM

## 2024-01-27 RX ADMIN — HEPARIN SODIUM 5000 UNITS: 5000 INJECTION, SOLUTION INTRAVENOUS; SUBCUTANEOUS at 13:11

## 2024-01-27 RX ADMIN — DOCUSATE SODIUM 50 MG AND SENNOSIDES 8.6 MG 2 TABLET: 8.6; 5 TABLET, FILM COATED ORAL at 05:21

## 2024-01-27 RX ADMIN — DOCUSATE SODIUM 50 MG AND SENNOSIDES 8.6 MG 2 TABLET: 8.6; 5 TABLET, FILM COATED ORAL at 17:06

## 2024-01-27 RX ADMIN — BISACODYL 10 MG: 10 SUPPOSITORY RECTAL at 05:21

## 2024-01-27 RX ADMIN — LACTULOSE 15 ML: 20 SOLUTION ORAL at 05:21

## 2024-01-27 RX ADMIN — AMLODIPINE BESYLATE 5 MG: 5 TABLET ORAL at 17:06

## 2024-01-27 RX ADMIN — POLYETHYLENE GLYCOL 3350 1 PACKET: 17 POWDER, FOR SOLUTION ORAL at 05:21

## 2024-01-27 RX ADMIN — ALUMINUM HYDROXIDE, MAGNESIUM HYDROXIDE, AND DIMETHICONE 10 ML: 400; 400; 40 SUSPENSION ORAL at 05:21

## 2024-01-27 RX ADMIN — LIDOCAINE 2 PATCH: 4 PATCH TOPICAL at 20:57

## 2024-01-27 RX ADMIN — OXYCODONE 5 MG: 5 TABLET ORAL at 02:26

## 2024-01-27 RX ADMIN — ALUMINUM HYDROXIDE, MAGNESIUM HYDROXIDE, AND DIMETHICONE 10 ML: 400; 400; 40 SUSPENSION ORAL at 00:51

## 2024-01-27 RX ADMIN — LISINOPRIL 40 MG: 20 TABLET ORAL at 17:07

## 2024-01-27 RX ADMIN — HEPARIN SODIUM 5000 UNITS: 5000 INJECTION, SOLUTION INTRAVENOUS; SUBCUTANEOUS at 05:21

## 2024-01-27 RX ADMIN — OMEPRAZOLE 20 MG: 20 CAPSULE, DELAYED RELEASE ORAL at 05:21

## 2024-01-27 RX ADMIN — HEPARIN SODIUM 5000 UNITS: 5000 INJECTION, SOLUTION INTRAVENOUS; SUBCUTANEOUS at 20:57

## 2024-01-27 RX ADMIN — ACETAMINOPHEN 650 MG: 325 TABLET, FILM COATED ORAL at 19:04

## 2024-01-27 RX ADMIN — Medication 500 MG: at 10:05

## 2024-01-27 RX ADMIN — MAGNESIUM HYDROXIDE 30 ML: 1200 LIQUID ORAL at 17:06

## 2024-01-27 RX ADMIN — LACTULOSE 15 ML: 20 SOLUTION ORAL at 17:06

## 2024-01-27 RX ADMIN — ATORVASTATIN CALCIUM 20 MG: 20 TABLET, FILM COATED ORAL at 20:57

## 2024-01-27 RX ADMIN — TAMSULOSIN HYDROCHLORIDE 0.4 MG: 0.4 CAPSULE ORAL at 10:05

## 2024-01-27 ASSESSMENT — LIFESTYLE VARIABLES
HOW MANY TIMES IN THE PAST YEAR HAVE YOU HAD 5 OR MORE DRINKS IN A DAY: 0
EVER FELT BAD OR GUILTY ABOUT YOUR DRINKING: NO
ALCOHOL_USE: NO
AVERAGE NUMBER OF DAYS PER WEEK YOU HAVE A DRINK CONTAINING ALCOHOL: 0
TOTAL SCORE: 0
TOTAL SCORE: 0
CONSUMPTION TOTAL: NEGATIVE
TOTAL SCORE: 0
EVER HAD A DRINK FIRST THING IN THE MORNING TO STEADY YOUR NERVES TO GET RID OF A HANGOVER: NO
HAVE YOU EVER FELT YOU SHOULD CUT DOWN ON YOUR DRINKING: NO
ON A TYPICAL DAY WHEN YOU DRINK ALCOHOL HOW MANY DRINKS DO YOU HAVE: 0
HAVE PEOPLE ANNOYED YOU BY CRITICIZING YOUR DRINKING: NO

## 2024-01-27 ASSESSMENT — ENCOUNTER SYMPTOMS
NERVOUS/ANXIOUS: 0
BLOOD IN STOOL: 0
SHORTNESS OF BREATH: 0
ABDOMINAL PAIN: 1
SPEECH CHANGE: 0
DIARRHEA: 0
PALPITATIONS: 0
HEADACHES: 0
SORE THROAT: 0
VOMITING: 0
NAUSEA: 0
BACK PAIN: 0
FEVER: 0

## 2024-01-27 ASSESSMENT — PAIN DESCRIPTION - PAIN TYPE
TYPE: ACUTE PAIN
TYPE: ACUTE PAIN

## 2024-01-27 ASSESSMENT — FIBROSIS 4 INDEX: FIB4 SCORE: 1.41

## 2024-01-27 NOTE — PROGRESS NOTES
MONITOR SUMMARY:    Rhythm: 72-97  Rate: SR  Ectopy: (r) PVC, (o) PVC   .15/.08.36            Normal Values  Rhythm SR  HR Range    Measurements   0.12-0.20 / 0.06-0.10  / 0.30-0.52

## 2024-01-27 NOTE — CARE PLAN
The patient is Stable - Low risk of patient condition declining or worsening    Shift Goals  Clinical Goals: Monitor Labs, have BM  Patient Goals: BM, pain control  Family Goals: na    Progress made toward(s) clinical / shift goals:  Downgrade to med surg      Problem: Pain - Standard  Goal: Alleviation of pain or a reduction in pain to the patient’s comfort goal  Outcome: Progressing     Problem: Knowledge Deficit - Standard  Goal: Patient and family/care givers will demonstrate understanding of plan of care, disease process/condition, diagnostic tests and medications  Outcome: Progressing     Problem: Hemodynamics  Goal: Patient's hemodynamics, fluid balance and neurologic status will be stable or improve  Outcome: Progressing     Problem: Fluid Volume  Goal: Fluid volume balance will be maintained  Outcome: Progressing     Problem: Urinary - Renal Perfusion  Goal: Ability to achieve and maintain adequate renal perfusion and functioning will improve  Outcome: Progressing     Problem: Respiratory  Goal: Patient will achieve/maintain optimum respiratory ventilation and gas exchange  Outcome: Progressing     Problem: Physical Regulation  Goal: Diagnostic test results will improve  Outcome: Progressing  Goal: Signs and symptoms of infection will decrease  Outcome: Progressing     Problem: Fall Risk  Goal: Patient will remain free from falls  Outcome: Progressing     Problem: Skin Integrity  Goal: Skin integrity is maintained or improved  Outcome: Progressing     Problem: Urinary Elimination  Goal: Establish and maintain regular urinary output  Outcome: Progressing     Problem: Mechanical Ventilation  Goal: Safe management of artificial airway and ventilation  Outcome: Met  Goal: Successful weaning off mechanical ventilator, spontaneously maintains adequate gas exchange  Outcome: Met  Goal: Patient will be able to express needs and understand communication  Outcome: Met       Patient is not progressing towards the  following goals:

## 2024-01-27 NOTE — PROGRESS NOTES
Report received from outgoing nurse, care transferred at 1900. Patient currently in  on the monitor and A&Ox 4. Patient complaining of consistent pain in penis, hematuria  (light pink) noted with clots. Patient having frequent small amounts of liquid stool. Whiteboard updated with plan for the day and names of staff. No other questions or concerns at this time from the patient. Call light within reach, fall precautions in place.

## 2024-01-27 NOTE — PROGRESS NOTES
[facility] Medicine Daily Progress Note    Date of Service  1/27/2024    Chief Complaint  Abdominal pain    Hospital Course  Fausto Martin is a 73 y.o. male with a history of lumbar radiculopathy, hypertension, dyslipidemia, chronic constipation, questionable irritable bowel on Linzess.  He was admitted 1/25/2024 with complaint of ongoing abdominal pain with concern of acute on chronic discomfort.  Patient states over the past year he has been having ongoing abdominal pain.  Complains of last bowel movement being 4 days prior to presentation.  Now he presented with constipation, shortness of breath and tremors.  Workup in the emergency room showed significant electrolyte discrepancies with a potassium of 2.8, calcium 5.6, with a corrected calcium of 6.7, and a magnesium of 1.1.  His lactic acid was within normal limits.  CT of the abdomen and pelvis showed concern of of changes for cirrhosis, 3.2 cm fusiform infrarenal abdominal aortic aneurysm, fat-containing right inguinal hernia and diverticulosis.  An abdominal x-ray suggested constipation.    Interval Problem Update  1/27: Patient with urinary retention last night despite Mccabe catheter spite flushing unable to have urine output, Mccabe was removed and replaced with a new coudé tipped catheter.  Patient had good urine output thereafter per nursing note.  Patient states he remains weak.  He has some mild abdominal pain crampy in nature.  Had 2 bowel movements last night this morning 1 that was liquid.  States lack of appetite.  He has a friend at bedside visiting states this for and looks weaker.  Patient had a difficult time sitting up in bed on his own accord.  Patient states he gets Meals on Wheels delivered to his house.    1/26: Complains that he has not had a bowel movement in 9 days.  States some left lower quadrant abdominal discomfort.  States he had been eating fairly normal diet with solid foods over the past week.  Patient denies any nausea  vomiting.  He has not had any fevers.  He denies any diarrhea.  Does state he eats vegetables but not a lot of them.  He denies any overuse of stool softeners.  Patient states having a colonoscopy in the past year by Dr. Reed muro.    I have discussed this patient's plan of care and discharge plan at IDT rounds today with Case Management, Nursing, Nursing leadership, and other members of the IDT team.      Code Status  Full Code    Disposition  The patient is not medically cleared for discharge to home or a post-acute facility.      I have placed the appropriate orders for post-discharge needs.    Review of Systems  Review of Systems   Constitutional:  Positive for malaise/fatigue. Negative for fever.   HENT:  Negative for sore throat.    Respiratory:  Negative for shortness of breath.    Cardiovascular:  Negative for chest pain, palpitations and leg swelling.   Gastrointestinal:  Positive for abdominal pain. Negative for blood in stool, diarrhea, melena, nausea and vomiting.   Genitourinary:  Negative for dysuria and frequency.   Musculoskeletal:  Negative for back pain.   Neurological:  Negative for speech change and headaches.   Psychiatric/Behavioral:  The patient is not nervous/anxious.         Physical Exam  Temp:  [36.2 °C (97.1 °F)-37.1 °C (98.8 °F)] 37.1 °C (98.8 °F)  Pulse:  [83-95] 84  Resp:  [17-18] 18  BP: (112-148)/(74-91) 127/85  SpO2:  [93 %-96 %] 96 %    Physical Exam  Vitals reviewed.   Constitutional:       Appearance: Normal appearance. He is overweight. He is not ill-appearing or diaphoretic.   HENT:      Head: Normocephalic and atraumatic.      Nose: Nose normal.      Mouth/Throat:      Mouth: Mucous membranes are moist.      Pharynx: No oropharyngeal exudate.   Eyes:      General: No scleral icterus.        Right eye: No discharge.         Left eye: No discharge.      Extraocular Movements: Extraocular movements intact.      Conjunctiva/sclera: Conjunctivae normal.   Cardiovascular:       Rate and Rhythm: Normal rate and regular rhythm.      Pulses:           Radial pulses are 2+ on the right side and 2+ on the left side.        Dorsalis pedis pulses are 2+ on the right side and 2+ on the left side.      Heart sounds: No murmur heard.  Pulmonary:      Effort: Pulmonary effort is normal. No respiratory distress.      Breath sounds: Normal breath sounds. No wheezing or rales.   Abdominal:      General: Bowel sounds are normal. There is no distension.      Palpations: Abdomen is soft. There is no mass.      Tenderness: There is no abdominal tenderness. There is no rebound.   Musculoskeletal:         General: No swelling or tenderness.      Cervical back: Normal range of motion. No muscular tenderness.      Right lower leg: No edema.      Left lower leg: No edema.   Skin:     Coloration: Skin is not jaundiced or pale.   Neurological:      General: No focal deficit present.      Mental Status: He is alert and oriented to person, place, and time. Mental status is at baseline.      Cranial Nerves: No cranial nerve deficit.   Psychiatric:         Mood and Affect: Mood normal.         Behavior: Behavior normal.         Fluids    Intake/Output Summary (Last 24 hours) at 1/27/2024 1412  Last data filed at 1/27/2024 1007  Gross per 24 hour   Intake 4123.88 ml   Output 3250 ml   Net 873.88 ml       Laboratory  Recent Labs     01/25/24  0358 01/26/24  0757 01/27/24  0146   WBC 15.4* 9.1 10.7   RBC 4.85 4.46* 4.13*   HEMOGLOBIN 15.5 14.6 13.5*   HEMATOCRIT 43.9 40.5* 37.8*   MCV 90.5 90.8 91.5   MCH 32.0 32.7 32.7   MCHC 35.3 36.0 35.7   RDW 40.5 41.0 41.5   PLATELETCT 219 160* 155*   MPV 11.0 10.4 10.5     Recent Labs     01/25/24  0454 01/26/24  0757 01/27/24  0146   SODIUM 138 132* 132*   POTASSIUM 2.8* 4.1 4.5   CHLORIDE 113* 102 102   CO2 17* 21 21   GLUCOSE 116* 129* 131*   BUN 14 17 24*   CREATININE 0.67 0.82 1.17   CALCIUM 5.6* 8.2* 8.2*     Recent Labs     01/25/24  0358   INR 1.12         Recent Labs      01/26/24  0757   TRIGLYCERIDE 73   HDL 34*   LDL 34       Imaging  CT-ABDOMEN-PELVIS WITH   Final Result         1.  Irregular hepatic contour favoring changes of cirrhosis   2.  3.2 cm fusiform infrarenal abdominal aortic aneurysm, radiographic follow-up and surveillance recommended as clinically appropriate   3.  Fat-containing right inguinal hernia   4.  Diverticulosis   5.  Atherosclerosis      [id number] VIEW   Final Result         1.  Moderate stool in the colon suggests changes of constipation, otherwise nonspecific bowel gas pattern in the upper abdomen      DX-CHEST-PORTABLE (1 VIEW)   Final Result         1.  Hazy left lung base opacity suggesting subtle infiltrate   2.  Atherosclerosis           Assessment/Plan  * Idiopathic hypocalcemia- (present on admission)  Assessment & Plan  Corrected calcium 6.7  With severely low mag and replacing and continuing to monitor  ECG is not showing any significant abnormalities  Starting IV calcium gluconate replacement  Recheck labs in the morning   PTH, ionized calcium were within normal limits  1/27 calcium: 8.2, check ionized calcium  Transfer to medical floor 1/27    Advanced care planning/counseling discussion- (present on admission)  Assessment & Plan  Full code    Mild tetrahydrocannabinol (THC) abuse- (present on admission)  Assessment & Plan  Patient reports ongoing smoking marijuana  Counseled on admission for cessation    Hypomagnesemia- (present on admission)  Assessment & Plan  Low serum magnesium levels, 1.1  Recheck and replace as needed    History of tobacco abuse- (present on admission)  Assessment & Plan  Pt reported 1-2 pack per day use, smoked tobacco for over 50 years, quit 10 years ago.  Wheezing to RUL on exam  CXR showing possible LLL pneumonia  - RT consult      History of drug abuse (HCC)- (present on admission)  Assessment & Plan  Patient mentioned he quit 30 years ago, but used to use cocaine, heroin and any other illicit drugs    Acute  hypoxemic respiratory failure (HCC)- (present on admission)  Assessment & Plan  Hypoxic 87% on room air in ED, required 2LNC.  c/o SOB. Pt presented tachypneic RR 22.  No home oxygen, no prior PFT outpatient, NO prior diagnosis of asthma or COPD.  Extended tobacco use history, currently smokes marijuana.  CT A/P, on my review I do appreciate minor emphysema changes particularly LLL.  - continue oxygen support, wean as possible.  - RT consult. Wheezing to RUL.      Infrarenal abdominal aortic aneurysm (AAA) without rupture (HCC)- (present on admission)  Assessment & Plan  Incidental CT finding  3.2 cm  Hx of tobacco use, HLD and HTN  Will need ongoing monitoring outpatient  Strict BP control    Right Inguinal hernia without obstruction or gangrene- (present on admission)  Assessment & Plan  Fat containing, no intestinal involvement on CT scan  monitor.  Patient states this pain is in the left lower quadrant.    Diverticulosis- (present on admission)  Assessment & Plan  Due to chronic constipation    Urinary retention- (present on admission)  Assessment & Plan  Flomax initiated  Await clearance of hematuria that initiated after Mccabe placement before removal  1/26 overnight had blood clot develop, causing urinary retention with existing Mccabe catheter and despite flushing there is no drainage.  A new catheter with a coudé was placed and he had urinary drainage.  He had clearance of his hematuria.  IV fluids    SIRS (systemic inflammatory response syndrome) (HCC)- (present on admission)  Assessment & Plan  Improvement of WBC with question of leukemoid reaction  1/27 WBC 10.7 and afebrile.      Hypokalemia- (present on admission)  Assessment & Plan  Potassium 2.8 admission  1/26 K: 4.1  Recheck labs in the morning  Monitor on telemetry    Chronic idiopathic constipation- (present on admission)  Assessment & Plan  Acute on chronic  Seen on x-ray  Starting lactulose and BM regimen  1/26 enema ordered.         VTE  prophylaxis:    heparin ppx

## 2024-01-27 NOTE — PROGRESS NOTES
Patient complaining of 8/10 sharp pain that is tender upon palpation in lower abdomen. Patient bladder scanned w/ 866mL in Bladder. Current prater has 500mL of output up from 100mL at start of shift, color is a deep red with multiple clots. Flush of prater attempted but was not successful. Prater removed and replaced with new coude-tipped catheter given difficult and traumatic insertion prior. 975mL clear yellow urine w/ initial red output post insertion of new prater.

## 2024-01-27 NOTE — PROGRESS NOTES
Patient complaining of increasingly severe heartburn, patient self reports history of GERD and heartburn, taking omeprazole daily at home. Provider notified, Maalox and daily omeprazole ordered. After Maalox given patient reports heartburn has greatly decreased.

## 2024-01-27 NOTE — PROGRESS NOTES
0700 - Report received from Junior ALCAZAR at patient's bedside. Patient resting in bed quietly with no complaints at this time. Telemetry monitor intact et functioning. Call light and belongings within reach, safety measures intact, white board updated.     0900 - Patient able to ambulate to bathroom with nursing staff.     1554 - Reported to monitor room that patient will be coming off tele as he is medical now.     1710 - Message sent to provider for patient request for enema. VORB for fleet enema, entered.     1900 - Reported off to Junior ALCAZAR

## 2024-01-27 NOTE — CARE PLAN
The patient is Stable - Low risk of patient condition declining or worsening    Shift Goals  Clinical Goals: decrease blood in urine, have a BM  Patient Goals: BM, decrease pain from prater  Family Goals: RADHA    Progress made toward(s) clinical / shift goals:  Pt's UO has lightened up, but is still pink tinged by end of shift. RN flushed prater multiple times to prevent blood clots from forming. After all ordered BM meds were given, pt had a small type 1 BM.    Patient is not progressing towards the following goals:

## 2024-01-27 NOTE — CARE PLAN
The patient is Stable - Low risk of patient condition declining or worsening    Shift Goals  Clinical Goals: Monitor Labs, have BM  Patient Goals: BM, pain control  Family Goals: na    Progress made toward(s) clinical / shift goals:  Please Refer to flowsheets and education for additional details    Problem: Knowledge Deficit - Standard  Goal: Patient and family/care givers will demonstrate understanding of plan of care, disease process/condition, diagnostic tests and medications  Outcome: Progressing     Problem: Respiratory  Goal: Patient will achieve/maintain optimum respiratory ventilation and gas exchange  Outcome: Progressing       Patient is not progressing towards the following goals:

## 2024-01-28 VITALS
DIASTOLIC BLOOD PRESSURE: 61 MMHG | HEIGHT: 72 IN | HEART RATE: 90 BPM | OXYGEN SATURATION: 92 % | WEIGHT: 227.96 LBS | BODY MASS INDEX: 30.88 KG/M2 | SYSTOLIC BLOOD PRESSURE: 102 MMHG | TEMPERATURE: 98.3 F | RESPIRATION RATE: 18 BRPM

## 2024-01-28 PROBLEM — K59.04 CHRONIC IDIOPATHIC CONSTIPATION: Chronic | Status: RESOLVED | Noted: 2024-01-25 | Resolved: 2024-01-28

## 2024-01-28 PROBLEM — E83.42 HYPOMAGNESEMIA: Status: RESOLVED | Noted: 2024-01-25 | Resolved: 2024-01-28

## 2024-01-28 PROBLEM — R33.9 URINARY RETENTION: Status: RESOLVED | Noted: 2024-01-25 | Resolved: 2024-01-28

## 2024-01-28 PROBLEM — E83.51: Status: RESOLVED | Noted: 2024-01-25 | Resolved: 2024-01-28

## 2024-01-28 PROBLEM — J96.01 ACUTE HYPOXEMIC RESPIRATORY FAILURE (HCC): Status: RESOLVED | Noted: 2024-01-25 | Resolved: 2024-01-28

## 2024-01-28 PROBLEM — R10.9 ABDOMINAL PAIN: Status: RESOLVED | Noted: 2024-01-27 | Resolved: 2024-01-28

## 2024-01-28 PROBLEM — Z71.89 ADVANCED CARE PLANNING/COUNSELING DISCUSSION: Status: RESOLVED | Noted: 2024-01-25 | Resolved: 2024-01-28

## 2024-01-28 PROBLEM — E87.6 HYPOKALEMIA: Status: RESOLVED | Noted: 2024-01-25 | Resolved: 2024-01-28

## 2024-01-28 PROBLEM — R65.10 SIRS (SYSTEMIC INFLAMMATORY RESPONSE SYNDROME) (HCC): Status: RESOLVED | Noted: 2024-01-25 | Resolved: 2024-01-28

## 2024-01-28 LAB
ANION GAP SERPL CALC-SCNC: 12 MMOL/L (ref 7–16)
BUN SERPL-MCNC: 21 MG/DL (ref 8–22)
CA-I SERPL-SCNC: 1.1 MMOL/L (ref 1.1–1.3)
CALCIUM SERPL-MCNC: 7.8 MG/DL (ref 8.5–10.5)
CHLORIDE SERPL-SCNC: 102 MMOL/L (ref 96–112)
CO2 SERPL-SCNC: 20 MMOL/L (ref 20–33)
CREAT SERPL-MCNC: 1.14 MG/DL (ref 0.5–1.4)
ERYTHROCYTE [DISTWIDTH] IN BLOOD BY AUTOMATED COUNT: 43.7 FL (ref 35.9–50)
GFR SERPLBLD CREATININE-BSD FMLA CKD-EPI: 68 ML/MIN/1.73 M 2
GLUCOSE SERPL-MCNC: 113 MG/DL (ref 65–99)
HCT VFR BLD AUTO: 34.4 % (ref 42–52)
HGB BLD-MCNC: 12 G/DL (ref 14–18)
MCH RBC QN AUTO: 32.3 PG (ref 27–33)
MCHC RBC AUTO-ENTMCNC: 34.9 G/DL (ref 32.3–36.5)
MCV RBC AUTO: 92.7 FL (ref 81.4–97.8)
NT-PROBNP SERPL IA-MCNC: 307 PG/ML (ref 0–125)
PLATELET # BLD AUTO: 150 K/UL (ref 164–446)
PMV BLD AUTO: 10.4 FL (ref 9–12.9)
POTASSIUM SERPL-SCNC: 4.3 MMOL/L (ref 3.6–5.5)
RBC # BLD AUTO: 3.71 M/UL (ref 4.7–6.1)
SODIUM SERPL-SCNC: 134 MMOL/L (ref 135–145)
WBC # BLD AUTO: 8.4 K/UL (ref 4.8–10.8)

## 2024-01-28 PROCEDURE — 80048 BASIC METABOLIC PNL TOTAL CA: CPT

## 2024-01-28 PROCEDURE — 97163 PT EVAL HIGH COMPLEX 45 MIN: CPT

## 2024-01-28 PROCEDURE — A9270 NON-COVERED ITEM OR SERVICE: HCPCS | Performed by: INTERNAL MEDICINE

## 2024-01-28 PROCEDURE — 700102 HCHG RX REV CODE 250 W/ 637 OVERRIDE(OP): Performed by: INTERNAL MEDICINE

## 2024-01-28 PROCEDURE — 97530 THERAPEUTIC ACTIVITIES: CPT

## 2024-01-28 PROCEDURE — 99239 HOSP IP/OBS DSCHRG MGMT >30: CPT | Performed by: HOSPITALIST

## 2024-01-28 PROCEDURE — A9270 NON-COVERED ITEM OR SERVICE: HCPCS

## 2024-01-28 PROCEDURE — 97535 SELF CARE MNGMENT TRAINING: CPT

## 2024-01-28 PROCEDURE — 700111 HCHG RX REV CODE 636 W/ 250 OVERRIDE (IP): Mod: JZ,JG | Performed by: HOSPITALIST

## 2024-01-28 PROCEDURE — 82330 ASSAY OF CALCIUM: CPT

## 2024-01-28 PROCEDURE — 700102 HCHG RX REV CODE 250 W/ 637 OVERRIDE(OP)

## 2024-01-28 PROCEDURE — 85027 COMPLETE CBC AUTOMATED: CPT

## 2024-01-28 PROCEDURE — 36415 COLL VENOUS BLD VENIPUNCTURE: CPT

## 2024-01-28 PROCEDURE — 700111 HCHG RX REV CODE 636 W/ 250 OVERRIDE (IP): Performed by: INTERNAL MEDICINE

## 2024-01-28 PROCEDURE — 83880 ASSAY OF NATRIURETIC PEPTIDE: CPT

## 2024-01-28 PROCEDURE — 97165 OT EVAL LOW COMPLEX 30 MIN: CPT

## 2024-01-28 RX ORDER — TAMSULOSIN HYDROCHLORIDE 0.4 MG/1
0.4 CAPSULE ORAL
Qty: 30 CAPSULE | Refills: 5 | Status: SHIPPED | OUTPATIENT
Start: 2024-01-29

## 2024-01-28 RX ORDER — CALCIUM GLUCONATE 20 MG/ML
2 INJECTION, SOLUTION INTRAVENOUS ONCE
Status: COMPLETED | OUTPATIENT
Start: 2024-01-28 | End: 2024-01-28

## 2024-01-28 RX ADMIN — CALCIUM GLUCONATE 2 G: 20 INJECTION, SOLUTION INTRAVENOUS at 12:48

## 2024-01-28 RX ADMIN — LACTULOSE 15 ML: 20 SOLUTION ORAL at 04:56

## 2024-01-28 RX ADMIN — OMEPRAZOLE 20 MG: 20 CAPSULE, DELAYED RELEASE ORAL at 04:56

## 2024-01-28 RX ADMIN — DOCUSATE SODIUM 50 MG AND SENNOSIDES 8.6 MG 2 TABLET: 8.6; 5 TABLET, FILM COATED ORAL at 04:56

## 2024-01-28 RX ADMIN — TAMSULOSIN HYDROCHLORIDE 0.4 MG: 0.4 CAPSULE ORAL at 08:41

## 2024-01-28 RX ADMIN — Medication 500 MG: at 12:25

## 2024-01-28 RX ADMIN — HEPARIN SODIUM 5000 UNITS: 5000 INJECTION, SOLUTION INTRAVENOUS; SUBCUTANEOUS at 04:56

## 2024-01-28 ASSESSMENT — COGNITIVE AND FUNCTIONAL STATUS - GENERAL
PERSONAL GROOMING: A LITTLE
SUGGESTED CMS G CODE MODIFIER MOBILITY: CK
STANDING UP FROM CHAIR USING ARMS: A LITTLE
HELP NEEDED FOR BATHING: A LITTLE
DAILY ACTIVITIY SCORE: 19
CLIMB 3 TO 5 STEPS WITH RAILING: A LITTLE
MOVING TO AND FROM BED TO CHAIR: A LITTLE
SUGGESTED CMS G CODE MODIFIER DAILY ACTIVITY: CK
TOILETING: A LITTLE
DRESSING REGULAR UPPER BODY CLOTHING: A LITTLE
MOVING FROM LYING ON BACK TO SITTING ON SIDE OF FLAT BED: A LITTLE
DRESSING REGULAR LOWER BODY CLOTHING: A LITTLE
WALKING IN HOSPITAL ROOM: A LITTLE
MOBILITY SCORE: 19

## 2024-01-28 ASSESSMENT — FIBROSIS 4 INDEX: FIB4 SCORE: 1.46

## 2024-01-28 ASSESSMENT — GAIT ASSESSMENTS
DISTANCE (FEET): 75
ASSISTIVE DEVICE: FRONT WHEEL WALKER
GAIT LEVEL OF ASSIST: STANDBY ASSIST
DEVIATION: BRADYKINETIC;INCREASED BASE OF SUPPORT;OTHER (COMMENT)

## 2024-01-28 ASSESSMENT — ACTIVITIES OF DAILY LIVING (ADL): TOILETING: INDEPENDENT

## 2024-01-28 ASSESSMENT — PAIN DESCRIPTION - PAIN TYPE: TYPE: ACUTE PAIN

## 2024-01-28 NOTE — CARE PLAN
The patient is Stable - Low risk of patient condition declining or worsening    Shift Goals  Clinical Goals: Monitor labs, possible DC home  Patient Goals: Sleep  Family Goals: na    Progress made toward(s) clinical / shift goals:  Mccabe wei, may DC home today       Problem: Pain - Standard  Goal: Alleviation of pain or a reduction in pain to the patient’s comfort goal  1/28/2024 1102 by Flor Garcia RADRYAN  Outcome: Progressing  1/28/2024 1102 by Flor Garcia R.N.  Outcome: Progressing     Problem: Knowledge Deficit - Standard  Goal: Patient and family/care givers will demonstrate understanding of plan of care, disease process/condition, diagnostic tests and medications  Outcome: Progressing     Problem: Hemodynamics  Goal: Patient's hemodynamics, fluid balance and neurologic status will be stable or improve  Outcome: Progressing     Problem: Fluid Volume  Goal: Fluid volume balance will be maintained  Outcome: Progressing     Problem: Urinary - Renal Perfusion  Goal: Ability to achieve and maintain adequate renal perfusion and functioning will improve  Outcome: Progressing     Problem: Respiratory  Goal: Patient will achieve/maintain optimum respiratory ventilation and gas exchange  Outcome: Progressing     Problem: Physical Regulation  Goal: Diagnostic test results will improve  Outcome: Progressing  Goal: Signs and symptoms of infection will decrease  Outcome: Progressing     Problem: Fall Risk  Goal: Patient will remain free from falls  Outcome: Progressing     Problem: Skin Integrity  Goal: Skin integrity is maintained or improved  Outcome: Progressing     Problem: Urinary Elimination  Goal: Establish and maintain regular urinary output  Outcome: Progressing       Patient is not progressing towards the following goals:

## 2024-01-28 NOTE — CARE PLAN
The patient is Stable - Low risk of patient condition declining or worsening    Shift Goals  Clinical Goals: Monitor Labs, Empty bowels  Patient Goals: Enema, sleep, get stronger  Family Goals: na    Progress made toward(s) clinical / shift goals:    Problem: Pain - Standard  Goal: Alleviation of pain or a reduction in pain to the patient’s comfort goal  Outcome: Progressing     Problem: Knowledge Deficit - Standard  Goal: Patient and family/care givers will demonstrate understanding of plan of care, disease process/condition, diagnostic tests and medications  Outcome: Progressing     Problem: Hemodynamics  Goal: Patient's hemodynamics, fluid balance and neurologic status will be stable or improve  Outcome: Progressing     Problem: Physical Regulation  Goal: Signs and symptoms of infection will decrease  Outcome: Progressing       Patient is not progressing towards the following goals:

## 2024-01-28 NOTE — PROGRESS NOTES
0679 - Report received from Junior ALCAZAR at patient's bedside. Patient resting in bed quietly with no complaints at this time. Call light and belongings within reach, safety measures intact, white board updated.     1045 - Mccabe removed as ordered.     1445 - Patient able to void 200 mls clear yellow urine. PT eval completed, patient okay to go home with walker and home health. Patient agreeable to home health and discharge. Reported to provider. [facility] request.     1600 - IV removed with immediate hemostasis.     1700 - All belongings packed to be sent home with patient. Patient taken via wheelchair to private vehicle for DC home.

## 2024-01-28 NOTE — THERAPY
Occupational Therapy   Initial Evaluation     Patient Name: Fausto Martin  Age:  73 y.o., Sex:  male  Medical Record #: [id number]  Today's Date: 1/28/2024     Precautions  Precautions: Fall Risk    Assessment    Patient is 73 y.o. male admitted with abdominal pain. Other pertinent medical history includes lumbar radiculopathy, HTN, dyslipidemia, chronic constipation, and questionable irritable bowel syndrome. Pt seen for OT evaluation and treatment. Pt adjusted socks with SBA, stood to wash hands with CGA, performed toilet hygiene w/ CGA, and performed toilet transfer w/ CGA. Pt lives alone but reported he has friends who can help him if needed. Pt educated regarding the role of OT, energy conservation principles, fall prevention strategies, and recommendation for tub transfer bench. Pt current functional performance limited by impaired activity tolerance, impaired balance, and generalized weakness. Pt will benefit from skilled OT while admitted to acute care.     Plan    Occupational Therapy Initial Treatment Plan   Treatment Interventions: Self Care / Activities of Daily Living, Adaptive Equipment, Neuro Re-Education / Balance, Therapeutic Exercises, Therapeutic Activity  Treatment Frequency: 3 Times per Week  Duration: Until Therapy Goals Met    DC Equipment Recommendations: Tub Transfer Bench  Discharge Recommendations: Recommend home health for continued occupational therapy services      Objective     01/28/24 1443   Prior Living Situation   Prior Services Meals on Wheels   Housing / Facility 1 Story Apartment / Condo   Steps Into Home 0   Steps In Home 0   Bathroom Set up Bathtub / Shower Combination   Equipment Owned Quad Cane   Lives with - Patient's Self Care Capacity Alone and Able to Care For Self   Comments Pt reported he has friends in the area who can assist if needed.   Prior Level of ADL Function   Self Feeding Independent   Grooming / Hygiene Independent   Bathing Independent   Dressing  Independent   Toileting Independent   Prior Level of IADL Function   Medication Management Independent   Laundry Independent   Finances Independent   Home Management Independent   Shopping Requires Assist   Prior Level Of Mobility Independent Without Device in Community;Independent Without Device in Home   Occupation (Pre-Hospital Vocational) Retired Due To Age   History of Falls   History of Falls No   Precautions   Precautions Fall Risk   Vitals   Pulse 90   Blood Pressure  128/79   Pulse Oximetry 95 %   O2 Delivery Device None - Room Air   Pain   Pain Scales 0 to 10 Scale    Pain 0 - 10 Group   Therapist Pain Assessment Post Activity Pain Same as Prior to Activity;Nurse Notified  (not rated, agreeable to eval)   Cognition    Cognition / Consciousness WDL   Level of Consciousness Alert   Comments Pleasant, cooperative, receptive to education   Passive ROM Upper Body   Passive ROM Upper Body WDL   Active ROM Upper Body   Active ROM Upper Body  WDL   Strength Upper Body   Upper Body Strength  WDL   Upper Body Muscle Tone   Upper Body Muscle Tone  WDL   Coordination Upper Body   Comments WFL from observation   Balance Assessment   Sitting Balance (Static) Fair +   Sitting Balance (Dynamic) Fair   Standing Balance (Static) Fair   Standing Balance (Dynamic) Fair -   Weight Shift Sitting Fair   Weight Shift Standing Fair   Comments w/ FWW, cues for safety with FWW   Bed Mobility    Supine to Sit Standby Assist   Sit to Supine   (up to chair post)   Scooting Standby Assist   Rolling Standby Assist   Comments HOB flat, minimal use of rails   ADL Assessment   Grooming Contact Guard Assist;Standing  (washed hands at sink)   Lower Body Dressing Supervision  (adjust socks)   Toileting Contact Guard Assist  (stood to urinate)   Functional Mobility   Sit to Stand Standby Assist   Bed, Chair, Wheelchair Transfer Contact Guard Assist   Toilet Transfers Contact Guard Assist   Transfer Method Stand Step   Mobility  EOB>Bathroom>EOB>chair   Comments w/ FWW   Visual Perception   Visual Perception  Not Tested   Activity Tolerance   Sitting in Chair up to chair post   Sitting Edge of Bed >5 min   Standing >10 min   Comments limited by weakness   Patient / Family Goals   Patient / Family Goal #1 to go home   Short Term Goals   Short Term Goal # 1 Pt will perform standing g/h w/ supv   Short Term Goal # 2 Pt will perform ADL transfer w/ supv   Short Term Goal # 3 Pt will perform LB dressing w/ supv   Education Group   Education Provided Role of Occupational Therapist;Activities of Daily Living;Home Safety;Energy Conservation   Role of Occupational Therapist Patient Response Patient;Acceptance;Explanation;Verbal Demonstration   Energy Conservation Patient Response Patient;Acceptance;Explanation;Verbal Demonstration;Handout   Home Safety Patient Response Patient;Acceptance;Explanation;Verbal Demonstration   ADL Patient Response Patient;Acceptance;Explanation;Demonstration;Verbal Demonstration;Action Demonstration   Adaptive Equipment Patient Response Patient;Acceptance;Explanation;Demonstration;Verbal Demonstration;Action Demonstration   Occupational Therapy Initial Treatment Plan    Treatment Interventions Self Care / Activities of Daily Living;Adaptive Equipment;Neuro Re-Education / Balance;Therapeutic Exercises;Therapeutic Activity   Treatment Frequency 3 Times per Week   Duration Until Therapy Goals Met   Problem List   Problem List Decreased Active Daily Living Skills;Decreased Homemaking Skills;Decreased Functional Mobility;Decreased Activity Tolerance;Impaired Postural Control / Balance

## 2024-01-28 NOTE — PROGRESS NOTES
Report received from outgoing nurse, care transferred at 1900. Patient currently medical and A&Ox 4. Patient had large soft/watery BM with dark black/brown stool. Whiteboard updated with plan for the day and names of staff. No other questions or concerns at this time from the patient. Call light within reach, fall precautions in place.

## 2024-01-28 NOTE — DISCHARGE INSTRUCTIONS
Discharge Instructions per Antoine Fatima D.O.        DIET: healthy balanced diet    ACTIVITY: as tolerates    DIAGNOSIS: abdominal pain with constipation and electrolyte depletion.    Return to ER if needed

## 2024-01-28 NOTE — DISCHARGE SUMMARY
Discharge Summary    CHIEF COMPLAINT ON ADMISSION  Chief Complaint   Patient presents with    Abdominal Pain     Pt BIB EMS for generalized abdominal pain x1 year, with hx of laxative abuse x2 years. Pt reports last BM 4 days ago, denies any blood in stool. C/o SOB, and new onset tremors.          Reason for Admission  EMS     Admission Date  1/25/2024    CODE STATUS  Full Code    HPI & HOSPITAL COURSE  Fausto Martin is a 73 y.o. male with a history of lumbar radiculopathy, hypertension, dyslipidemia, chronic constipation, questionable irritable bowel on Linzess. He was admitted 1/25/2024 with complaint of ongoing abdominal pain with concern of acute on chronic discomfort. Patient states over the past year he has been having ongoing abdominal pain. Complains of last bowel movement being 4 days prior to presentation. Now he presented with constipation, shortness of breath and tremors. Workup in the emergency room showed significant electrolyte discrepancies with a potassium of 2.8, calcium 5.6, with a corrected calcium of 6.7, and a magnesium of 1.1. His lactic acid was within normal limits. CT of the abdomen and pelvis showed concern of of changes for cirrhosis, 3.2 cm fusiform infrarenal abdominal aortic aneurysm, fat-containing right inguinal hernia and diverticulosis. An abdominal x-ray suggested constipation.     He had a prater placed during admit and had some traumatic insertion suspected but hematuria resolved and was placed on tamsulosin.  He was urinating on his own prior to discharge.  He had replacement of his electrolytes.  He was weak but refused a skilled care facility.  Therapy evaluated him and felt a walker and home health would be fine.    Therefore, he is discharged in good and stable condition to home with close outpatient follow-up.    The patient met 2-midnight criteria for an inpatient stay at the time of discharge.    Discharge Date  1/28/2024    FOLLOW UP ITEMS POST  DISCHARGE  None    DISCHARGE DIAGNOSES  Principal Problem (Resolved):    Idiopathic hypocalcemia (POA: Yes)  Active Problems:    Diverticulosis (POA: Yes)    Right Inguinal hernia without obstruction or gangrene (POA: Yes)    Infrarenal abdominal aortic aneurysm (AAA) without rupture (HCC) (Chronic) (POA: Yes)    History of drug abuse (HCC) (Chronic) (POA: Yes)    History of tobacco abuse (Chronic) (POA: Yes)    Mild tetrahydrocannabinol (THC) abuse (POA: Yes)  Resolved Problems:    Chronic idiopathic constipation (Chronic) (POA: Yes)    Hypokalemia (POA: Yes)    SIRS (systemic inflammatory response syndrome) (HCC) (POA: Yes)    Urinary retention (POA: Yes)    Acute hypoxemic respiratory failure (HCC) (POA: Yes)    Hypomagnesemia (POA: Yes)    Advanced care planning/counseling discussion (POA: Yes)    Abdominal pain (POA: Yes)      FOLLOW UP  No future appointments.  [facility] ([facility]) - Primary Care and Family Medicine  [address] [place] [id number]  [phone]  Schedule an appointment as soon as possible for a visit in 1 week(s)  for follow up of your electrolyte abnormalities, urinary retention, and constipation      MEDICATIONS ON DISCHARGE     Medication List        START taking these medications        Instructions   tamsulosin 0.4 MG capsule  Start taking on: January 29, 2024  Commonly known as: Flomax   Take 1 Capsule by mouth 1/2 hour after breakfast.  Dose: 0.4 mg            CONTINUE taking these medications        Instructions   ACETAMIN PO   Take 3 Tablets by mouth 1 time a day as needed (mild pain).  Dose: 3 Tablet     amLODIPine 5 MG Tabs  Commonly known as: Norvasc   Take 5 mg by mouth every day.  Dose: 5 mg     atorvastatin 20 MG Tabs  Commonly known as: Lipitor   Take 20 mg by mouth every evening.  Dose: 20 mg     gabapentin 100 MG Caps  Commonly known as: Neurontin   Take 100 mg by mouth 3 times a day as needed (pain).  Dose: 100 mg     Linzess 145 MCG Caps  Generic  drug: linaCLOtide   Take 1 Capsule by mouth every morning.  Dose: 1 Capsule     lisinopril 40 MG tablet  Commonly known as: Prinivil   Take 40 mg by mouth every day.  Dose: 40 mg              Allergies  No Known Allergies    DIET  Orders Placed This Encounter   Procedures    Diet Order Diet: Cardiac     Standing Status:   Standing     Number of Occurrences:   1     Order Specific Question:   Diet:     Answer:   Cardiac [6]       ACTIVITY  As tolerated.  Weight bearing as tolerated    CONSULTATIONS  none    PROCEDURES  none    LABORATORY  Lab Results   Component Value Date    SODIUM 134 (L) 01/28/2024    POTASSIUM 4.3 01/28/2024    CHLORIDE 102 01/28/2024    CO2 20 01/28/2024    GLUCOSE 113 (H) 01/28/2024    BUN 21 01/28/2024    CREATININE 1.14 01/28/2024        Lab Results   Component Value Date    WBC 8.4 01/28/2024    HEMOGLOBIN 12.0 (L) 01/28/2024    HEMATOCRIT 34.4 (L) 01/28/2024    PLATELETCT 150 (L) 01/28/2024      CT-ABDOMEN-PELVIS WITH   Final Result         1.  Irregular hepatic contour favoring changes of cirrhosis   2.  3.2 cm fusiform infrarenal abdominal aortic aneurysm, radiographic follow-up and surveillance recommended as clinically appropriate   3.  Fat-containing right inguinal hernia   4.  Diverticulosis   5.  Atherosclerosis      [id number] VIEW   Final Result         1.  Moderate stool in the colon suggests changes of constipation, otherwise nonspecific bowel gas pattern in the upper abdomen      DX-CHEST-PORTABLE (1 VIEW)   Final Result         1.  Hazy left lung base opacity suggesting subtle infiltrate   2.  Atherosclerosis            Total time of the discharge process exceeds 33 minutes.

## 2024-01-28 NOTE — DISCHARGE PLANNING
LMSW spoke with pt to receive choice for HH and DME. HH choices were [facility]. [facility] 2. [facility] 3. [facility]. DME choice [facility]. Faxed choice forms to [facility].     1552 CM received IMM. Faxed to [facility].

## 2024-01-29 ENCOUNTER — PATIENT OUTREACH (OUTPATIENT)
Dept: MEDICAL GROUP | Facility: IMAGING CENTER | Age: 74
End: 2024-01-29
Payer: MEDICARE

## 2024-01-29 ENCOUNTER — HOSPITAL ENCOUNTER (EMERGENCY)
Facility: MEDICAL CENTER | Age: 74
End: 2024-01-29
Attending: EMERGENCY MEDICINE
Payer: MEDICARE

## 2024-01-29 ENCOUNTER — APPOINTMENT (OUTPATIENT)
Dept: URGENT CARE | Facility: CLINIC | Age: 74
End: 2024-01-29
Payer: MEDICARE

## 2024-01-29 VITALS
BODY MASS INDEX: 30.76 KG/M2 | RESPIRATION RATE: 15 BRPM | OXYGEN SATURATION: 95 % | TEMPERATURE: 98.1 F | WEIGHT: 227.07 LBS | SYSTOLIC BLOOD PRESSURE: 125 MMHG | HEIGHT: 72 IN | DIASTOLIC BLOOD PRESSURE: 73 MMHG | HEART RATE: 72 BPM

## 2024-01-29 DIAGNOSIS — R33.9 URINARY RETENTION: ICD-10-CM

## 2024-01-29 LAB
ALBUMIN SERPL BCP-MCNC: 2.8 G/DL (ref 3.2–4.9)
ALBUMIN/GLOB SERPL: 0.7 G/DL
ALP SERPL-CCNC: 71 U/L (ref 30–99)
ALT SERPL-CCNC: 29 U/L (ref 2–50)
ANION GAP SERPL CALC-SCNC: 13 MMOL/L (ref 7–16)
APPEARANCE UR: CLEAR
AST SERPL-CCNC: 35 U/L (ref 12–45)
BACTERIA #/AREA URNS HPF: NEGATIVE /HPF
BASOPHILS # BLD AUTO: 0.4 % (ref 0–1.8)
BASOPHILS # BLD: 0.04 K/UL (ref 0–0.12)
BILIRUB SERPL-MCNC: 1.8 MG/DL (ref 0.1–1.5)
BILIRUB UR QL STRIP.AUTO: NEGATIVE
BUN SERPL-MCNC: 22 MG/DL (ref 8–22)
CALCIUM ALBUM COR SERPL-MCNC: 9.5 MG/DL (ref 8.5–10.5)
CALCIUM SERPL-MCNC: 8.5 MG/DL (ref 8.5–10.5)
CHLORIDE SERPL-SCNC: 102 MMOL/L (ref 96–112)
CO2 SERPL-SCNC: 23 MMOL/L (ref 20–33)
COLOR UR: YELLOW
CREAT SERPL-MCNC: 1.33 MG/DL (ref 0.5–1.4)
EOSINOPHIL # BLD AUTO: 0.19 K/UL (ref 0–0.51)
EOSINOPHIL NFR BLD: 2 % (ref 0–6.9)
EPI CELLS #/AREA URNS HPF: NEGATIVE /HPF
ERYTHROCYTE [DISTWIDTH] IN BLOOD BY AUTOMATED COUNT: 42.7 FL (ref 35.9–50)
GFR SERPLBLD CREATININE-BSD FMLA CKD-EPI: 56 ML/MIN/1.73 M 2
GLOBULIN SER CALC-MCNC: 3.8 G/DL (ref 1.9–3.5)
GLUCOSE SERPL-MCNC: 116 MG/DL (ref 65–99)
GLUCOSE UR STRIP.AUTO-MCNC: NEGATIVE MG/DL
HCT VFR BLD AUTO: 37.1 % (ref 42–52)
HGB BLD-MCNC: 13.3 G/DL (ref 14–18)
HYALINE CASTS #/AREA URNS LPF: ABNORMAL /LPF
IMM GRANULOCYTES # BLD AUTO: 0.02 K/UL (ref 0–0.11)
IMM GRANULOCYTES NFR BLD AUTO: 0.2 % (ref 0–0.9)
KETONES UR STRIP.AUTO-MCNC: NEGATIVE MG/DL
LEUKOCYTE ESTERASE UR QL STRIP.AUTO: NEGATIVE
LYMPHOCYTES # BLD AUTO: 0.86 K/UL (ref 1–4.8)
LYMPHOCYTES NFR BLD: 9.2 % (ref 22–41)
MCH RBC QN AUTO: 33 PG (ref 27–33)
MCHC RBC AUTO-ENTMCNC: 35.8 G/DL (ref 32.3–36.5)
MCV RBC AUTO: 92.1 FL (ref 81.4–97.8)
MICRO URNS: ABNORMAL
MONOCYTES # BLD AUTO: 1.01 K/UL (ref 0–0.85)
MONOCYTES NFR BLD AUTO: 10.8 % (ref 0–13.4)
NEUTROPHILS # BLD AUTO: 7.27 K/UL (ref 1.82–7.42)
NEUTROPHILS NFR BLD: 77.4 % (ref 44–72)
NITRITE UR QL STRIP.AUTO: NEGATIVE
NRBC # BLD AUTO: 0 K/UL
NRBC BLD-RTO: 0 /100 WBC (ref 0–0.2)
PH UR STRIP.AUTO: 7.5 [PH] (ref 5–8)
PLATELET # BLD AUTO: 201 K/UL (ref 164–446)
PMV BLD AUTO: 10.1 FL (ref 9–12.9)
POTASSIUM SERPL-SCNC: 4.1 MMOL/L (ref 3.6–5.5)
PROT SERPL-MCNC: 6.6 G/DL (ref 6–8.2)
PROT UR QL STRIP: NEGATIVE MG/DL
RBC # BLD AUTO: 4.03 M/UL (ref 4.7–6.1)
RBC # URNS HPF: ABNORMAL /HPF
RBC UR QL AUTO: ABNORMAL
SODIUM SERPL-SCNC: 138 MMOL/L (ref 135–145)
SP GR UR STRIP.AUTO: 1.01
UROBILINOGEN UR STRIP.AUTO-MCNC: 0.2 MG/DL
WBC # BLD AUTO: 9.4 K/UL (ref 4.8–10.8)
WBC #/AREA URNS HPF: ABNORMAL /HPF

## 2024-01-29 PROCEDURE — 80053 COMPREHEN METABOLIC PANEL: CPT

## 2024-01-29 PROCEDURE — 81001 URINALYSIS AUTO W/SCOPE: CPT

## 2024-01-29 PROCEDURE — 36415 COLL VENOUS BLD VENIPUNCTURE: CPT

## 2024-01-29 PROCEDURE — 51702 INSERT TEMP BLADDER CATH: CPT

## 2024-01-29 PROCEDURE — A9270 NON-COVERED ITEM OR SERVICE: HCPCS | Mod: UD | Performed by: EMERGENCY MEDICINE

## 2024-01-29 PROCEDURE — 85025 COMPLETE CBC W/AUTO DIFF WBC: CPT

## 2024-01-29 PROCEDURE — 303105 HCHG CATHETER EXTRA

## 2024-01-29 PROCEDURE — 700102 HCHG RX REV CODE 250 W/ 637 OVERRIDE(OP): Mod: UD | Performed by: EMERGENCY MEDICINE

## 2024-01-29 PROCEDURE — 51798 US URINE CAPACITY MEASURE: CPT

## 2024-01-29 PROCEDURE — 99285 EMERGENCY DEPT VISIT HI MDM: CPT

## 2024-01-29 RX ORDER — HYDROCODONE BITARTRATE AND ACETAMINOPHEN 5; 325 MG/1; MG/1
1 TABLET ORAL ONCE
Status: COMPLETED | OUTPATIENT
Start: 2024-01-29 | End: 2024-01-29

## 2024-01-29 RX ADMIN — HYDROCODONE BITARTRATE AND ACETAMINOPHEN 1 TABLET: 5; 325 TABLET ORAL at 18:22

## 2024-01-29 ASSESSMENT — FIBROSIS 4 INDEX: FIB4 SCORE: 1.46

## 2024-01-29 ASSESSMENT — PAIN DESCRIPTION - PAIN TYPE: TYPE: ACUTE PAIN

## 2024-01-29 NOTE — CARE PLAN
The patient is Stable - Low risk of patient condition declining or worsening    Shift Goals  Clinical Goals: Monitor labs, possible DC home  Patient Goals: Sleep  Family Goals: na    Progress made toward(s) clinical / shift goals:  DC home       Problem: Pain - Standard  Goal: Alleviation of pain or a reduction in pain to the patient’s comfort goal  1/28/2024 1602 by Flor Garcia R.N.  Outcome: Met  1/28/2024 1102 by Flor Garcia R.N.  Outcome: Progressing     Problem: Knowledge Deficit - Standard  Goal: Patient and family/care givers will demonstrate understanding of plan of care, disease process/condition, diagnostic tests and medications  1/28/2024 1602 by Flor Garcia R.N.  Outcome: Met  1/28/2024 1102 by Flor Garcia R.N.  Outcome: Progressing     Problem: Hemodynamics  Goal: Patient's hemodynamics, fluid balance and neurologic status will be stable or improve  1/28/2024 1602 by Flor Garcia R.N.  Outcome: Met  1/28/2024 1102 by Flor Garcia R.N.  Outcome: Progressing     Problem: Fluid Volume  Goal: Fluid volume balance will be maintained  1/28/2024 1602 by Flor Garcia R.N.  Outcome: Met  1/28/2024 1102 by Flor Garcia R.N.  Outcome: Progressing     Problem: Urinary - Renal Perfusion  Goal: Ability to achieve and maintain adequate renal perfusion and functioning will improve  1/28/2024 1602 by Flor Garcia R.N.  Outcome: Met  1/28/2024 1102 by Flor Garcia R.N.  Outcome: Progressing     Problem: Respiratory  Goal: Patient will achieve/maintain optimum respiratory ventilation and gas exchange  1/28/2024 1602 by Flor Garcia R.N.  Outcome: Met  1/28/2024 1102 by Flor Garcia R.N.  Outcome: Progressing     Problem: Physical Regulation  Goal: Diagnostic test results will improve  1/28/2024 1602 by Flor Garcia R.N.  Outcome: Met  1/28/2024 1102 by Flor Garcia R.N.  Outcome: Progressing  Goal: Signs and symptoms of infection will decrease  1/28/2024 1602 by  Flor Garcia R.N.  Outcome: Met  1/28/2024 1102 by Flor Garcia R.N.  Outcome: Progressing     Problem: Fall Risk  Goal: Patient will remain free from falls  1/28/2024 1602 by Flor Garcia R.N.  Outcome: Met  1/28/2024 1102 by Flor Garcia R.N.  Outcome: Progressing     Problem: Skin Integrity  Goal: Skin integrity is maintained or improved  1/28/2024 1602 by Flor Garcia R.N.  Outcome: Met  1/28/2024 1102 by Flor Garcia R.N.  Outcome: Progressing     Problem: Urinary Elimination  Goal: Establish and maintain regular urinary output  1/28/2024 1602 by Flor Garcia R.N.  Outcome: Met  1/28/2024 1102 by Flor Garcia R.N.  Outcome: Progressing       Patient is not progressing towards the following goals:

## 2024-01-29 NOTE — THERAPY
Physical Therapy   Initial Evaluation     Patient Name: Fausto Martin  Age:  73 y.o., Sex:  male  Medical Record #: [id number]  Today's Date: 1/28/2024     Precautions  Precautions: Fall Risk    Assessment  Patient is 73 y.o. male  with complaint of ongoing abdominal pain with concern of acute on chronic discomfort. Dx'd with Idiopathic hypocalcemia, Hypomagnesemia, hypoxia with minor emphysema, AAA, urinary retention, diverticulosis, SIRS, hypokalemia, Chronic idiopathic constipation. History of lumbar radiculopathy, hypertension, dyslipidemia, chronic constipation, questionable irritable bowel.    Pt with kyphosis due to chronic lumbar back pain. Pt able to amb with FWW slowly, min flexed knees, no LOB with SBA. Pt will continue to benefit from acute physical therapy to assist towards established goals. Anticipate pt will benefit from home with home health upon DC from hospital.         Plan    Physical Therapy Initial Treatment Plan   Treatment Plan : Bed Mobility, Gait Training, Neuro Re-Education / Balance, Therapeutic Activities, Therapeutic Exercise, Equipment  Treatment Frequency: 4 Times per Week  Duration: Until Therapy Goals Met    DC Equipment Recommendations: Front-Wheel Walker (FWW order inintiated)  Discharge Recommendations: Recommend home health for continued physical therapy services       Subjective    Pt resting in bed, agreed to PT/OT.      Objective       01/28/24 1442      Precautions   Precautions Fall Risk   Pain 0 - 10 Group   Location Back  (chronic)   Therapist Pain Assessment Post Activity Pain Same as Prior to Activity   Prior Living Situation   Prior Services Meals on Wheels   Housing / Facility 1 Story Apartment / Condo   Steps Into Home 0   Steps In Home 0   Equipment Owned Quad Cane   Lives with - Patient's Self Care Capacity Alone and Able to Care For Self   Comments friends in the area can assist   Prior Level of Functional Mobility   Ambulation Independent   Ambulation  Distance community   Assistive Devices Used None   History of Falls   History of Falls No   Cognition    Cognition / Consciousness WDL   Level of Consciousness Alert   Comments pleasant, cooperative, flat affect, receptive to education   Active ROM Lower Body    Active ROM Lower Body  WDL   Strength Lower Body   Lower Body Strength  WDL   Coordination Lower Body    Coordination Lower Body  WDL   Vision   Vision Comments wears glasses   Balance Assessment   Sitting Balance (Static) Fair +   Sitting Balance (Dynamic) Fair   Standing Balance (Static) Fair   Standing Balance (Dynamic) Fair -   Weight Shift Sitting Good   Weight Shift Standing Good   Comments standing with FWW   Bed Mobility    Supine to Sit Standby Assist   Scooting Standby Assist   Rolling Standby Assist   Comments HOB flat, no bed rails, log roll techinque   Gait Analysis   Gait Level Of Assist Standby Assist   Assistive Device Front Wheel Walker   Distance (Feet) 75   # of Times Distance was Traveled 2   Deviation Bradykinetic;Increased Base Of Support;Other (Comment)  (min knee flexion, forward trunk flexion)   Functional Mobility   Sit to Stand Standby Assist   Bed, Chair, Wheelchair Transfer Standby Assist   Transfer Method Stand Step   Comments VCs for use of FWW with good return demonstration   How much difficulty does the patient currently have...   Turning over in bed (including adjusting bedclothes, sheets and blankets)? 4   Sitting down on and standing up from a chair with arms (e.g., wheelchair, bedside commode, etc.) 3   Moving from lying on back to sitting on the side of the bed? 3   How much help from another person does the patient currently need...   Moving to and from a bed to a chair (including a wheelchair)? 3   Need to walk in a hospital room? 3   Climbing 3-5 steps with a railing? 3   6 clicks Mobility Score 19   Activity Tolerance   Sitting in Chair up in chair after eval   Sitting Edge of Bed >5 minutes   Standing >10 minutes  total   Comments limited by weakness and min fatigue   Patient / Family Goals    Patient / Family Goal #1 return home   Short Term Goals    Short Term Goal # 1 supine<>sit supervised in 6 visits to progress bed mobility   Short Term Goal # 2 sit<>stand with FWW supervised in 6 visits to progress transfers   Short Term Goal # 3 amb 150ft with FWW supervised in 6 visits to progress with functional distances   Education Group   Education Provided Role of Physical Therapist;Gait Training;Use of Assistive Device   Role of Physical Therapist Patient Response Patient;Acceptance;Explanation;Verbal Demonstration   Gait Training Patient Response Patient;Acceptance;Explanation;Demonstration;Teach Back;Action Demonstration   Use of Assistive Device Patient Response Patient;Acceptance;Explanation;Demonstration;Teach Back;Action Demonstration   Physical Therapy Initial Treatment Plan    Treatment Plan  Bed Mobility;Gait Training;Neuro Re-Education / Balance;Therapeutic Activities;Therapeutic Exercise;Equipment   Treatment Frequency 4 Times per Week   Duration Until Therapy Goals Met   Problem List    Problems Pain;Impaired Bed Mobility;Impaired Transfers;Impaired Ambulation;Impaired Balance;Decreased Activity Tolerance   Anticipated Discharge Equipment and Recommendations   DC Equipment Recommendations Front-Wheel Walker  (FWW order inintiated)   Discharge Recommendations Recommend home health for continued physical therapy services   Interdisciplinary Plan of Care Collaboration   IDT Collaboration with  Nursing;Occupational Therapist  Patient benefited from co-treatment with Occupational Therapist for the following reason(s):  Therapy sessions needed to be done by a certain time period for both disciplines.     Patient Position at End of Therapy Seated;Chair Alarm On;Call Light within Reach;Tray Table within Reach;Phone within Reach   Collaboration Comments RN updated   Session Information   Date / Session Number  1/28 1(1/4,  2/3)

## 2024-01-29 NOTE — PROGRESS NOTES
"1/29: 1st Patient outreach for TCM.  [facility] with contact information.    1/29 1533: spoke with pt for tcm. Pt is not using [facility] for pcp services anymore, and is instead seeing Dr. Jane with [facility]. Has fv on 2/1/24 @ 1:50pm per pt. Pt expressed concern that he has only been able to void 3tbs since discharge yesterday. Pt states he is taking the prescribed flomax. He states he \"thinks\" his urine was a \"very dark yellow\" color, that he feels strong urgency to void, however is unable and it is painful to try. Pt states that his bladder has become visibly \"very distended\" since he left the hospital and is worried that he is retaining urine. Pt notes he was voiding independently on hospital discharge. Pt was advised to seek ER for re-evaluation. Pt agreeable to this, will seek care today, and will contact office or PCP with [facility] if any other questions, concerns, or needs arise.     Transitional Care Management  TCM Outreach Date and Time: Filed (1/29/2024  8:12 AM)    Discharge Questions  Actual Discharge Date: 01/28/24  Now that you are home, how are you feeling?: Fair (pt states he has only voided 3 tablespoons of dark yellow urine since DC on 1/28. taking flomax, feels strong urgency to void, but unable, states painful to void and his bladder is \"very distended.\" er/uc advised. pt no longer [facility], sees [facility] now)  Did you receive any new prescriptions?: Yes  Were you able to get them filled?: Yes  Meds to Bed or Pharmacy filled?: Pharmacy  Do you have any questions about your current medications or new medications (Review Med Rec)?: No  Did you have any durable medical equipment ordered?: Yes  Did you receive it?: Yes (FWW)  Do you have a follow up appointment scheduled with your PCP?: Yes (with [facility], Dr. Jane, on 2/1 @1:50)  Appointment Date:  (pt states he uses [facility] for pcp services.)  Any issues or paperwork you wish to discuss with your PCP?: No  Are you (patient) able to get to the appointment?: Yes  If Home Health " was ordered, have they contacted you (Patient): Yes  Did you have enough support after your last discharge?: Yes  Does this patient qualify for the CCM program?: No    Transitional Care  Number of attempts made to contact patient: 1  Current or previous attempts competed within two business days of discharge? : Yes  Provided education regarding treatment plan, medications, self-management, ADLs?: Yes  Has patient completed an Advanced Directive?: No  Has the Care Manager's phone number provided?: No  Is there anything else I can help you with?: No    Discharge Summary  Chief Complaint: Abdominal Pain - Pt BIB EMS for generalized abdominal pain x1 year, with hx of laxative abuse x2 years. Pt reports last BM 4 days ago, denies any blood in stool. C/o SOB, and new onset tremors.  Admitting Diagnosis: EMS  Discharge Diagnosis: idiopathic hypocalcemia

## 2024-01-30 LAB
BACTERIA BLD CULT: NORMAL
BACTERIA BLD CULT: NORMAL
SIGNIFICANT IND 70042: NORMAL
SIGNIFICANT IND 70042: NORMAL
SITE SITE: NORMAL
SITE SITE: NORMAL
SOURCE SOURCE: NORMAL
SOURCE SOURCE: NORMAL

## 2024-01-30 NOTE — ED NOTES
Bedside report received from off going RN: Franny , assumed care of patient.  POC discussed with patient. Call light within reach, all needs addressed at this time.       Fall risk interventions in place: Move the patient closer to the nurse's station, Patient's personal possessions are with in their safe reach, and Keep floor surfaces clean and dry (all applicable per [place] Fall risk assessment)   Continuous monitoring: Pulse Ox or Blood Pressure  IVF/IV medications: Not Applicable   Oxygen: Room Air  Bedside sitter: Not Applicable   Isolation: Not Applicable

## 2024-01-30 NOTE — ED TRIAGE NOTES
Chief Complaint   Patient presents with    Urinary Retention     Patient was discharged from hospital yesterday after 3 day admission for constipation and urinary retention. Patient had prater catheter in place during admission, which patient states was removed yesterday prior to discharge. Patient states he was able to urinate yesterday after prater was removed, but today is unable to urinate       72 yo male to triage for above complaint. Patient reports he picked up his flomax prescription today and took one dose PTA.    Pt is alert and oriented, speaking in full sentences, follows commands and responds appropriately to questions.     Patient placed back in lobby and educated on triage process. Asked to inform RN of any changes.    /75   Pulse 96   Temp 36.6 °C (97.9 °F) (Temporal)   Resp 14   Ht 1.829 m (6')   Wt 103 kg (227 lb 1.2 oz)   SpO2 97%   BMI 30.80 kg/m²

## 2024-01-30 NOTE — ED NOTES
Patient discharged home per ERP.  Discharge teaching and education discussed with patient. POC discussed.   Patient verbalized understanding of discharge teaching and education. No other questions at this time.     PIV removed.     VSS. Patient alert and oriented. Patient arranged ride for self. Able to ambulate off unit safely with steady gait using walker

## 2024-01-30 NOTE — ED NOTES
Urinary drainage bag replaced with leg bag. Pt educated on how to empty bag and change out. Pt verbalized understanding.

## 2024-01-30 NOTE — ED NOTES
Pt given PO med, ERP updated regarding clotted blood discharge from penis, no blood evident in prater or urine

## 2024-01-30 NOTE — ED PROVIDER NOTES
ED Provider Note    CHIEF COMPLAINT  Chief Complaint   Patient presents with    Urinary Retention     Patient was discharged from hospital yesterday after 3 day admission for constipation and urinary retention. Patient had prater catheter in place during admission, which patient states was removed yesterday prior to discharge. Patient states he was able to urinate yesterday after prater was removed, but today is unable to urinate       EXTERNAL RECORDS REVIEWED  Inpatient Notes from admission 125, was discharged 128 for abdominal pain for a year and admitted for constipation and urinary retention, did have a Prater    HPI/ROS  LIMITATION TO HISTORY   Select: : None  OUTSIDE HISTORIAN(S):  none    Fausto Martin is a 73 y.o. male who presents with urinary retention.  Patient was recently hospitalized, did have Prater, was discharged yesterday, states he was able to urinate a small amount before he went home but has not been able to urinate since.  He does report suprapubic pressure otherwise no pain, no other abdominal pain, no flank pain.  He reports no fevers or chills.  He reports no nausea or vomiting or diarrhea.  He has been taking fluids well.    PAST MEDICAL HISTORY   has a past medical history of High cholesterol, Hypertension, and Neuropathy.    SURGICAL HISTORY  patient denies any surgical history    FAMILY HISTORY  No family history on file.    SOCIAL HISTORY  Social History     Tobacco Use    Smoking status: Never    Smokeless tobacco: Never   Vaping Use    Vaping Use: Never used   Substance and Sexual Activity    Alcohol use: Not Currently    Drug use: Never    Sexual activity: Not on file       CURRENT MEDICATIONS  Home Medications       Reviewed by Yuliya Connell R.N. (Registered Nurse) on 01/29/24 at 1618  Med List Status: Partial     Medication Last Dose Status   Acetaminophen (ACETAMIN PO)  Active   amLODIPine (NORVASC) 5 MG Tab  Active   atorvastatin (LIPITOR) 20 MG Tab  Active   gabapentin  (NEURONTIN) 100 MG Cap  Active   linaCLOtide (LINZESS) 145 MCG Cap  Active   lisinopril (PRINIVIL) 40 MG tablet  Active   tamsulosin (FLOMAX) 0.4 MG capsule  Active                    ALLERGIES  No Known Allergies    PHYSICAL EXAM  VITAL SIGNS: /73   Pulse 72   Temp 36.7 °C (98.1 °F) (Temporal)   Resp 15   Ht 1.829 m (6')   Wt 103 kg (227 lb 1.2 oz)   SpO2 95%   BMI 30.80 kg/m²      Pulse ox interpretation: I interpret this pulse ox as normal.  Constitutional: Alert in no apparent distress.  HENT: No signs of trauma, Bilateral external ears normal, Nose normal.   Eyes: Pupils are equal and reactive, Conjunctiva normal, Non-icteric.   Neck: Normal range of motion, No tenderness, Supple, No stridor.   Cardiovascular: Regular rate and rhythm, no murmurs.   Thorax & Lungs: Normal breath sounds, No respiratory distress, No wheezing, No chest tenderness.   Abdomen: Bowel sounds normal, Soft, distended bladder with some tenderness, otherwise, No masses, No pulsatile masses. No peritoneal signs.  Skin: Warm, Dry, No erythema, No rash.   Back: No bony tenderness, No CVA tenderness.   Extremities: Intact distal pulses, No edema, No tenderness, No cyanosis,  Negative Gil's sign.   Musculoskeletal: Good range of motion in all major joints. No tenderness to palpation or major deformities noted.   Neurologic: Alert , Normal motor function, Normal sensory function, No focal deficits noted.   Psychiatric: Affect normal, Judgment normal, Mood normal.               DIAGNOSTIC STUDIES / PROCEDURES  Labs Reviewed   CBC WITH DIFFERENTIAL - Abnormal; Notable for the following components:       Result Value    RBC 4.03 (*)     Hemoglobin 13.3 (*)     Hematocrit 37.1 (*)     Neutrophils-Polys 77.40 (*)     Lymphocytes 9.20 (*)     Lymphs (Absolute) 0.86 (*)     Monos (Absolute) 1.01 (*)     All other components within normal limits   COMP METABOLIC PANEL - Abnormal; Notable for the following components:    Glucose 116 (*)      Total Bilirubin 1.8 (*)     Albumin 2.8 (*)     Globulin 3.8 (*)     All other components within normal limits   URINALYSIS - Abnormal; Notable for the following components:    Occult Blood Trace (*)     All other components within normal limits   ESTIMATED GFR - Abnormal; Notable for the following components:    GFR (CKD-EPI) 56 (*)     All other components within normal limits   URINE MICROSCOPIC (W/UA) - Abnormal; Notable for the following components:    WBC 0-2 (*)     RBC 2-5 (*)     All other components within normal limits           COURSE & MEDICAL DECISION MAKING      INITIAL ASSESSMENT, COURSE AND PLAN  Care Narrative: 5:04 PM  Patient is evaluated the bedside, at this point differential includes urinary retention, obstructive nephropathy, electrolyte or metabolic derangement, acute renal insufficiency.  I have ordered for diagnostic labs, IV fluids, will place Mccabe catheter    700 PM  Patient is reevaluated, updated on results, reports he feels much better after catheter placement.  He states he would like to go home, will plan for discharge with transition to leg bag    HYDRATION: Based on the patient's presentation of Inability to take oral fluids the patient was given IV fluids. IV Hydration was used because oral hydration was not as rapid as required. Upon recheck following hydration, the patient was improved.      PROBLEM LIST  # Urinary retention.  Patient was having some issues with this while in the hospital but apparently was able to void.  At this point unable to void again so will leave indwelling Mccabe, refer to follow-up with outpatient urology.  There is no evidence of renal dysfunction, no evidence of urinary tract infection.  He was just given prescription for Flomax as well  DISPOSITION AND DISCUSSIONS    Barriers to care at this time, including but not limited to:  none .     Decision tools and prescription drugs considered including, but not limited to: Medication modification  considered but he was just started on Flomax .     The patient will return for new or worsening symptoms and is stable at the time of discharge.    The patient is referred to a primary physician for blood pressure management, diabetic screening, and for all other preventative health concerns.      DISPOSITION:  Patient will be discharged home in stable condition.    FOLLOW UP:  Buck Jurado M.D.  [address]  [address]  [place] [address]  [phone]    Schedule an appointment as soon as possible for a visit in 1 week        OUTPATIENT MEDICATIONS:  Discharge Medication List as of 1/29/2024  8:26 PM            FINAL DIAGNOSIS  1. Urinary retention           Electronically signed by: Bismark Jurado M.D., 1/29/2024 5:03 PM

## 2024-02-02 ENCOUNTER — HOSPITAL ENCOUNTER (EMERGENCY)
Facility: MEDICAL CENTER | Age: 74
End: 2024-02-03
Attending: STUDENT IN AN ORGANIZED HEALTH CARE EDUCATION/TRAINING PROGRAM
Payer: MEDICARE

## 2024-02-02 ENCOUNTER — APPOINTMENT (OUTPATIENT)
Dept: RADIOLOGY | Facility: MEDICAL CENTER | Age: 74
End: 2024-02-02
Attending: STUDENT IN AN ORGANIZED HEALTH CARE EDUCATION/TRAINING PROGRAM
Payer: MEDICARE

## 2024-02-02 DIAGNOSIS — R33.9 URINARY RETENTION: ICD-10-CM

## 2024-02-02 PROCEDURE — 36415 COLL VENOUS BLD VENIPUNCTURE: CPT

## 2024-02-02 PROCEDURE — 99284 EMERGENCY DEPT VISIT MOD MDM: CPT

## 2024-02-02 PROCEDURE — 303105 HCHG CATHETER EXTRA

## 2024-02-02 PROCEDURE — 51702 INSERT TEMP BLADDER CATH: CPT

## 2024-02-02 RX ORDER — LIDOCAINE HYDROCHLORIDE 20 MG/ML
JELLY TOPICAL ONCE
Status: COMPLETED | OUTPATIENT
Start: 2024-02-03 | End: 2024-02-03

## 2024-02-02 ASSESSMENT — PAIN DESCRIPTION - PAIN TYPE: TYPE: ACUTE PAIN

## 2024-02-02 ASSESSMENT — FIBROSIS 4 INDEX: FIB4 SCORE: 2.36

## 2024-02-03 ENCOUNTER — APPOINTMENT (OUTPATIENT)
Dept: RADIOLOGY | Facility: MEDICAL CENTER | Age: 74
End: 2024-02-03
Attending: STUDENT IN AN ORGANIZED HEALTH CARE EDUCATION/TRAINING PROGRAM
Payer: MEDICARE

## 2024-02-03 VITALS
SYSTOLIC BLOOD PRESSURE: 119 MMHG | HEIGHT: 72 IN | DIASTOLIC BLOOD PRESSURE: 78 MMHG | HEART RATE: 63 BPM | OXYGEN SATURATION: 94 % | WEIGHT: 200 LBS | BODY MASS INDEX: 27.09 KG/M2 | TEMPERATURE: 98.5 F | RESPIRATION RATE: 20 BRPM

## 2024-02-03 LAB
ALBUMIN SERPL BCP-MCNC: 3.8 G/DL (ref 3.2–4.9)
ALBUMIN/GLOB SERPL: 1.5 G/DL
ALP SERPL-CCNC: 63 U/L (ref 30–99)
ALT SERPL-CCNC: 28 U/L (ref 2–50)
ANION GAP SERPL CALC-SCNC: 12 MMOL/L (ref 7–16)
APPEARANCE UR: ABNORMAL
AST SERPL-CCNC: 28 U/L (ref 12–45)
BACTERIA #/AREA URNS HPF: NEGATIVE /HPF
BASOPHILS # BLD AUTO: 0.4 % (ref 0–1.8)
BASOPHILS # BLD: 0.04 K/UL (ref 0–0.12)
BILIRUB SERPL-MCNC: 1.3 MG/DL (ref 0.1–1.5)
BILIRUB UR QL STRIP.AUTO: NEGATIVE
BUN SERPL-MCNC: 23 MG/DL (ref 8–22)
CALCIUM ALBUM COR SERPL-MCNC: 8.7 MG/DL (ref 8.5–10.5)
CALCIUM SERPL-MCNC: 8.5 MG/DL (ref 8.5–10.5)
CHLORIDE SERPL-SCNC: 103 MMOL/L (ref 96–112)
CO2 SERPL-SCNC: 20 MMOL/L (ref 20–33)
COLOR UR: ABNORMAL
CREAT SERPL-MCNC: 1.2 MG/DL (ref 0.5–1.4)
EOSINOPHIL # BLD AUTO: 0.19 K/UL (ref 0–0.51)
EOSINOPHIL NFR BLD: 2 % (ref 0–6.9)
EPI CELLS #/AREA URNS HPF: ABNORMAL /HPF
ERYTHROCYTE [DISTWIDTH] IN BLOOD BY AUTOMATED COUNT: 45.6 FL (ref 35.9–50)
GFR SERPLBLD CREATININE-BSD FMLA CKD-EPI: 63 ML/MIN/1.73 M 2
GLOBULIN SER CALC-MCNC: 2.6 G/DL (ref 1.9–3.5)
GLUCOSE SERPL-MCNC: 115 MG/DL (ref 65–99)
GLUCOSE UR STRIP.AUTO-MCNC: NEGATIVE MG/DL
HCT VFR BLD AUTO: 39.2 % (ref 42–52)
HGB BLD-MCNC: 14 G/DL (ref 14–18)
HYALINE CASTS #/AREA URNS LPF: ABNORMAL /LPF
IMM GRANULOCYTES # BLD AUTO: 0.06 K/UL (ref 0–0.11)
IMM GRANULOCYTES NFR BLD AUTO: 0.6 % (ref 0–0.9)
KETONES UR STRIP.AUTO-MCNC: ABNORMAL MG/DL
LEUKOCYTE ESTERASE UR QL STRIP.AUTO: ABNORMAL
LYMPHOCYTES # BLD AUTO: 0.85 K/UL (ref 1–4.8)
LYMPHOCYTES NFR BLD: 9 % (ref 22–41)
MCH RBC QN AUTO: 33.2 PG (ref 27–33)
MCHC RBC AUTO-ENTMCNC: 35.7 G/DL (ref 32.3–36.5)
MCV RBC AUTO: 92.9 FL (ref 81.4–97.8)
MICRO URNS: ABNORMAL
MONOCYTES # BLD AUTO: 0.81 K/UL (ref 0–0.85)
MONOCYTES NFR BLD AUTO: 8.6 % (ref 0–13.4)
MUCOUS THREADS #/AREA URNS HPF: ABNORMAL /HPF
NEUTROPHILS # BLD AUTO: 7.46 K/UL (ref 1.82–7.42)
NEUTROPHILS NFR BLD: 79.4 % (ref 44–72)
NITRITE UR QL STRIP.AUTO: NEGATIVE
NRBC # BLD AUTO: 0 K/UL
NRBC BLD-RTO: 0 /100 WBC (ref 0–0.2)
PH UR STRIP.AUTO: 5.5 [PH] (ref 5–8)
PLATELET # BLD AUTO: 198 K/UL (ref 164–446)
PMV BLD AUTO: 9.9 FL (ref 9–12.9)
POTASSIUM SERPL-SCNC: 3.9 MMOL/L (ref 3.6–5.5)
PROT SERPL-MCNC: 6.4 G/DL (ref 6–8.2)
PROT UR QL STRIP: 300 MG/DL
RBC # BLD AUTO: 4.22 M/UL (ref 4.7–6.1)
RBC # URNS HPF: ABNORMAL /HPF
RBC UR QL AUTO: ABNORMAL
SODIUM SERPL-SCNC: 135 MMOL/L (ref 135–145)
SP GR UR STRIP.AUTO: 1.03
UROBILINOGEN UR STRIP.AUTO-MCNC: 1 MG/DL
WBC # BLD AUTO: 9.4 K/UL (ref 4.8–10.8)
WBC #/AREA URNS HPF: ABNORMAL /HPF

## 2024-02-03 PROCEDURE — 700101 HCHG RX REV CODE 250: Mod: UD | Performed by: STUDENT IN AN ORGANIZED HEALTH CARE EDUCATION/TRAINING PROGRAM

## 2024-02-03 PROCEDURE — 80053 COMPREHEN METABOLIC PANEL: CPT

## 2024-02-03 PROCEDURE — 85025 COMPLETE CBC W/AUTO DIFF WBC: CPT

## 2024-02-03 PROCEDURE — 72131 CT LUMBAR SPINE W/O DYE: CPT

## 2024-02-03 PROCEDURE — 87086 URINE CULTURE/COLONY COUNT: CPT

## 2024-02-03 PROCEDURE — A9270 NON-COVERED ITEM OR SERVICE: HCPCS | Mod: UD | Performed by: STUDENT IN AN ORGANIZED HEALTH CARE EDUCATION/TRAINING PROGRAM

## 2024-02-03 PROCEDURE — 81001 URINALYSIS AUTO W/SCOPE: CPT

## 2024-02-03 PROCEDURE — 700102 HCHG RX REV CODE 250 W/ 637 OVERRIDE(OP): Mod: UD | Performed by: STUDENT IN AN ORGANIZED HEALTH CARE EDUCATION/TRAINING PROGRAM

## 2024-02-03 PROCEDURE — 72192 CT PELVIS W/O DYE: CPT

## 2024-02-03 RX ORDER — LIDOCAINE 50 MG/G
1 PATCH TOPICAL EVERY 24 HOURS
Qty: 10 PATCH | Refills: 0 | Status: SHIPPED | OUTPATIENT
Start: 2024-02-03 | End: 2024-02-29

## 2024-02-03 RX ORDER — LIDOCAINE 50 MG/G
1 PATCH TOPICAL EVERY 24 HOURS
Qty: 10 PATCH | Refills: 0 | Status: SHIPPED | OUTPATIENT
Start: 2024-02-03 | End: 2024-02-03

## 2024-02-03 RX ORDER — CEPHALEXIN 500 MG/1
500 CAPSULE ORAL ONCE
Status: COMPLETED | OUTPATIENT
Start: 2024-02-03 | End: 2024-02-03

## 2024-02-03 RX ORDER — CEPHALEXIN 500 MG/1
500 CAPSULE ORAL 2 TIMES DAILY
Qty: 10 CAPSULE | Refills: 0 | Status: ACTIVE | OUTPATIENT
Start: 2024-02-03 | End: 2024-02-03

## 2024-02-03 RX ORDER — CEPHALEXIN 500 MG/1
500 CAPSULE ORAL 2 TIMES DAILY
Qty: 10 CAPSULE | Refills: 0 | Status: ACTIVE | OUTPATIENT
Start: 2024-02-03 | End: 2024-02-08

## 2024-02-03 RX ORDER — OXYCODONE HYDROCHLORIDE AND ACETAMINOPHEN 5; 325 MG/1; MG/1
1 TABLET ORAL ONCE
Status: COMPLETED | OUTPATIENT
Start: 2024-02-03 | End: 2024-02-03

## 2024-02-03 RX ADMIN — LIDOCAINE HYDROCHLORIDE 1 SYRINGE: 20 JELLY TOPICAL at 00:10

## 2024-02-03 RX ADMIN — CEPHALEXIN 500 MG: 500 CAPSULE ORAL at 02:00

## 2024-02-03 RX ADMIN — OXYCODONE AND ACETAMINOPHEN 1 TABLET: 5; 325 TABLET ORAL at 02:15

## 2024-02-03 NOTE — DISCHARGE INSTRUCTIONS
We have given you prescription for antibiotics which you should use as directed.  If your Mccabe catheter stops draining develop uncontrolled pain, vomiting or fever please return to the emergency room.  You should follow-up with your urologist.

## 2024-02-03 NOTE — ED NOTES
Patient discharged from [facility] ED to home with MT. Discharge teaching completed at bedside and patient signature obtained. Discharge medications reviewed and verbalized by patient and/or family. All questions and concerns addressed. Follow up explained with return instructions. All patient belongings with pt at time of discharge. Patient wheeled to lobby with all belongings at time of discharge to lobby. Mode of transportation: [facility]

## 2024-02-03 NOTE — ED PROVIDER NOTES
ED Provider Note    CHIEF COMPLAINT  Chief Complaint   Patient presents with    Urinary Retention     BIB [facility] from home for urinary retention since 1830 tonight. Pt recently diagnosed with acute urinary retention and self caths at home however pt unable to cath himself tonight and reports blood and blood clots when attempted, no urine.     T-5000 GLF     3 days ago, pt reports he lost his balance and hit his head. + tailbone strike and associated pain. - LOC, blood thinners.       EXTERNAL RECORDS REVIEWED  Outpatient Notes ED visit from 1/29/2024 patient was evaluated for urinary retention discharged after placement of Mccabe catheter    HPI/ROS  LIMITATION TO HISTORY     OUTSIDE HISTORIAN(S):      Fausto Martin is a 73 y.o. male who presents with urinary retention.  Patient says he was recently seen in the emergency ferment for urinary retention and had Mccabe catheter placed.  Patient says he went to the urology office 2 days ago and had Mccabe catheter removed and was given kit for self-catheterization.  Patient says he was able to self catheter his eyes for 1 day.  Patient says that this evening despite 3 different times he was unable to self cath and has had worsening suprapubic pain.  Patient says he also fell 3 days ago has been having ongoing pain in his sacrum.  Patient denies headache, chest pain, shortness of breath preceding fall.  Patient denies fever, chills, vomiting.    PAST MEDICAL HISTORY   has a past medical history of High cholesterol, Hypertension, and Neuropathy.    SURGICAL HISTORY  patient denies any surgical history    FAMILY HISTORY  No family history on file.    SOCIAL HISTORY  Social History     Tobacco Use    Smoking status: Never    Smokeless tobacco: Never   Vaping Use    Vaping Use: Never used   Substance and Sexual Activity    Alcohol use: Not Currently    Drug use: Never    Sexual activity: Not on file       CURRENT MEDICATIONS  Home Medications       Reviewed by Demi BLANK  CISCO Delgado (Registered Nurse) on 02/02/24 at 2332  Med List Status: Partial     Medication Last Dose Status   Acetaminophen (ACETAMIN PO)  Active   amLODIPine (NORVASC) 5 MG Tab  Active   atorvastatin (LIPITOR) 20 MG Tab  Active   gabapentin (NEURONTIN) 100 MG Cap  Active   linaCLOtide (LINZESS) 145 MCG Cap  Active   lisinopril (PRINIVIL) 40 MG tablet  Active   tamsulosin (FLOMAX) 0.4 MG capsule  Active                    ALLERGIES  No Known Allergies    PHYSICAL EXAM  VITAL SIGNS: /78   Pulse 63   Temp 36.8 °C (98.3 °F) (Temporal)   Resp 20   Ht 1.829 m (6')   Wt 90.7 kg (200 lb)   SpO2 94%   BMI 27.12 kg/m²    Constitutional: Alert in no apparent distress.  HENT: No signs of trauma, Bilateral external ears normal, Nose normal.   Eyes: Pupils are equal and reactive, Conjunctiva normal, Non-icteric.   Neck: Normal range of motion, No tenderness, Supple, No stridor.   Cardiovascular: Regular rate and rhythm, no murmurs.   Thorax & Lungs: Normal breath sounds, No respiratory distress, No wheezing, No chest tenderness.   Abdomen: Bowel sounds normal, Soft, No tenderness, No masses, No pulsatile masses.   Skin: Warm, Dry, No erythema, No rash.   Back: Tenderness in midline sacrum otherwise no bony tenderness, No CVA tenderness.   Extremities: Intact distal pulses, No edema, No tenderness, No cyanosis  Musculoskeletal: Good range of motion in all major joints. No tenderness to palpation or major deformities noted.   Neurologic: Alert , Normal motor function, Normal sensory function, No focal deficits noted.  No saddle anesthesia, 5/5 strength in bilateral lower extremities      DIAGNOSTIC STUDIES / PROCEDURES  EKG      LABS  Labs Reviewed   CBC WITH DIFFERENTIAL - Abnormal; Notable for the following components:       Result Value    RBC 4.22 (*)     Hematocrit 39.2 (*)     MCH 33.2 (*)     Neutrophils-Polys 79.40 (*)     Lymphocytes 9.00 (*)     Neutrophils (Absolute) 7.46 (*)     Lymphs (Absolute) 0.85 (*)      All other components within normal limits   COMP METABOLIC PANEL - Abnormal; Notable for the following components:    Glucose 115 (*)     Bun 23 (*)     All other components within normal limits   URINALYSIS,CULTURE IF INDICATED - Abnormal; Notable for the following components:    Character Turbid (*)     Ketones Trace (*)     Protein 300 (*)     Leukocyte Esterase Moderate (*)     Occult Blood Large (*)     All other components within normal limits    Narrative:     Indication for culture:->Patient WITHOUT an indwelling Mccabe  catheter in place with new onset of Dysuria, Frequency,  Urgency, and/or Suprapubic pain   URINE MICROSCOPIC (W/UA) - Abnormal; Notable for the following components:    WBC 20-50 (*)     -150 (*)     All other components within normal limits    Narrative:     Indication for culture:->Patient WITHOUT an indwelling Mccabe  catheter in place with new onset of Dysuria, Frequency,  Urgency, and/or Suprapubic pain   ESTIMATED GFR   URINE CULTURE(NEW)    Narrative:     Indication for culture:->Patient WITHOUT an indwelling Mccabe  catheter in place with new onset of Dysuria, Frequency,  Urgency, and/or Suprapubic pain         RADIOLOGY  I have independently interpreted the diagnostic imaging associated with this visit and am waiting the final reading from the radiologist.   My preliminary interpretation is as follows: No fracture or dislocation  Radiologist interpretation:   CT-PELVIS W/O PLUS RECONS   Final Result      1.  Lumbar scoliosis convex left, particularly in the field-of-view.   2.  No acute sacral fracture, hip fracture, or bony pelvis fracture. Acute angulation at the tip of the coccyx which is unchanged from 2021.   3.  Mccabe catheter in place with decompression of the urinary bladder.   4.  Sigmoid colon diverticulosis.      CT-LSPINE W/O PLUS RECONS   Final Result      1.  Lumbar scoliosis convex left. Lateral subluxation of L3 on L4.   2.  Multilevel degenerative changes.    3.  Left L2 and L3 transverse process ununited ossification centers versus old nonunion fractures. These are well-corticated and do not represent acute fractures.   4.  No evidence of acute fracture.            COURSE & MEDICAL DECISION MAKING    ED Observation Status?     INITIAL ASSESSMENT, COURSE AND PLAN  Care Narrative: Vital signs within normal limits.  Patient with normal neurologic exam no saddle anesthesia low suspicion for progressive acute neurologic disease.  Mccabe catheter was successfully placed with drainage of urine and relief of suprapubic pain.  Patient does have tenderness over sacrum with recent trauma will obtain CT imaging to assess for fracture.  Patient treated with analgesics.  Will obtain blood work to assess for signs of kidney dysfunction hematologic derangements.    Urinalysis with leukocyte esterase, pyuria, will treat for possible UTI.  CT imaging fortunately denies any signs of acute injury.  Patient's kidney function normal no significant hematologic derangements.  Patient has previously followed up with urology.  Patient discharged with return precautions and plan for outpatient urology follow-up.  Patient given prescription for antibiotics for possible UTI.        ADDITIONAL PROBLEM LIST    DISPOSITION AND DISCUSSIONS    FINAL DIAGNOSIS  1. Urinary retention           Electronically signed by: Carlos Alberto Turcios D.O., 2/2/2024 11:41 PM

## 2024-02-03 NOTE — ED TRIAGE NOTES
Fausto Martin  73 y.o. male  Chief Complaint   Patient presents with    Urinary Retention     BIB [facility] from home for urinary retention since 1830 tonight. Pt recently diagnosed with acute urinary retention and self caths at home however pt unable to cath himself tonight and reports blood and blood clots when attempted, no urine.     T-5000 GLF     3 days ago, pt reports he lost his balance and hit his head. + tailbone strike and associated pain. - LOC, blood thinners.     BIB [facility] for above from home for above complaint.     Pt is alert, oriented, and follows commands. Pt speaking in full sentences and responds appropriately to questions. No acute distress noted in triage and respirations are even and unlabored.     Pt placed in BLUE 18 and educated on triage process. Pt encouraged to alert staff for any changes in condition. Care assumed, chart up for ERP.    Pt reports recent prater insertion in the hospital. Bladder scan on arrival: 0 ml

## 2024-02-03 NOTE — ED NOTES
16F prater cath placed by this RN, 10 ml urine output present. + small blood clots in urine. Urine sample collected and sent to lab.

## 2024-02-04 ENCOUNTER — HOSPITAL ENCOUNTER (EMERGENCY)
Facility: MEDICAL CENTER | Age: 74
End: 2024-02-04
Attending: EMERGENCY MEDICINE
Payer: MEDICARE

## 2024-02-04 VITALS
TEMPERATURE: 98 F | DIASTOLIC BLOOD PRESSURE: 72 MMHG | WEIGHT: 200 LBS | SYSTOLIC BLOOD PRESSURE: 128 MMHG | BODY MASS INDEX: 27.09 KG/M2 | HEART RATE: 74 BPM | OXYGEN SATURATION: 95 % | HEIGHT: 72 IN | RESPIRATION RATE: 18 BRPM

## 2024-02-04 DIAGNOSIS — N32.89 BLADDER SPASMS: ICD-10-CM

## 2024-02-04 DIAGNOSIS — N39.0 ACUTE UTI: ICD-10-CM

## 2024-02-04 LAB
ALBUMIN SERPL BCP-MCNC: 3.9 G/DL (ref 3.2–4.9)
ALBUMIN/GLOB SERPL: 1.3 G/DL
ALP SERPL-CCNC: 72 U/L (ref 30–99)
ALT SERPL-CCNC: 30 U/L (ref 2–50)
ANION GAP SERPL CALC-SCNC: 14 MMOL/L (ref 7–16)
APPEARANCE UR: CLEAR
AST SERPL-CCNC: 20 U/L (ref 12–45)
BACTERIA #/AREA URNS HPF: NEGATIVE /HPF
BASOPHILS # BLD AUTO: 0.5 % (ref 0–1.8)
BASOPHILS # BLD: 0.04 K/UL (ref 0–0.12)
BILIRUB SERPL-MCNC: 1.7 MG/DL (ref 0.1–1.5)
BILIRUB UR QL STRIP.AUTO: NEGATIVE
BUN SERPL-MCNC: 21 MG/DL (ref 8–22)
CALCIUM ALBUM COR SERPL-MCNC: 8.9 MG/DL (ref 8.5–10.5)
CALCIUM SERPL-MCNC: 8.8 MG/DL (ref 8.5–10.5)
CHLORIDE SERPL-SCNC: 102 MMOL/L (ref 96–112)
CO2 SERPL-SCNC: 19 MMOL/L (ref 20–33)
COLOR UR: ABNORMAL
CREAT SERPL-MCNC: 1.17 MG/DL (ref 0.5–1.4)
EOSINOPHIL # BLD AUTO: 0.29 K/UL (ref 0–0.51)
EOSINOPHIL NFR BLD: 3.7 % (ref 0–6.9)
EPI CELLS #/AREA URNS HPF: ABNORMAL /HPF
ERYTHROCYTE [DISTWIDTH] IN BLOOD BY AUTOMATED COUNT: 45.3 FL (ref 35.9–50)
GFR SERPLBLD CREATININE-BSD FMLA CKD-EPI: 65 ML/MIN/1.73 M 2
GLOBULIN SER CALC-MCNC: 2.9 G/DL (ref 1.9–3.5)
GLUCOSE SERPL-MCNC: 137 MG/DL (ref 65–99)
GLUCOSE UR STRIP.AUTO-MCNC: NEGATIVE MG/DL
HCT VFR BLD AUTO: 41.4 % (ref 42–52)
HGB BLD-MCNC: 14.6 G/DL (ref 14–18)
HYALINE CASTS #/AREA URNS LPF: ABNORMAL /LPF
IMM GRANULOCYTES # BLD AUTO: 0.05 K/UL (ref 0–0.11)
IMM GRANULOCYTES NFR BLD AUTO: 0.6 % (ref 0–0.9)
KETONES UR STRIP.AUTO-MCNC: 15 MG/DL
LACTATE SERPL-SCNC: 1.1 MMOL/L (ref 0.5–2)
LEUKOCYTE ESTERASE UR QL STRIP.AUTO: ABNORMAL
LIPASE SERPL-CCNC: 32 U/L (ref 11–82)
LYMPHOCYTES # BLD AUTO: 1.01 K/UL (ref 1–4.8)
LYMPHOCYTES NFR BLD: 12.8 % (ref 22–41)
MCH RBC QN AUTO: 32.7 PG (ref 27–33)
MCHC RBC AUTO-ENTMCNC: 35.3 G/DL (ref 32.3–36.5)
MCV RBC AUTO: 92.8 FL (ref 81.4–97.8)
MICRO URNS: ABNORMAL
MONOCYTES # BLD AUTO: 0.71 K/UL (ref 0–0.85)
MONOCYTES NFR BLD AUTO: 9 % (ref 0–13.4)
NEUTROPHILS # BLD AUTO: 5.82 K/UL (ref 1.82–7.42)
NEUTROPHILS NFR BLD: 73.4 % (ref 44–72)
NITRITE UR QL STRIP.AUTO: NEGATIVE
NRBC # BLD AUTO: 0 K/UL
NRBC BLD-RTO: 0 /100 WBC (ref 0–0.2)
PH UR STRIP.AUTO: 6 [PH] (ref 5–8)
PLATELET # BLD AUTO: 248 K/UL (ref 164–446)
PMV BLD AUTO: 10.2 FL (ref 9–12.9)
POTASSIUM SERPL-SCNC: 3.7 MMOL/L (ref 3.6–5.5)
PROT SERPL-MCNC: 6.8 G/DL (ref 6–8.2)
PROT UR QL STRIP: 30 MG/DL
RBC # BLD AUTO: 4.46 M/UL (ref 4.7–6.1)
RBC # URNS HPF: ABNORMAL /HPF
RBC UR QL AUTO: ABNORMAL
RENAL EPI CELLS #/AREA URNS HPF: ABNORMAL /HPF
SODIUM SERPL-SCNC: 135 MMOL/L (ref 135–145)
SP GR UR STRIP.AUTO: 1.03
UROBILINOGEN UR STRIP.AUTO-MCNC: 1 MG/DL
WBC # BLD AUTO: 7.9 K/UL (ref 4.8–10.8)
WBC #/AREA URNS HPF: ABNORMAL /HPF

## 2024-02-04 PROCEDURE — 85025 COMPLETE CBC W/AUTO DIFF WBC: CPT

## 2024-02-04 PROCEDURE — 99284 EMERGENCY DEPT VISIT MOD MDM: CPT

## 2024-02-04 PROCEDURE — 700111 HCHG RX REV CODE 636 W/ 250 OVERRIDE (IP): Mod: JZ,UD | Performed by: EMERGENCY MEDICINE

## 2024-02-04 PROCEDURE — RXMED WILLOW AMBULATORY MEDICATION CHARGE: Performed by: EMERGENCY MEDICINE

## 2024-02-04 PROCEDURE — 80053 COMPREHEN METABOLIC PANEL: CPT

## 2024-02-04 PROCEDURE — 96365 THER/PROPH/DIAG IV INF INIT: CPT

## 2024-02-04 PROCEDURE — 36415 COLL VENOUS BLD VENIPUNCTURE: CPT

## 2024-02-04 PROCEDURE — 83605 ASSAY OF LACTIC ACID: CPT

## 2024-02-04 PROCEDURE — 83690 ASSAY OF LIPASE: CPT

## 2024-02-04 PROCEDURE — A9270 NON-COVERED ITEM OR SERVICE: HCPCS | Mod: UD | Performed by: EMERGENCY MEDICINE

## 2024-02-04 PROCEDURE — 700102 HCHG RX REV CODE 250 W/ 637 OVERRIDE(OP): Mod: UD | Performed by: EMERGENCY MEDICINE

## 2024-02-04 PROCEDURE — 81001 URINALYSIS AUTO W/SCOPE: CPT

## 2024-02-04 PROCEDURE — 700105 HCHG RX REV CODE 258: Mod: UD | Performed by: EMERGENCY MEDICINE

## 2024-02-04 RX ORDER — CEPHALEXIN 500 MG/1
500 CAPSULE ORAL ONCE
Status: COMPLETED | OUTPATIENT
Start: 2024-02-04 | End: 2024-02-04

## 2024-02-04 RX ORDER — DICYCLOMINE HCL 20 MG
20 TABLET ORAL 2 TIMES DAILY PRN
Qty: 14 TABLET | Refills: 0 | Status: SHIPPED | OUTPATIENT
Start: 2024-02-04 | End: 2024-02-12

## 2024-02-04 RX ORDER — ACETAMINOPHEN 500 MG
1000 TABLET ORAL ONCE
Status: COMPLETED | OUTPATIENT
Start: 2024-02-04 | End: 2024-02-04

## 2024-02-04 RX ORDER — DICYCLOMINE HCL 20 MG
20 TABLET ORAL ONCE
Status: COMPLETED | OUTPATIENT
Start: 2024-02-04 | End: 2024-02-04

## 2024-02-04 RX ADMIN — ACETAMINOPHEN 1000 MG: 500 TABLET ORAL at 02:20

## 2024-02-04 RX ADMIN — CEPHALEXIN 500 MG: 500 CAPSULE ORAL at 02:21

## 2024-02-04 RX ADMIN — CEFTRIAXONE SODIUM 2000 MG: 2 INJECTION, POWDER, FOR SOLUTION INTRAMUSCULAR; INTRAVENOUS at 03:57

## 2024-02-04 RX ADMIN — DICYCLOMINE HYDROCHLORIDE 20 MG: 20 TABLET ORAL at 02:21

## 2024-02-04 ASSESSMENT — PAIN DESCRIPTION - PAIN TYPE
TYPE: ACUTE PAIN
TYPE: ACUTE PAIN

## 2024-02-04 ASSESSMENT — FIBROSIS 4 INDEX: FIB4 SCORE: 1.95

## 2024-02-04 NOTE — ED TRIAGE NOTES
Chief Complaint   Patient presents with    UTI     Pt reports dx w/ UTI x yeterday. Dc'ed from [facility] ED w/ Mccabe cath and abx prescription. Pt unable to  ABX because pharm closed until Monday. Pt endorses bladder region pain 8/10.        72 y/o male ambulatory to triage for above complaint. Aaox4. Abd pain protocol order in process.     Pt place in waiting area. Educated on triage process. Pt will inform staff of any medical changes.    BP 95/62   Pulse 92   Temp 35.9 °C (96.7 °F) (Temporal)   Resp 19   Ht 1.829 m (6')   Wt 90.7 kg (200 lb)   SpO2 100%   BMI 27.12 kg/m²

## 2024-02-04 NOTE — ED PROVIDER NOTES
ER Provider Note    Scribed for Tyler Jackson Ii, M.d. by Carmen Lugo. 2/4/2024  1:50 AM    Primary Care Provider: Ida Severino P.A.-C.    CHIEF COMPLAINT  Chief Complaint   Patient presents with    UTI     Pt reports dx w/ UTI x yeterday. NV'ed from [facility] ED w/ Prater cath and abx prescription. Pt unable to  ABX because pharm closed until Monday. Pt endorses bladder region pain 8/10.      EXTERNAL RECORDS REVIEWED  The patient has history of urinary retention. He had been seen by urology last week and had been given instructions and a kit for self catheterization. The patient had urinary retention symptoms yesterday. He came back to the ER and prater was placed. The patient was found to have a UTI and was prescribed antibiotics    HPI/ROS  LIMITATION TO HISTORY   Select: : None  OUTSIDE HISTORIAN(S):  None.    Fausto Martin is a 73 y.o. male who presents to the ED for evaluation of urinary tract infection. The patient was diagnosed with a urinary tract infection yesterday and was discharged from here with a prater catheter in place. The patient was prescribed Keflex. He describes that his pharmacy was closed all weekend, so he was unable to get his antibiotics. The patient reports that he can pick them up tomorrow when they open, but would like to receive a dose here. The patient reports that he is having abdominal cramping and discomfort. He also notes that he has pain to his bladder region that is an 8/10 in severity. The patient notes that he has not been able to eat secondary to a decrease in appetite. The patient denies any vomiting.      PAST MEDICAL HISTORY  Past Medical History:   Diagnosis Date    High cholesterol     Hypertension     Neuropathy        SURGICAL HISTORY  History reviewed. No pertinent surgical history.      FAMILY HISTORY  History reviewed. No pertinent family history.      SOCIAL HISTORY   reports that he has never smoked. He has never used smokeless  tobacco. He reports that he does not currently use alcohol. He reports that he does not use drugs.      CURRENT MEDICATIONS  Discharge Medication List as of 2/4/2024  5:00 AM        CONTINUE these medications which have NOT CHANGED    Details   cephALEXin (KEFLEX) 500 MG Cap Take 1 Capsule by mouth 2 times a day for 5 days., Disp-10 Capsule, R-0, Normal      lidocaine (LIDODERM) 5 % Patch Place 1 Patch on the skin every 24 hours., Disp-10 Patch, R-0, Normal      tamsulosin (FLOMAX) 0.4 MG capsule Take 1 Capsule by mouth 1/2 hour after breakfast., Disp-30 Capsule, R-5, Normal      gabapentin (NEURONTIN) 100 MG Cap Take 100 mg by mouth 3 times a day as needed (pain)., Historical Med      amLODIPine (NORVASC) 5 MG Tab Take 5 mg by mouth every day., Historical Med      atorvastatin (LIPITOR) 20 MG Tab Take 20 mg by mouth every evening., Historical Med      linaCLOtide (LINZESS) 145 MCG Cap Take 1 Capsule by mouth every morning., Historical Med      Acetaminophen (ACETAMIN PO) Take 3 Tablets by mouth 1 time a day as needed (mild pain)., Historical Med      lisinopril (PRINIVIL) 40 MG tablet Take 40 mg by mouth every day., Historical Med             ALLERGIES  Patient has no known allergies.      PHYSICAL EXAM  BP 95/62   Pulse 92   Temp 35.9 °C (96.7 °F) (Temporal)   Resp 19   Ht 1.829 m (6')   Wt 90.7 kg (200 lb)   SpO2 100%   BMI 27.12 kg/m²     Physical Exam  Vitals and nursing note reviewed.   Constitutional:       Appearance: Normal appearance.   HENT:      Head: Normocephalic.      Mouth/Throat:      Mouth: Mucous membranes are moist.   Eyes:      Extraocular Movements: Extraocular movements intact.      Pupils: Pupils are equal, round, and reactive to light.   Cardiovascular:      Rate and Rhythm: Normal rate.   Pulmonary:      Effort: Pulmonary effort is normal.      Breath sounds: Normal breath sounds.   Abdominal:      Comments: Discomfort at suprapubic region but no guarding. No distension    Genitourinary:     Comments: Mccabe catheter draining clear yellow urine  Neurological:      General: No focal deficit present.      Mental Status: He is alert.          DIAGNOSTIC STUDIES    Labs:   Results for orders placed or performed during the hospital encounter of 02/04/24   CBC WITH DIFFERENTIAL   Result Value Ref Range    WBC 7.9 4.8 - 10.8 K/uL    RBC 4.46 (L) 4.70 - 6.10 M/uL    Hemoglobin 14.6 14.0 - 18.0 g/dL    Hematocrit 41.4 (L) 42.0 - 52.0 %    MCV 92.8 81.4 - 97.8 fL    MCH 32.7 27.0 - 33.0 pg    MCHC 35.3 32.3 - 36.5 g/dL    RDW 45.3 35.9 - 50.0 fL    Platelet Count 248 164 - 446 K/uL    MPV 10.2 9.0 - 12.9 fL    Neutrophils-Polys 73.40 (H) 44.00 - 72.00 %    Lymphocytes 12.80 (L) 22.00 - 41.00 %    Monocytes 9.00 0.00 - 13.40 %    Eosinophils 3.70 0.00 - 6.90 %    Basophils 0.50 0.00 - 1.80 %    Immature Granulocytes 0.60 0.00 - 0.90 %    Nucleated RBC 0.00 0.00 - 0.20 /100 WBC    Neutrophils (Absolute) 5.82 1.82 - 7.42 K/uL    Lymphs (Absolute) 1.01 1.00 - 4.80 K/uL    Monos (Absolute) 0.71 0.00 - 0.85 K/uL    Eos (Absolute) 0.29 0.00 - 0.51 K/uL    Baso (Absolute) 0.04 0.00 - 0.12 K/uL    Immature Granulocytes (abs) 0.05 0.00 - 0.11 K/uL    NRBC (Absolute) 0.00 K/uL   COMP METABOLIC PANEL   Result Value Ref Range    Sodium 135 135 - 145 mmol/L    Potassium 3.7 3.6 - 5.5 mmol/L    Chloride 102 96 - 112 mmol/L    Co2 19 (L) 20 - 33 mmol/L    Anion Gap 14.0 7.0 - 16.0    Glucose 137 (H) 65 - 99 mg/dL    Bun 21 8 - 22 mg/dL    Creatinine 1.17 0.50 - 1.40 mg/dL    Calcium 8.8 8.5 - 10.5 mg/dL    Correct Calcium 8.9 8.5 - 10.5 mg/dL    AST(SGOT) 20 12 - 45 U/L    ALT(SGPT) 30 2 - 50 U/L    Alkaline Phosphatase 72 30 - 99 U/L    Total Bilirubin 1.7 (H) 0.1 - 1.5 mg/dL    Albumin 3.9 3.2 - 4.9 g/dL    Total Protein 6.8 6.0 - 8.2 g/dL    Globulin 2.9 1.9 - 3.5 g/dL    A-G Ratio 1.3 g/dL   LIPASE   Result Value Ref Range    Lipase 32 11 - 82 U/L   URINALYSIS    Specimen: Urine, Mccabe Cath   Result Value  Ref Range    Color DK Yellow     Character Clear     Specific Gravity 1.028 <1.035    Ph 6.0 5.0 - 8.0    Glucose Negative Negative mg/dL    Ketones 15 (A) Negative mg/dL    Protein 30 (A) Negative mg/dL    Bilirubin Negative Negative    Urobilinogen, Urine 1.0 Negative    Nitrite Negative Negative    Leukocyte Esterase Small (A) Negative    Occult Blood Moderate (A) Negative    Micro Urine Req Microscopic    Lactic Acid   Result Value Ref Range    Lactic Acid 1.1 0.5 - 2.0 mmol/L   ESTIMATED GFR   Result Value Ref Range    GFR (CKD-EPI) 65 >60 mL/min/1.73 m 2   URINE MICROSCOPIC (W/UA)   Result Value Ref Range    WBC 10-20 (A) /hpf    RBC 20-50 (A) /hpf    Bacteria Negative None /hpf    Epithelial Cells Few /hpf    Epithelial Cells Renal Rare /hpf    Hyaline Cast 3-5 (A) /lpf         COURSE & MEDICAL DECISION MAKING     INITIAL ASSESSMENT, COURSE AND PLAN  Care Narrative:     1:50 AM - This is an emergency department evaluation of a 73 year old man who was diagnosed with a urinary tract infection yesterday also had prater placed for retention now here with suprapubic spasming pain and unable to get antibiotics. He also reports decreased appetite. Plan to give bentyl for suspected bladder spasm. Will give dose of keflex and discuss with pharmacy how we can get him antibiotics for home.  He will also get a dose of Tylenol. Labs ordered per protocol at triage. WBC normal. Creatinine normal. He is not vomiting and we can orally hydrate here.     3:46 AM - Spoke with pharmacy. The patient will be started on a dose of ceftriaxone, this will last 24 hours.     4:55 AM - The patient was reevaluated at bedside. Pain at bladder is resolved. He will be discharged home with Bentyl for at home management. The patient will follow up with Dr. Jurado. Discussed discharge instructions and return precautions with the patient and they were cleared for discharge. Patient was given the opportunity to ask any further questions. He is  comfortable with discharge at this time.       PROBLEM LIST  #Bladder spasm 2/2 prater    #UTI    DISPOSITION AND DISCUSSIONS  I have discussed management of the patient with the following physicians and KIM's:  None.    Discussion of management with other Q or appropriate source(s): None     Barriers to care at this time, including but not limited to:  None known .     The patient will return for new or worsening symptoms and is stable at the time of discharge.    DISPOSITION:  Patient will be discharged home in stable condition.    FOLLOW UP:  Buck Jurado M.D.  [address]  [address]  [place] [address]  [phone]    Schedule an appointment as soon as possible for a visit       OUTPATIENT MEDICATIONS:  Discharge Medication List as of 2/4/2024  5:00 AM        START taking these medications    Details   dicyclomine (BENTYL) 20 MG Tab Take 1 Tablet by mouth 2 times a day as needed (bladder spasm) for up to 7 days., Disp-14 Tablet, R-0, Normal            FINAL DIAGNOSIS  1. Acute UTI    2. Bladder spasms       ICarmen (Scribe), am scribing for, and in the presence of, CIRO Guidry II.    Electronically signed by: Carmen Lugo (Scribe), 2/4/2024    ITyler II, M* personally performed the services described in this documentation, as scribed by Carmen Lugo in my presence, and it is both accurate and complete.      The note accurately reflects work and decisions made by me.  Tyler Jackson II, M.D.  2/4/2024  8:05 AM

## 2024-02-04 NOTE — ED NOTES
Taken patient from triage waiting room, via wheelchair,  alert/ oriented x 4.Verified patient identification.  Assumed patient care.   Placed on patient room. Changed clothes to hospital gown. Connected to cardiac monitor.   Given the call light and instructed to call for any assistance needed/ or concerns.   Bed on lowest position, side rails up, breaks locked. Awaiting for ERP.

## 2024-02-05 ENCOUNTER — PHARMACY VISIT (OUTPATIENT)
Dept: PHARMACY | Facility: MEDICAL CENTER | Age: 74
End: 2024-02-05
Payer: COMMERCIAL

## 2024-02-05 ENCOUNTER — HOSPITAL ENCOUNTER (EMERGENCY)
Facility: MEDICAL CENTER | Age: 74
End: 2024-02-05
Attending: EMERGENCY MEDICINE
Payer: MEDICARE

## 2024-02-05 VITALS
RESPIRATION RATE: 18 BRPM | WEIGHT: 200 LBS | HEART RATE: 68 BPM | OXYGEN SATURATION: 95 % | HEIGHT: 72 IN | SYSTOLIC BLOOD PRESSURE: 129 MMHG | TEMPERATURE: 97.6 F | DIASTOLIC BLOOD PRESSURE: 79 MMHG | BODY MASS INDEX: 27.09 KG/M2

## 2024-02-05 DIAGNOSIS — N39.0 ACUTE UTI: ICD-10-CM

## 2024-02-05 LAB
BACTERIA UR CULT: NORMAL
SIGNIFICANT IND 70042: NORMAL
SITE SITE: NORMAL
SOURCE SOURCE: NORMAL

## 2024-02-05 PROCEDURE — 99284 EMERGENCY DEPT VISIT MOD MDM: CPT

## 2024-02-05 PROCEDURE — 700102 HCHG RX REV CODE 250 W/ 637 OVERRIDE(OP): Mod: UD | Performed by: EMERGENCY MEDICINE

## 2024-02-05 PROCEDURE — A9270 NON-COVERED ITEM OR SERVICE: HCPCS | Mod: UD | Performed by: EMERGENCY MEDICINE

## 2024-02-05 RX ORDER — ACETAMINOPHEN 325 MG/1
650 TABLET ORAL ONCE
Status: COMPLETED | OUTPATIENT
Start: 2024-02-05 | End: 2024-02-05

## 2024-02-05 RX ORDER — CEPHALEXIN 500 MG/1
500 CAPSULE ORAL ONCE
Status: COMPLETED | OUTPATIENT
Start: 2024-02-05 | End: 2024-02-05

## 2024-02-05 RX ADMIN — CEPHALEXIN 500 MG: 500 CAPSULE ORAL at 05:47

## 2024-02-05 RX ADMIN — ACETAMINOPHEN 650 MG: 325 TABLET, FILM COATED ORAL at 05:47

## 2024-02-05 ASSESSMENT — PAIN DESCRIPTION - PAIN TYPE: TYPE: ACUTE PAIN

## 2024-02-05 ASSESSMENT — FIBROSIS 4 INDEX: FIB4 SCORE: 1.07

## 2024-02-05 NOTE — DISCHARGE PLANNING
Medical Social Work    MSW received a call from ERP that pt has RX over at [facility] and was unable to pick them up and is back again today.  Pharmacy does not open until 0730 and we can do meds to beds.  ER  aware and will have day shift get pt's medications this morning.  ERP states that pt is having issues with his friend driving him around as needed.  MSW reviewed pt's chart and verified that pt does have [employer] benefits.  MSW provided pt with an [employer] flyer with his Medicaid ID number and that he has to call them at least 3 business days in advance for a scheduled appointment.  Pt reports understanding and states that he's used [employer] in the past; however, he states that he has a friend who takes him everywhere.  Bedside RN updated.

## 2024-02-05 NOTE — ED NOTES
Discussion had with case management. They will ensure medications are ready for pick-up and assist with retrieving and delivering medications to patient. Patient aware of POC.

## 2024-02-05 NOTE — ED NOTES
Pt discharged, all appropriate hospital equipment removed (IV, monitor, pulse ox, etc.). Pt left unit via wheelchair with RN assistance to ED lobby for transport home with [facility]. Patient understands process and has personal cell phone he will use. Personal belongings with pt when leaving unit. Pt given discharge instructions prior to leaving unit including where to  prescriptions and when to follow-up; verbalizes understanding. Pt informed to return to ED if symptoms worsen/return or altered status develop. Copy of discharge instructions signed and turned into DC basket and copy sent with pt. Patient left with appropriate prescriptions.

## 2024-02-05 NOTE — ED PROVIDER NOTES
ED Provider Note    CHIEF COMPLAINT  Chief Complaint   Patient presents with    Bladder Infection     Pt states he was seen at [facility] ED for abdominal pain yesterday and was diagnosed with bladder infection, prescribed bentyl but was unable to  medication.        EXTERNAL RECORDS REVIEWED  Inpatient Notes ER note from yesterday to 2/4/2024 reviewed as well as ER note from 2/2/2024 reviewed    HPI/ROS  LIMITATION TO HISTORY   Select: : None  OUTSIDE HISTORIAN(S):  None    Fausto Martin is a 73 y.o. male who presents to the emergency department primarily requesting medication to be filled.  Was seen here yesterday and identified of having bladder infection.  States that he was discharged and was unable to get prescription filled.  Now wanting to complete his antibiotic course comes back to the hospital for further assistance.  Denies any significant change since his evaluation yesterday.  He has had continueds bladder cramping.  No fever or chills.  No nausea or vomiting.    PAST MEDICAL HISTORY   has a past medical history of High cholesterol, Hypertension, and Neuropathy.    SURGICAL HISTORY  patient denies any surgical history    FAMILY HISTORY  History reviewed. No pertinent family history.    SOCIAL HISTORY  Social History     Tobacco Use    Smoking status: Never    Smokeless tobacco: Never   Vaping Use    Vaping Use: Never used   Substance and Sexual Activity    Alcohol use: Not Currently    Drug use: Never    Sexual activity: Not on file       CURRENT MEDICATIONS  Home Medications    **Home medications have not yet been reviewed for this encounter**         ALLERGIES  No Known Allergies    PHYSICAL EXAM  VITAL SIGNS: /79   Pulse 68   Temp 36.4 °C (97.6 °F) (Temporal)   Resp 18   Ht 1.829 m (6')   Wt 90.7 kg (200 lb)   SpO2 95%   BMI 27.12 kg/m²      Pulse ox interpretation: I interpret this pulse ox as normal.  Constitutional: Alert in no apparent distress.  HENT: No signs of trauma,  Bilateral external ears normal, Nose normal.   Eyes: Pupils are equal and reactive  Neck: Normal range of motion, No tenderness, Supple  Cardiovascular: Regular rate and rhythm, no murmurs.   Thorax & Lungs: Normal breath sounds, No respiratory distress, No wheezing, No chest tenderness.   Skin: Warm, Dry, No erythema, No rash.   Extremities: Intact distal pulses, No edema, No tenderness  Musculoskeletal: Good range of motion in all major joints. No tenderness to palpation or major deformities noted.   Neurologic: Alert , Normal motor function, Normal sensory function, No focal deficits noted.   Psychiatric: Affect normal, Judgment normal, Mood normal.         DIAGNOSTIC STUDIES / PROCEDURES      LABS  Results for orders placed or performed during the hospital encounter of 02/04/24   CBC WITH DIFFERENTIAL   Result Value Ref Range    WBC 7.9 4.8 - 10.8 K/uL    RBC 4.46 (L) 4.70 - 6.10 M/uL    Hemoglobin 14.6 14.0 - 18.0 g/dL    Hematocrit 41.4 (L) 42.0 - 52.0 %    MCV 92.8 81.4 - 97.8 fL    MCH 32.7 27.0 - 33.0 pg    MCHC 35.3 32.3 - 36.5 g/dL    RDW 45.3 35.9 - 50.0 fL    Platelet Count 248 164 - 446 K/uL    MPV 10.2 9.0 - 12.9 fL    Neutrophils-Polys 73.40 (H) 44.00 - 72.00 %    Lymphocytes 12.80 (L) 22.00 - 41.00 %    Monocytes 9.00 0.00 - 13.40 %    Eosinophils 3.70 0.00 - 6.90 %    Basophils 0.50 0.00 - 1.80 %    Immature Granulocytes 0.60 0.00 - 0.90 %    Nucleated RBC 0.00 0.00 - 0.20 /100 WBC    Neutrophils (Absolute) 5.82 1.82 - 7.42 K/uL    Lymphs (Absolute) 1.01 1.00 - 4.80 K/uL    Monos (Absolute) 0.71 0.00 - 0.85 K/uL    Eos (Absolute) 0.29 0.00 - 0.51 K/uL    Baso (Absolute) 0.04 0.00 - 0.12 K/uL    Immature Granulocytes (abs) 0.05 0.00 - 0.11 K/uL    NRBC (Absolute) 0.00 K/uL   COMP METABOLIC PANEL   Result Value Ref Range    Sodium 135 135 - 145 mmol/L    Potassium 3.7 3.6 - 5.5 mmol/L    Chloride 102 96 - 112 mmol/L    Co2 19 (L) 20 - 33 mmol/L    Anion Gap 14.0 7.0 - 16.0    Glucose 137 (H) 65 - 99  mg/dL    Bun 21 8 - 22 mg/dL    Creatinine 1.17 0.50 - 1.40 mg/dL    Calcium 8.8 8.5 - 10.5 mg/dL    Correct Calcium 8.9 8.5 - 10.5 mg/dL    AST(SGOT) 20 12 - 45 U/L    ALT(SGPT) 30 2 - 50 U/L    Alkaline Phosphatase 72 30 - 99 U/L    Total Bilirubin 1.7 (H) 0.1 - 1.5 mg/dL    Albumin 3.9 3.2 - 4.9 g/dL    Total Protein 6.8 6.0 - 8.2 g/dL    Globulin 2.9 1.9 - 3.5 g/dL    A-G Ratio 1.3 g/dL   LIPASE   Result Value Ref Range    Lipase 32 11 - 82 U/L   URINALYSIS    Specimen: Urine, Mccabe Cath   Result Value Ref Range    Color DK Yellow     Character Clear     Specific Gravity 1.028 <1.035    Ph 6.0 5.0 - 8.0    Glucose Negative Negative mg/dL    Ketones 15 (A) Negative mg/dL    Protein 30 (A) Negative mg/dL    Bilirubin Negative Negative    Urobilinogen, Urine 1.0 Negative    Nitrite Negative Negative    Leukocyte Esterase Small (A) Negative    Occult Blood Moderate (A) Negative    Micro Urine Req Microscopic    Lactic Acid   Result Value Ref Range    Lactic Acid 1.1 0.5 - 2.0 mmol/L   ESTIMATED GFR   Result Value Ref Range    GFR (CKD-EPI) 65 >60 mL/min/1.73 m 2   URINE MICROSCOPIC (W/UA)   Result Value Ref Range    WBC 10-20 (A) /hpf    RBC 20-50 (A) /hpf    Bacteria Negative None /hpf    Epithelial Cells Few /hpf    Epithelial Cells Renal Rare /hpf    Hyaline Cast 3-5 (A) /lpf     Reviewed from prior      COURSE & MEDICAL DECISION MAKING    ED Observation Status? No; Patient does not meet criteria for ED Observation.     INITIAL ASSESSMENT, COURSE AND PLAN  Care Narrative: 73-year-old primarily here for medication.  Will be provided with a left heart dose here and medications will be refilled with assistance of case management/social work.  No acute worsening requiring recurrent ER workup    DISPOSITION AND DISCUSSIONS  I have discussed management of the patient with the following physicians and KIM's: None    Discussion of management with other QHP or appropriate source(s): None     73-year-old male presenting to  the emerged part with above presentation.  Medication has been provided here in addition to his medication as prescribed earlier this week.  Again patient does not have any acute worsening and can continue with his outpatient plan.    FINAL DIAGNOSIS  1. Acute UTI           Electronically signed by: Jonathan Martinez M.D., 2/5/2024 5:33 AM

## 2024-02-05 NOTE — ED TRIAGE NOTES
Chief Complaint   Patient presents with    Bladder Infection     Pt states he was seen at [facility] ED for abdominal pain yesterday and was diagnosed with bladder infection, prescribed bentyl but was unable to  medication.      Pt BIB EMS escorted to triage via WC for above complaint. Pt rates his pain 8/10, has prater in place.      Pt is alert/oriented and follows commands. Pt speaking in full sentences and responds appropriately to questions. No acute distress noted in triage and respirations are even and unlabored.     Pt placed in lobby and educated on triage process. Pt encouraged to alert staff for any changes in condition.

## 2024-02-05 NOTE — ED NOTES
Bedside report received from OTTONIEL Alcazar. Assumed patient care. Verified patient identification. Patient resting in bed, connected to monitor, respirations even and unlabored. Patient currently on RA,  Vital signs is stable. Discussed plan of care. Patient denied any new complaints. Standard safety precautions in place. Gurney in low position, side rail up for pt safety. Call light within reach.

## 2024-02-09 ENCOUNTER — HOSPITAL ENCOUNTER (EMERGENCY)
Facility: MEDICAL CENTER | Age: 74
End: 2024-02-09
Attending: EMERGENCY MEDICINE
Payer: MEDICARE

## 2024-02-09 VITALS
HEART RATE: 64 BPM | RESPIRATION RATE: 16 BRPM | SYSTOLIC BLOOD PRESSURE: 139 MMHG | DIASTOLIC BLOOD PRESSURE: 85 MMHG | OXYGEN SATURATION: 96 % | BODY MASS INDEX: 27.09 KG/M2 | WEIGHT: 200 LBS | TEMPERATURE: 97.5 F | HEIGHT: 72 IN

## 2024-02-09 DIAGNOSIS — R10.30 LOWER ABDOMINAL PAIN: ICD-10-CM

## 2024-02-09 LAB
APPEARANCE UR: CLEAR
BACTERIA #/AREA URNS HPF: NEGATIVE /HPF
BILIRUB UR QL STRIP.AUTO: NEGATIVE
COLOR UR: YELLOW
EPI CELLS #/AREA URNS HPF: NEGATIVE /HPF
GLUCOSE UR STRIP.AUTO-MCNC: NEGATIVE MG/DL
HYALINE CASTS #/AREA URNS LPF: ABNORMAL /LPF
KETONES UR STRIP.AUTO-MCNC: NEGATIVE MG/DL
LEUKOCYTE ESTERASE UR QL STRIP.AUTO: NEGATIVE
MICRO URNS: ABNORMAL
NITRITE UR QL STRIP.AUTO: NEGATIVE
PH UR STRIP.AUTO: 7 [PH] (ref 5–8)
PROT UR QL STRIP: NEGATIVE MG/DL
RBC # URNS HPF: ABNORMAL /HPF
RBC UR QL AUTO: ABNORMAL
SP GR UR STRIP.AUTO: 1.02
UROBILINOGEN UR STRIP.AUTO-MCNC: 0.2 MG/DL
WBC #/AREA URNS HPF: ABNORMAL /HPF

## 2024-02-09 PROCEDURE — 87086 URINE CULTURE/COLONY COUNT: CPT

## 2024-02-09 PROCEDURE — A9270 NON-COVERED ITEM OR SERVICE: HCPCS | Mod: UD | Performed by: EMERGENCY MEDICINE

## 2024-02-09 PROCEDURE — 81001 URINALYSIS AUTO W/SCOPE: CPT

## 2024-02-09 PROCEDURE — 51798 US URINE CAPACITY MEASURE: CPT

## 2024-02-09 PROCEDURE — 700102 HCHG RX REV CODE 250 W/ 637 OVERRIDE(OP): Mod: UD | Performed by: EMERGENCY MEDICINE

## 2024-02-09 PROCEDURE — 99285 EMERGENCY DEPT VISIT HI MDM: CPT

## 2024-02-09 RX ORDER — ACETAMINOPHEN 325 MG/1
650 TABLET ORAL ONCE
Status: COMPLETED | OUTPATIENT
Start: 2024-02-09 | End: 2024-02-09

## 2024-02-09 RX ADMIN — ACETAMINOPHEN 650 MG: 325 TABLET, FILM COATED ORAL at 09:11

## 2024-02-09 ASSESSMENT — PAIN DESCRIPTION - PAIN TYPE: TYPE: ACUTE PAIN

## 2024-02-09 ASSESSMENT — FIBROSIS 4 INDEX: FIB4 SCORE: 1.07

## 2024-02-09 NOTE — ED TRIAGE NOTES
.  Chief Complaint   Patient presents with    Urinary Catheter Problem    Abdominal Pain     Urinary catheter placed 4 days ago pt has follow up next Friday with urology. Reports woke this am with bladder pain. Urine cloudy in catheter with sediment.

## 2024-02-09 NOTE — ED PROVIDER NOTES
ER Provider Note    Scribed for Buck Diaz M.D. by Gisselle Allen. 2/9/2024   7:45 AM    Primary Care Provider: ANTHONY MCCLURE M.D.    CHIEF COMPLAINT  Chief Complaint   Patient presents with    Urinary Catheter Problem    Abdominal Pain     EXTERNAL RECORDS REVIEWED  Outpatient Notes: He was seen here 5 days ago for UTI. He had a prater catheter placed for urinary retention. He was given a prescription for Keflex on 2/5/24.    HPI/ROS  LIMITATION TO HISTORY   Select: : None  OUTSIDE HISTORIAN(S):  None noted    Fausto Martin is a 73 y.o. male who presents to the ED for evaluation of bladder pain onset this morning when he woke up. Per patient, he had a urinary catheter placed 4 days ago and has an appointment next week to follow up with urology. He denies any fever.     PAST MEDICAL HISTORY  Past Medical History:   Diagnosis Date    High cholesterol     Hypertension     Neuropathy        SURGICAL HISTORY  History reviewed. No pertinent surgical history.    FAMILY HISTORY  None noted    SOCIAL HISTORY   reports that he has never smoked. He has never used smokeless tobacco. He reports that he does not currently use alcohol. He reports that he does not use drugs.    CURRENT MEDICATIONS  Previous Medications    ACETAMINOPHEN (ACETAMIN PO)    Take 3 Tablets by mouth 1 time a day as needed (mild pain).    AMLODIPINE (NORVASC) 5 MG TAB    Take 5 mg by mouth every day.    ATORVASTATIN (LIPITOR) 20 MG TAB    Take 20 mg by mouth every evening.    DICYCLOMINE (BENTYL) 20 MG TAB    Take 1 Tablet by mouth 2 times a day as needed (bladder spasm) for up to 7 days.    GABAPENTIN (NEURONTIN) 100 MG CAP    Take 100 mg by mouth 3 times a day as needed (pain).    LIDOCAINE (LIDODERM) 5 % PATCH    Place 1 Patch on the skin every 24 hours.    LINACLOTIDE (LINZESS) 145 MCG CAP    Take 1 Capsule by mouth every morning.    LISINOPRIL (PRINIVIL) 40 MG TABLET    Take 40 mg by mouth every day.    TAMSULOSIN (FLOMAX) 0.4 MG CAPSULE     Take 1 Capsule by mouth 1/2 hour after breakfast.       ALLERGIES  No Known Allergies     PHYSICAL EXAM  /78   Pulse 79   Temp 36.7 °C (98 °F) (Temporal)   Resp 16   Ht 1.829 m (6')   Wt 90.7 kg (200 lb)   SpO2 93%   BMI 27.12 kg/m²    Nursing note and vitals reviewed.  Constitutional: Well-developed and well-nourished. No distress.   HENT: Head is normocephalic and atraumatic. Oropharynx is clear and moist without exudate or erythema.   Eyes: Pupils are equal, round, and reactive to light. Conjunctiva are normal.   Cardiovascular: Normal rate and regular rhythm. No murmur heard. Normal radial pulses.  Pulmonary/Chest: Breath sounds normal. No wheezes or rales.   Abdominal: Soft and non-tender. Mild suprapubic tenderness, no bladder distension, catheter is draining yellow slightly cloudy urine, No CVA tenderness   Musculoskeletal: Extremities exhibit normal range of motion without edema or tenderness.   Neurological: Awake, alert and oriented to person, place, and time. No focal deficits noted.  Skin: Skin is warm and dry. No rash.   Psychiatric: Normal mood and affect. Appropriate for clinical situation    DIAGNOSTIC STUDIES    Labs:   Results for orders placed or performed during the hospital encounter of 02/09/24   URINALYSIS    Specimen: Urine, Mccabe Cath   Result Value Ref Range    Color Yellow     Character Clear     Specific Gravity 1.022 <1.035    Ph 7.0 5.0 - 8.0    Glucose Negative Negative mg/dL    Ketones Negative Negative mg/dL    Protein Negative Negative mg/dL    Bilirubin Negative Negative    Urobilinogen, Urine 0.2 Negative    Nitrite Negative Negative    Leukocyte Esterase Negative Negative    Occult Blood Trace (A) Negative    Micro Urine Req Microscopic    URINE MICROSCOPIC (W/UA)   Result Value Ref Range    WBC 0-2 (A) /hpf    RBC 2-5 (A) /hpf    Bacteria Negative None /hpf    Epithelial Cells Negative /hpf    Hyaline Cast 0-2 /lpf     INITIAL ASSESSMENT AND PLAN    7:45 AM -  Patient was evaluated at bedside for urinary symptoms. Ordered for UA and UA Culture to evaluate. Patient verbalizes understanding and support with my plan of care.  Differential diagnoses include but not limited to: Prater catheter dysfunction and UTI. Will obtain bladder scan to confirm prater is draining appropriately. Urinalysis for infection.     ED Observation Status? No; Patient does not meet criteria for ED Observation.      COURSE AND MEDICAL DECISION MAKING  9:03 AM - Patient was updated on laboratory studies as detailed above. Bladder scan shows 17 ml. No signs of infection at this time. He was informed of the plan for discharge home. He will follow up with urology as scheduled. Patient verbalizes understanding and agreement to this plan of care.       DISPOSITION AND DISCUSSIONS    Escalation of care considered, and ultimately not performed: acute inpatient care management, however at this time, the patient is most appropriate for outpatient management.    Decision tools and prescription drugs considered including, but not limited to: Antibiotics I considered prescribing antibiotics, however I have not identified a bacterial source of infection.    The patient will return for new or worsening symptoms and is stable at the time of discharge.    DISPOSITION:  Patient will be discharged home in stable condition.    FOLLOW UP:  Charmaine Fink M.D.  [address] [facility] [id number] [phone]    Schedule an appointment as soon as possible for a visit       [facility], Emergency Dept  [address] [place] [id number] [phone]    If symptoms worsen    FINAL DIAGNOSIS  1. Lower abdominal pain         Gisselle LOPEZ), am scribing for, and in the presence of, Buck Diaz M.D..    Electronically signed by: Gisselle Burrows), 2/9/2024    Buck LOPEZ M.D. personally performed the services described in this documentation, as scribed by Gisselle Allen in my  presence, and it is both accurate and complete.      The note accurately reflects work and decisions made by me.  Buck Diaz M.D.  2/9/2024  12:59 PM

## 2024-02-09 NOTE — ED NOTES
Discharged to lobby by w/c. Pt understands to follow up with pcp and urology as scheduled. Pt calling [facility] for transportation home.

## 2024-02-11 LAB
BACTERIA UR CULT: NORMAL
SIGNIFICANT IND 70042: NORMAL
SITE SITE: NORMAL
SOURCE SOURCE: NORMAL

## 2024-02-28 ENCOUNTER — HOSPITAL ENCOUNTER (OUTPATIENT)
Facility: MEDICAL CENTER | Age: 74
DRG: 699 | End: 2024-02-28
Attending: UROLOGY
Payer: MEDICARE

## 2024-02-28 PROCEDURE — 87077 CULTURE AEROBIC IDENTIFY: CPT

## 2024-02-28 PROCEDURE — 87086 URINE CULTURE/COLONY COUNT: CPT

## 2024-02-29 ENCOUNTER — HOSPITAL ENCOUNTER (INPATIENT)
Facility: MEDICAL CENTER | Age: 74
LOS: 5 days | DRG: 699 | End: 2024-03-06
Attending: EMERGENCY MEDICINE | Admitting: INTERNAL MEDICINE
Payer: MEDICARE

## 2024-02-29 ENCOUNTER — APPOINTMENT (OUTPATIENT)
Dept: RADIOLOGY | Facility: MEDICAL CENTER | Age: 74
DRG: 699 | End: 2024-02-29
Attending: EMERGENCY MEDICINE
Payer: MEDICARE

## 2024-02-29 DIAGNOSIS — N39.0 ACUTE UTI: ICD-10-CM

## 2024-02-29 DIAGNOSIS — M10.9 ACUTE GOUT OF LEFT KNEE, UNSPECIFIED CAUSE: ICD-10-CM

## 2024-02-29 PROBLEM — E80.6 BILIRUBINEMIA: Status: ACTIVE | Noted: 2024-02-29

## 2024-02-29 PROBLEM — R53.1 GENERALIZED WEAKNESS: Status: ACTIVE | Noted: 2024-02-29

## 2024-02-29 PROBLEM — I10 HYPERTENSION: Status: ACTIVE | Noted: 2024-02-29

## 2024-02-29 PROBLEM — D72.829 LEUKOCYTOSIS: Status: ACTIVE | Noted: 2024-02-29

## 2024-02-29 PROBLEM — G62.9 PERIPHERAL NEUROPATHY: Status: ACTIVE | Noted: 2024-02-29

## 2024-02-29 PROBLEM — K21.9 GERD (GASTROESOPHAGEAL REFLUX DISEASE): Status: ACTIVE | Noted: 2024-02-29

## 2024-02-29 LAB
ALBUMIN SERPL BCP-MCNC: 4 G/DL (ref 3.2–4.9)
ALBUMIN/GLOB SERPL: 1.4 G/DL
ALP SERPL-CCNC: 89 U/L (ref 30–99)
ALT SERPL-CCNC: 21 U/L (ref 2–50)
ANION GAP SERPL CALC-SCNC: 13 MMOL/L (ref 7–16)
APPEARANCE UR: ABNORMAL
AST SERPL-CCNC: 16 U/L (ref 12–45)
BACTERIA #/AREA URNS HPF: ABNORMAL /HPF
BASOPHILS # BLD AUTO: 0.3 % (ref 0–1.8)
BASOPHILS # BLD: 0.04 K/UL (ref 0–0.12)
BILIRUB SERPL-MCNC: 1.6 MG/DL (ref 0.1–1.5)
BILIRUB UR QL STRIP.AUTO: NEGATIVE
BUN SERPL-MCNC: 20 MG/DL (ref 8–22)
CALCIUM ALBUM COR SERPL-MCNC: 9 MG/DL (ref 8.5–10.5)
CALCIUM SERPL-MCNC: 9 MG/DL (ref 8.5–10.5)
CHLORIDE SERPL-SCNC: 106 MMOL/L (ref 96–112)
CK SERPL-CCNC: 43 U/L (ref 0–154)
CO2 SERPL-SCNC: 20 MMOL/L (ref 20–33)
COLOR UR: YELLOW
CREAT SERPL-MCNC: 1 MG/DL (ref 0.5–1.4)
EKG IMPRESSION: NORMAL
EOSINOPHIL # BLD AUTO: 0.17 K/UL (ref 0–0.51)
EOSINOPHIL NFR BLD: 1.4 % (ref 0–6.9)
EPI CELLS #/AREA URNS HPF: NEGATIVE /HPF
ERYTHROCYTE [DISTWIDTH] IN BLOOD BY AUTOMATED COUNT: 43.5 FL (ref 35.9–50)
GFR SERPLBLD CREATININE-BSD FMLA CKD-EPI: 79 ML/MIN/1.73 M 2
GLOBULIN SER CALC-MCNC: 2.9 G/DL (ref 1.9–3.5)
GLUCOSE SERPL-MCNC: 109 MG/DL (ref 65–99)
GLUCOSE UR STRIP.AUTO-MCNC: NEGATIVE MG/DL
HCT VFR BLD AUTO: 43.9 % (ref 42–52)
HGB BLD-MCNC: 15.4 G/DL (ref 14–18)
HYALINE CASTS #/AREA URNS LPF: ABNORMAL /LPF
IMM GRANULOCYTES # BLD AUTO: 0.04 K/UL (ref 0–0.11)
IMM GRANULOCYTES NFR BLD AUTO: 0.3 % (ref 0–0.9)
KETONES UR STRIP.AUTO-MCNC: ABNORMAL MG/DL
LACTATE SERPL-SCNC: 0.9 MMOL/L (ref 0.5–2)
LACTATE SERPL-SCNC: 1.3 MMOL/L (ref 0.5–2)
LEUKOCYTE ESTERASE UR QL STRIP.AUTO: ABNORMAL
LYMPHOCYTES # BLD AUTO: 0.77 K/UL (ref 1–4.8)
LYMPHOCYTES NFR BLD: 6.4 % (ref 22–41)
MCH RBC QN AUTO: 33 PG (ref 27–33)
MCHC RBC AUTO-ENTMCNC: 35.1 G/DL (ref 32.3–36.5)
MCV RBC AUTO: 94.2 FL (ref 81.4–97.8)
MICRO URNS: ABNORMAL
MONOCYTES # BLD AUTO: 0.87 K/UL (ref 0–0.85)
MONOCYTES NFR BLD AUTO: 7.3 % (ref 0–13.4)
NEUTROPHILS # BLD AUTO: 10.11 K/UL (ref 1.82–7.42)
NEUTROPHILS NFR BLD: 84.3 % (ref 44–72)
NITRITE UR QL STRIP.AUTO: NEGATIVE
NRBC # BLD AUTO: 0 K/UL
NRBC BLD-RTO: 0 /100 WBC (ref 0–0.2)
PH UR STRIP.AUTO: 7 [PH] (ref 5–8)
PHOSPHATE SERPL-MCNC: 3.4 MG/DL (ref 2.5–4.5)
PLATELET # BLD AUTO: 190 K/UL (ref 164–446)
PMV BLD AUTO: 10.6 FL (ref 9–12.9)
POTASSIUM SERPL-SCNC: 4.3 MMOL/L (ref 3.6–5.5)
PROT SERPL-MCNC: 6.9 G/DL (ref 6–8.2)
PROT UR QL STRIP: 100 MG/DL
RBC # BLD AUTO: 4.66 M/UL (ref 4.7–6.1)
RBC # URNS HPF: >150 /HPF
RBC UR QL AUTO: ABNORMAL
SODIUM SERPL-SCNC: 139 MMOL/L (ref 135–145)
SP GR UR STRIP.AUTO: 1.02
TSH SERPL DL<=0.005 MIU/L-ACNC: 1.93 UIU/ML (ref 0.38–5.33)
UROBILINOGEN UR STRIP.AUTO-MCNC: 0.2 MG/DL
WBC # BLD AUTO: 12 K/UL (ref 4.8–10.8)
WBC #/AREA URNS HPF: ABNORMAL /HPF

## 2024-02-29 PROCEDURE — 82550 ASSAY OF CK (CPK): CPT

## 2024-02-29 PROCEDURE — 84100 ASSAY OF PHOSPHORUS: CPT

## 2024-02-29 PROCEDURE — 99223 1ST HOSP IP/OBS HIGH 75: CPT | Performed by: INTERNAL MEDICINE

## 2024-02-29 PROCEDURE — 71045 X-RAY EXAM CHEST 1 VIEW: CPT

## 2024-02-29 PROCEDURE — 84443 ASSAY THYROID STIM HORMONE: CPT

## 2024-02-29 PROCEDURE — 700105 HCHG RX REV CODE 258: Mod: UD | Performed by: EMERGENCY MEDICINE

## 2024-02-29 PROCEDURE — 700105 HCHG RX REV CODE 258: Mod: UD | Performed by: INTERNAL MEDICINE

## 2024-02-29 PROCEDURE — 700111 HCHG RX REV CODE 636 W/ 250 OVERRIDE (IP): Performed by: INTERNAL MEDICINE

## 2024-02-29 PROCEDURE — A9270 NON-COVERED ITEM OR SERVICE: HCPCS | Mod: UD | Performed by: INTERNAL MEDICINE

## 2024-02-29 PROCEDURE — 93005 ELECTROCARDIOGRAM TRACING: CPT | Performed by: INTERNAL MEDICINE

## 2024-02-29 PROCEDURE — 36415 COLL VENOUS BLD VENIPUNCTURE: CPT

## 2024-02-29 PROCEDURE — 81001 URINALYSIS AUTO W/SCOPE: CPT

## 2024-02-29 PROCEDURE — 87186 SC STD MICRODIL/AGAR DIL: CPT

## 2024-02-29 PROCEDURE — 83605 ASSAY OF LACTIC ACID: CPT

## 2024-02-29 PROCEDURE — 93010 ELECTROCARDIOGRAM REPORT: CPT | Performed by: INTERNAL MEDICINE

## 2024-02-29 PROCEDURE — 99285 EMERGENCY DEPT VISIT HI MDM: CPT

## 2024-02-29 PROCEDURE — 80053 COMPREHEN METABOLIC PANEL: CPT

## 2024-02-29 PROCEDURE — 700111 HCHG RX REV CODE 636 W/ 250 OVERRIDE (IP): Performed by: EMERGENCY MEDICINE

## 2024-02-29 PROCEDURE — 87040 BLOOD CULTURE FOR BACTERIA: CPT | Mod: 91

## 2024-02-29 PROCEDURE — 74176 CT ABD & PELVIS W/O CONTRAST: CPT

## 2024-02-29 PROCEDURE — 96375 TX/PRO/DX INJ NEW DRUG ADDON: CPT

## 2024-02-29 PROCEDURE — 87086 URINE CULTURE/COLONY COUNT: CPT

## 2024-02-29 PROCEDURE — 96374 THER/PROPH/DIAG INJ IV PUSH: CPT

## 2024-02-29 PROCEDURE — 96372 THER/PROPH/DIAG INJ SC/IM: CPT | Mod: XU

## 2024-02-29 PROCEDURE — 87077 CULTURE AEROBIC IDENTIFY: CPT

## 2024-02-29 PROCEDURE — G0378 HOSPITAL OBSERVATION PER HR: HCPCS

## 2024-02-29 PROCEDURE — 700102 HCHG RX REV CODE 250 W/ 637 OVERRIDE(OP): Mod: UD | Performed by: INTERNAL MEDICINE

## 2024-02-29 PROCEDURE — 85025 COMPLETE CBC W/AUTO DIFF WBC: CPT

## 2024-02-29 RX ORDER — OXYCODONE HYDROCHLORIDE 5 MG/1
5 TABLET ORAL
Status: DISCONTINUED | OUTPATIENT
Start: 2024-02-29 | End: 2024-03-06 | Stop reason: HOSPADM

## 2024-02-29 RX ORDER — GABAPENTIN 300 MG/1
300 CAPSULE ORAL
Status: DISCONTINUED | OUTPATIENT
Start: 2024-02-29 | End: 2024-03-06 | Stop reason: HOSPADM

## 2024-02-29 RX ORDER — CEFAZOLIN 2 G/1
2 INJECTION, POWDER, FOR SOLUTION INTRAMUSCULAR; INTRAVENOUS ONCE
Status: COMPLETED | OUTPATIENT
Start: 2024-02-29 | End: 2024-02-29

## 2024-02-29 RX ORDER — LABETALOL HYDROCHLORIDE 5 MG/ML
10 INJECTION, SOLUTION INTRAVENOUS EVERY 4 HOURS PRN
Status: DISCONTINUED | OUTPATIENT
Start: 2024-02-29 | End: 2024-03-06 | Stop reason: HOSPADM

## 2024-02-29 RX ORDER — TAMSULOSIN HYDROCHLORIDE 0.4 MG/1
0.4 CAPSULE ORAL
Status: DISCONTINUED | OUTPATIENT
Start: 2024-03-01 | End: 2024-03-06 | Stop reason: HOSPADM

## 2024-02-29 RX ORDER — OXYCODONE HYDROCHLORIDE 5 MG/1
2.5 TABLET ORAL
Status: DISCONTINUED | OUTPATIENT
Start: 2024-02-29 | End: 2024-03-06 | Stop reason: HOSPADM

## 2024-02-29 RX ORDER — FAMOTIDINE 20 MG/1
20 TABLET, FILM COATED ORAL 2 TIMES DAILY
Status: DISCONTINUED | OUTPATIENT
Start: 2024-02-29 | End: 2024-03-06 | Stop reason: HOSPADM

## 2024-02-29 RX ORDER — AMOXICILLIN 250 MG
2 CAPSULE ORAL EVERY EVENING
Status: DISCONTINUED | OUTPATIENT
Start: 2024-02-29 | End: 2024-03-06 | Stop reason: HOSPADM

## 2024-02-29 RX ORDER — MORPHINE SULFATE 4 MG/ML
2 INJECTION INTRAVENOUS
Status: DISCONTINUED | OUTPATIENT
Start: 2024-02-29 | End: 2024-03-06 | Stop reason: HOSPADM

## 2024-02-29 RX ORDER — ACETAMINOPHEN 325 MG/1
650 TABLET ORAL EVERY 6 HOURS PRN
Status: DISCONTINUED | OUTPATIENT
Start: 2024-02-29 | End: 2024-03-06 | Stop reason: HOSPADM

## 2024-02-29 RX ORDER — CEPHALEXIN 500 MG/1
500 CAPSULE ORAL 2 TIMES DAILY
COMMUNITY
Start: 2024-02-21 | End: 2024-03-06

## 2024-02-29 RX ORDER — CALCIUM CARBONATE 500 MG/1
500 TABLET, CHEWABLE ORAL DAILY
Status: DISCONTINUED | OUTPATIENT
Start: 2024-02-29 | End: 2024-03-06 | Stop reason: HOSPADM

## 2024-02-29 RX ORDER — SODIUM CHLORIDE 9 MG/ML
1000 INJECTION, SOLUTION INTRAVENOUS ONCE
Status: COMPLETED | OUTPATIENT
Start: 2024-02-29 | End: 2024-02-29

## 2024-02-29 RX ORDER — ONDANSETRON 2 MG/ML
4 INJECTION INTRAMUSCULAR; INTRAVENOUS EVERY 4 HOURS PRN
Status: DISCONTINUED | OUTPATIENT
Start: 2024-02-29 | End: 2024-03-06 | Stop reason: HOSPADM

## 2024-02-29 RX ORDER — ENOXAPARIN SODIUM 100 MG/ML
40 INJECTION SUBCUTANEOUS DAILY
Status: DISCONTINUED | OUTPATIENT
Start: 2024-02-29 | End: 2024-03-06 | Stop reason: HOSPADM

## 2024-02-29 RX ORDER — SUCRALFATE 1 G/1
1 TABLET ORAL EVERY 6 HOURS
Qty: 4 TABLET | Refills: 0 | Status: COMPLETED | OUTPATIENT
Start: 2024-02-29 | End: 2024-03-01

## 2024-02-29 RX ORDER — POLYETHYLENE GLYCOL 3350 17 G/17G
1 POWDER, FOR SOLUTION ORAL
Status: DISCONTINUED | OUTPATIENT
Start: 2024-02-29 | End: 2024-03-06 | Stop reason: HOSPADM

## 2024-02-29 RX ORDER — SODIUM CHLORIDE 9 MG/ML
INJECTION, SOLUTION INTRAVENOUS CONTINUOUS
Status: DISCONTINUED | OUTPATIENT
Start: 2024-02-29 | End: 2024-03-04

## 2024-02-29 RX ORDER — ONDANSETRON 4 MG/1
4 TABLET, ORALLY DISINTEGRATING ORAL EVERY 4 HOURS PRN
Status: DISCONTINUED | OUTPATIENT
Start: 2024-02-29 | End: 2024-03-06 | Stop reason: HOSPADM

## 2024-02-29 RX ORDER — SULFAMETHOXAZOLE AND TRIMETHOPRIM 800; 160 MG/1; MG/1
1 TABLET ORAL 2 TIMES DAILY
Status: ON HOLD | COMMUNITY
Start: 2024-02-28 | End: 2024-03-06

## 2024-02-29 RX ADMIN — OXYCODONE 5 MG: 5 TABLET ORAL at 17:26

## 2024-02-29 RX ADMIN — DOCUSATE SODIUM 50 MG AND SENNOSIDES 8.6 MG 2 TABLET: 8.6; 5 TABLET, FILM COATED ORAL at 17:26

## 2024-02-29 RX ADMIN — CEFTRIAXONE SODIUM 1000 MG: 10 INJECTION, POWDER, FOR SOLUTION INTRAVENOUS at 17:40

## 2024-02-29 RX ADMIN — SODIUM CHLORIDE 1000 ML: 9 INJECTION, SOLUTION INTRAVENOUS at 14:36

## 2024-02-29 RX ADMIN — OXYCODONE 5 MG: 5 TABLET ORAL at 20:37

## 2024-02-29 RX ADMIN — ANTACID TABLETS 500 MG: 500 TABLET, CHEWABLE ORAL at 19:45

## 2024-02-29 RX ADMIN — SUCRALFATE 1 G: 1 TABLET ORAL at 19:20

## 2024-02-29 RX ADMIN — CEFAZOLIN 2 G: 2 INJECTION, POWDER, FOR SOLUTION INTRAMUSCULAR; INTRAVENOUS at 14:30

## 2024-02-29 RX ADMIN — FAMOTIDINE 20 MG: 20 TABLET, FILM COATED ORAL at 19:20

## 2024-02-29 RX ADMIN — ENOXAPARIN SODIUM 40 MG: 100 INJECTION SUBCUTANEOUS at 17:26

## 2024-02-29 RX ADMIN — SODIUM CHLORIDE: 9 INJECTION, SOLUTION INTRAVENOUS at 17:29

## 2024-02-29 ASSESSMENT — PATIENT HEALTH QUESTIONNAIRE - PHQ9
9. THOUGHTS THAT YOU WOULD BE BETTER OFF DEAD, OR OF HURTING YOURSELF: NOT AT ALL
4. FEELING TIRED OR HAVING LITTLE ENERGY: NOT AT ALL
3. TROUBLE FALLING OR STAYING ASLEEP OR SLEEPING TOO MUCH: NOT AT ALL
5. POOR APPETITE OR OVEREATING: NOT AT ALL
7. TROUBLE CONCENTRATING ON THINGS, SUCH AS READING THE NEWSPAPER OR WATCHING TELEVISION: NOT AT ALL
SUM OF ALL RESPONSES TO PHQ9 QUESTIONS 1 AND 2: 1
8. MOVING OR SPEAKING SO SLOWLY THAT OTHER PEOPLE COULD HAVE NOTICED. OR THE OPPOSITE, BEING SO FIGETY OR RESTLESS THAT YOU HAVE BEEN MOVING AROUND A LOT MORE THAN USUAL: NOT AT ALL
1. LITTLE INTEREST OR PLEASURE IN DOING THINGS: NOT AT ALL
2. FEELING DOWN, DEPRESSED, IRRITABLE, OR HOPELESS: SEVERAL DAYS
SUM OF ALL RESPONSES TO PHQ QUESTIONS 1-9: 1
6. FEELING BAD ABOUT YOURSELF - OR THAT YOU ARE A FAILURE OR HAVE LET YOURSELF OR YOUR FAMILY DOWN: NOT AL ALL

## 2024-02-29 ASSESSMENT — ENCOUNTER SYMPTOMS
TREMORS: 0
VOMITING: 0
PALPITATIONS: 0
FOCAL WEAKNESS: 0
FEVER: 0
HEMOPTYSIS: 0
FLANK PAIN: 0
COUGH: 0
SPEECH CHANGE: 0
WEAKNESS: 1
HEADACHES: 0
NAUSEA: 0
BACK PAIN: 0
DOUBLE VISION: 0
PHOTOPHOBIA: 0
BLURRED VISION: 0
HALLUCINATIONS: 0
NECK PAIN: 0
HEARTBURN: 1
WEIGHT LOSS: 0
CHILLS: 0
ORTHOPNEA: 0
SPUTUM PRODUCTION: 0
POLYDIPSIA: 0
NERVOUS/ANXIOUS: 0
BRUISES/BLEEDS EASILY: 0

## 2024-02-29 ASSESSMENT — LIFESTYLE VARIABLES
CONSUMPTION TOTAL: NEGATIVE
HOW MANY TIMES IN THE PAST YEAR HAVE YOU HAD 5 OR MORE DRINKS IN A DAY: 0
EVER HAD A DRINK FIRST THING IN THE MORNING TO STEADY YOUR NERVES TO GET RID OF A HANGOVER: NO
TOTAL SCORE: 0
HAVE YOU EVER FELT YOU SHOULD CUT DOWN ON YOUR DRINKING: NO
HAVE PEOPLE ANNOYED YOU BY CRITICIZING YOUR DRINKING: NO
EVER FELT BAD OR GUILTY ABOUT YOUR DRINKING: NO
SUBSTANCE_ABUSE: 0
TOTAL SCORE: 0
ALCOHOL_USE: NO
ON A TYPICAL DAY WHEN YOU DRINK ALCOHOL HOW MANY DRINKS DO YOU HAVE: 0
AVERAGE NUMBER OF DAYS PER WEEK YOU HAVE A DRINK CONTAINING ALCOHOL: 0
TOTAL SCORE: 0

## 2024-02-29 ASSESSMENT — PAIN DESCRIPTION - PAIN TYPE
TYPE: ACUTE PAIN
TYPE: ACUTE PAIN;CHRONIC PAIN
TYPE: ACUTE PAIN

## 2024-02-29 ASSESSMENT — FIBROSIS 4 INDEX
FIB4 SCORE: 1.34
FIB4 SCORE: 1.34

## 2024-02-29 NOTE — ED NOTES
Medication history reviewed with patient at bedside.   Med rec is complete  Allergies reviewed.   Patient was prescribed a 5 day course of keflex 500mg bid, this was finished on 2/26/24    Patient was also prescribed a 7 day course of bactrim ds bid on 2/28/24, last dose was 2/29/24 am.  Anticoagulants: No  Nitin Phillips

## 2024-02-29 NOTE — ED PROVIDER NOTES
ED Provider Note    CHIEF COMPLAINT  Chief Complaint   Patient presents with    UTI     Pt has a catheter, was just replaced yesterday sees a urologist. Has had UTI sx on & off x 1 month, recently put back on Bactrim by his urologist yesterday. [facility] was feeling [facility]. BP was 96/66, given 100mL LR by EMS. Pt very weak, difficulty walking even with walker.    Sent by MD LING/NATHAN    Fausto Martin is a 73 y.o. male who presents with hypotension.  The patient was recently diagnosed with a urinary tract infection per his urologist and his Mccabe catheter was changed yesterday.  He took his x-ray this morning is his first dose for the urinary tract infection.  The patient went to his primary care provider today and was found to be hypotensive and weak and therefore EMS was contacted.  The patient was hypotensive in the field and did receive 100 mL of lactated Ringer's per EMS.  The patient states he is so weak he is having hard time walking with his walker.  He does not have any focal weakness.  He does not have any nausea or vomiting.  He does have an indwelling Mccabe catheter secondary to urinary retention.  He has not had any associated fevers.  He states he does have some slight dyspnea.    PAST MEDICAL HISTORY   has a past medical history of High cholesterol, Hypertension, and Neuropathy.    SURGICAL HISTORY  patient denies any surgical history    FAMILY HISTORY  No family history on file.    SOCIAL HISTORY  Social History     Tobacco Use    Smoking status: Never    Smokeless tobacco: Never   Vaping Use    Vaping Use: Never used   Substance and Sexual Activity    Alcohol use: Not Currently    Drug use: Never    Sexual activity: Not on file       CURRENT MEDICATIONS  Home Medications       Reviewed by Madhuri Hurst R.N. (Registered Nurse) on 02/29/24 at 1134  Med List Status: Not Addressed     Medication Last Dose Status   Acetaminophen (ACETAMIN PO)  Active    amLODIPine (NORVASC) 5 MG Tab  Active   atorvastatin (LIPITOR) 20 MG Tab  Active   gabapentin (NEURONTIN) 100 MG Cap  Active   lidocaine (LIDODERM) 5 % Patch  Active   linaCLOtide (LINZESS) 145 MCG Cap  Active   lisinopril (PRINIVIL) 40 MG tablet  Active   tamsulosin (FLOMAX) 0.4 MG capsule  Active                    ALLERGIES  No Known Allergies    PHYSICAL EXAM  VITAL SIGNS: /70   Pulse 78   Temp 36.1 °C (97 °F) (Temporal)   Resp 20   SpO2 92%    In general the patient appears ill    HEENT unremarkable    Pulmonary the patient's lungs are symmetrically diminished throughout    Cardiovascular S1-S2 with a regular rate and rhythm    GI abdomen soft    Skin no pallor no jaundice    Extremities no edema    Neurologic examination is grossly intact    DIAGNOSTIC STUDIES   Results for orders placed or performed during the hospital encounter of 02/29/24   Lactic Acid   Result Value Ref Range    Lactic Acid 1.3 0.5 - 2.0 mmol/L   CBC with Differential   Result Value Ref Range    WBC 12.0 (H) 4.8 - 10.8 K/uL    RBC 4.66 (L) 4.70 - 6.10 M/uL    Hemoglobin 15.4 14.0 - 18.0 g/dL    Hematocrit 43.9 42.0 - 52.0 %    MCV 94.2 81.4 - 97.8 fL    MCH 33.0 27.0 - 33.0 pg    MCHC 35.1 32.3 - 36.5 g/dL    RDW 43.5 35.9 - 50.0 fL    Platelet Count 190 164 - 446 K/uL    MPV 10.6 9.0 - 12.9 fL    Neutrophils-Polys 84.30 (H) 44.00 - 72.00 %    Lymphocytes 6.40 (L) 22.00 - 41.00 %    Monocytes 7.30 0.00 - 13.40 %    Eosinophils 1.40 0.00 - 6.90 %    Basophils 0.30 0.00 - 1.80 %    Immature Granulocytes 0.30 0.00 - 0.90 %    Nucleated RBC 0.00 0.00 - 0.20 /100 WBC    Neutrophils (Absolute) 10.11 (H) 1.82 - 7.42 K/uL    Lymphs (Absolute) 0.77 (L) 1.00 - 4.80 K/uL    Monos (Absolute) 0.87 (H) 0.00 - 0.85 K/uL    Eos (Absolute) 0.17 0.00 - 0.51 K/uL    Baso (Absolute) 0.04 0.00 - 0.12 K/uL    Immature Granulocytes (abs) 0.04 0.00 - 0.11 K/uL    NRBC (Absolute) 0.00 K/uL   Complete Metabolic Panel   Result Value Ref Range    Sodium  139 135 - 145 mmol/L    Potassium 4.3 3.6 - 5.5 mmol/L    Chloride 106 96 - 112 mmol/L    Co2 20 20 - 33 mmol/L    Anion Gap 13.0 7.0 - 16.0    Glucose 109 (H) 65 - 99 mg/dL    Bun 20 8 - 22 mg/dL    Creatinine 1.00 0.50 - 1.40 mg/dL    Calcium 9.0 8.5 - 10.5 mg/dL    Correct Calcium 9.0 8.5 - 10.5 mg/dL    AST(SGOT) 16 12 - 45 U/L    ALT(SGPT) 21 2 - 50 U/L    Alkaline Phosphatase 89 30 - 99 U/L    Total Bilirubin 1.6 (H) 0.1 - 1.5 mg/dL    Albumin 4.0 3.2 - 4.9 g/dL    Total Protein 6.9 6.0 - 8.2 g/dL    Globulin 2.9 1.9 - 3.5 g/dL    A-G Ratio 1.4 g/dL   Urinalysis    Specimen: Urine   Result Value Ref Range    Color Yellow     Character Turbid (A)     Specific Gravity 1.020 <1.035    Ph 7.0 5.0 - 8.0    Glucose Negative Negative mg/dL    Ketones Trace (A) Negative mg/dL    Protein 100 (A) Negative mg/dL    Bilirubin Negative Negative    Urobilinogen, Urine 0.2 Negative    Nitrite Negative Negative    Leukocyte Esterase Moderate (A) Negative    Occult Blood Large (A) Negative    Micro Urine Req Microscopic    URINE MICROSCOPIC (W/UA)   Result Value Ref Range    WBC 10-20 (A) /hpf    RBC >150 (A) /hpf    Bacteria Few (A) None /hpf    Epithelial Cells Negative /hpf    Hyaline Cast 0-2 /lpf   ESTIMATED GFR   Result Value Ref Range    GFR (CKD-EPI) 79 >60 mL/min/1.73 m 2         RADIOLOGY    Radiologist interpretation:   CT-RENAL COLIC EVALUATION(A/P W/O)   Final Result      1. No urinary tract calculus identified. No renal collecting system dilatation.      2. No evidence of inflammatory change in the abdomen or pelvis. The study is limited due to nonuse of intravenous contrast.      3. Stable simple appearing right renal cortical cysts. No further imaging follow-up is warranted as per consensus guidelines based on imaging criteria.      4. Bladder decompressed by an indwelling Mccabe catheter.      DX-CHEST-PORTABLE (1 VIEW)   Final Result      No acute cardiac or pulmonary abnormalities are identified.             COURSE & MEDICAL DECISION MAKING  This is 73-year-old gentleman who presents to the emergency department acutely ill in appearance.  I suspect this is from a urinary tract infection and potentially early sepsis or bacteremia.  I was reluctant to give the patient too much fluid as that wont put the patient in fluid overload.  He received 100 mL of fluid prior to my exam and this was effective in increasing his blood pressure and subsequently gave him 1 L.  Laboratory analysis has been reviewed and the patient does not have a lactic acidosis nor acidosis.  Patient does feel significantly better after IV fluids and also administered Ancef.  The patient cannot care for himself due to his significant weakness and admit the patient to the hospital rule out bacteremia and early sepsis.  Did perform a CT scan to make sure there is no evidence of an obstructive process with the hematuria and this was negative.    The patient's blood pressure is 110 systolic at the time of admission.    Of note the catheter was changed yesterday.  .    FINAL DIAGNOSIS  1.  UTI  2.  Indwelling Mccabe catheter secondary to urinary retention  3.  Generalized weakness rule out bacteremia and early sepsis    Disposition  The patient will be admitted in stable condition       Electronically signed by: Issa Mckinney M.D., 2/29/2024 2:11 PM

## 2024-02-29 NOTE — ED TRIAGE NOTES
Fausto Martin  73 y.o.  male  Chief Complaint   Patient presents with    UTI     Pt has a catheter, was just replaced yesterday sees a urologist. Has had UTI sx on & off x 1 month, recently put back on Bactrim by his urologist yesterday. Went to [facility] today as he was feeling weak. BP was 96/66, given 100mL LR by EMS. Pt very weak, difficulty walking even with walker.    Sent by MD Lu BIB EMS from [facility].   Sepsis labs ordered. UA collected (new bag & new catheter placed yesterday). Patient educated on triage process, to alert staff if any changes in condition.

## 2024-02-29 NOTE — ED NOTES
Bedside report received from previous shift Nancy ALCAZAR  Assumed patient care. Verified patient identification.  Checked on bed, connected to monitor,  with unlabored respirations. Discussed plan of care.   Vital signs is stable. Denied any new complaints.   Gurney in low position, side rail up for pt safety. Call light within reach.   No needs identified at the moment. Instructed to use call light when needed.    Contraptions: gauge 18 Left FA  Alert and Oriented: yes  Ambulatory: yes  Oxygen: none  Pending: CT Scan

## 2024-02-29 NOTE — ED NOTES
Pt assisted to room via WC and able to transfer with with slow gait. X 1 assist. Chart up for erp.

## 2024-03-01 LAB
ALBUMIN SERPL BCP-MCNC: 3.2 G/DL (ref 3.2–4.9)
ALBUMIN/GLOB SERPL: 1.5 G/DL
ALP SERPL-CCNC: 74 U/L (ref 30–99)
ALT SERPL-CCNC: 17 U/L (ref 2–50)
ANION GAP SERPL CALC-SCNC: 9 MMOL/L (ref 7–16)
AST SERPL-CCNC: 14 U/L (ref 12–45)
BASOPHILS # BLD AUTO: 0.5 % (ref 0–1.8)
BASOPHILS # BLD: 0.04 K/UL (ref 0–0.12)
BILIRUB SERPL-MCNC: 1.1 MG/DL (ref 0.1–1.5)
BUN SERPL-MCNC: 21 MG/DL (ref 8–22)
CALCIUM ALBUM COR SERPL-MCNC: 8.7 MG/DL (ref 8.5–10.5)
CALCIUM SERPL-MCNC: 8.1 MG/DL (ref 8.5–10.5)
CHLORIDE SERPL-SCNC: 106 MMOL/L (ref 96–112)
CO2 SERPL-SCNC: 22 MMOL/L (ref 20–33)
CREAT SERPL-MCNC: 1.11 MG/DL (ref 0.5–1.4)
EOSINOPHIL # BLD AUTO: 0.27 K/UL (ref 0–0.51)
EOSINOPHIL NFR BLD: 3.5 % (ref 0–6.9)
ERYTHROCYTE [DISTWIDTH] IN BLOOD BY AUTOMATED COUNT: 45.1 FL (ref 35.9–50)
GFR SERPLBLD CREATININE-BSD FMLA CKD-EPI: 70 ML/MIN/1.73 M 2
GLOBULIN SER CALC-MCNC: 2.2 G/DL (ref 1.9–3.5)
GLUCOSE SERPL-MCNC: 101 MG/DL (ref 65–99)
HCT VFR BLD AUTO: 37.1 % (ref 42–52)
HGB BLD-MCNC: 12.7 G/DL (ref 14–18)
IMM GRANULOCYTES # BLD AUTO: 0.03 K/UL (ref 0–0.11)
IMM GRANULOCYTES NFR BLD AUTO: 0.4 % (ref 0–0.9)
LYMPHOCYTES # BLD AUTO: 0.99 K/UL (ref 1–4.8)
LYMPHOCYTES NFR BLD: 12.8 % (ref 22–41)
MCH RBC QN AUTO: 32.5 PG (ref 27–33)
MCHC RBC AUTO-ENTMCNC: 34.2 G/DL (ref 32.3–36.5)
MCV RBC AUTO: 94.9 FL (ref 81.4–97.8)
MONOCYTES # BLD AUTO: 0.75 K/UL (ref 0–0.85)
MONOCYTES NFR BLD AUTO: 9.7 % (ref 0–13.4)
NEUTROPHILS # BLD AUTO: 5.67 K/UL (ref 1.82–7.42)
NEUTROPHILS NFR BLD: 73.1 % (ref 44–72)
NRBC # BLD AUTO: 0 K/UL
NRBC BLD-RTO: 0 /100 WBC (ref 0–0.2)
PLATELET # BLD AUTO: 142 K/UL (ref 164–446)
PMV BLD AUTO: 10.8 FL (ref 9–12.9)
POTASSIUM SERPL-SCNC: 3.9 MMOL/L (ref 3.6–5.5)
PROT SERPL-MCNC: 5.4 G/DL (ref 6–8.2)
RBC # BLD AUTO: 3.91 M/UL (ref 4.7–6.1)
SODIUM SERPL-SCNC: 137 MMOL/L (ref 135–145)
WBC # BLD AUTO: 7.8 K/UL (ref 4.8–10.8)

## 2024-03-01 PROCEDURE — 700101 HCHG RX REV CODE 250: Mod: UD | Performed by: INTERNAL MEDICINE

## 2024-03-01 PROCEDURE — A9270 NON-COVERED ITEM OR SERVICE: HCPCS | Mod: UD | Performed by: INTERNAL MEDICINE

## 2024-03-01 PROCEDURE — 80053 COMPREHEN METABOLIC PANEL: CPT

## 2024-03-01 PROCEDURE — 36415 COLL VENOUS BLD VENIPUNCTURE: CPT

## 2024-03-01 PROCEDURE — 700102 HCHG RX REV CODE 250 W/ 637 OVERRIDE(OP): Mod: UD | Performed by: INTERNAL MEDICINE

## 2024-03-01 PROCEDURE — 85025 COMPLETE CBC W/AUTO DIFF WBC: CPT

## 2024-03-01 PROCEDURE — A9270 NON-COVERED ITEM OR SERVICE: HCPCS | Performed by: INTERNAL MEDICINE

## 2024-03-01 PROCEDURE — 97535 SELF CARE MNGMENT TRAINING: CPT

## 2024-03-01 PROCEDURE — 99233 SBSQ HOSP IP/OBS HIGH 50: CPT | Performed by: INTERNAL MEDICINE

## 2024-03-01 PROCEDURE — 770006 HCHG ROOM/CARE - MED/SURG/GYN SEMI*

## 2024-03-01 PROCEDURE — 96376 TX/PRO/DX INJ SAME DRUG ADON: CPT

## 2024-03-01 PROCEDURE — 700102 HCHG RX REV CODE 250 W/ 637 OVERRIDE(OP): Performed by: INTERNAL MEDICINE

## 2024-03-01 PROCEDURE — 700105 HCHG RX REV CODE 258: Performed by: INTERNAL MEDICINE

## 2024-03-01 PROCEDURE — 700111 HCHG RX REV CODE 636 W/ 250 OVERRIDE (IP): Mod: UD | Performed by: INTERNAL MEDICINE

## 2024-03-01 PROCEDURE — 97163 PT EVAL HIGH COMPLEX 45 MIN: CPT

## 2024-03-01 RX ORDER — QUETIAPINE FUMARATE 25 MG/1
25 TABLET, FILM COATED ORAL ONCE
Status: COMPLETED | OUTPATIENT
Start: 2024-03-02 | End: 2024-03-02

## 2024-03-01 RX ORDER — LISINOPRIL 20 MG/1
40 TABLET ORAL DAILY
Status: DISCONTINUED | OUTPATIENT
Start: 2024-03-01 | End: 2024-03-06 | Stop reason: HOSPADM

## 2024-03-01 RX ORDER — AMLODIPINE BESYLATE 5 MG/1
2.5 TABLET ORAL DAILY
Status: DISCONTINUED | OUTPATIENT
Start: 2024-03-01 | End: 2024-03-06 | Stop reason: HOSPADM

## 2024-03-01 RX ADMIN — ANTACID TABLETS 500 MG: 500 TABLET, CHEWABLE ORAL at 16:58

## 2024-03-01 RX ADMIN — SUCRALFATE 1 G: 1 TABLET ORAL at 13:54

## 2024-03-01 RX ADMIN — TAMSULOSIN HYDROCHLORIDE 0.4 MG: 0.4 CAPSULE ORAL at 08:36

## 2024-03-01 RX ADMIN — AMLODIPINE BESYLATE 2.5 MG: 5 TABLET ORAL at 11:58

## 2024-03-01 RX ADMIN — FAMOTIDINE 20 MG: 20 TABLET, FILM COATED ORAL at 05:03

## 2024-03-01 RX ADMIN — SODIUM CHLORIDE: 9 INJECTION, SOLUTION INTRAVENOUS at 23:54

## 2024-03-01 RX ADMIN — ACETAMINOPHEN 650 MG: 325 TABLET, FILM COATED ORAL at 15:49

## 2024-03-01 RX ADMIN — OXYCODONE 2.5 MG: 5 TABLET ORAL at 13:55

## 2024-03-01 RX ADMIN — LISINOPRIL 40 MG: 20 TABLET ORAL at 11:58

## 2024-03-01 RX ADMIN — SODIUM CHLORIDE: 9 INJECTION, SOLUTION INTRAVENOUS at 13:48

## 2024-03-01 RX ADMIN — ENOXAPARIN SODIUM 40 MG: 100 INJECTION SUBCUTANEOUS at 16:57

## 2024-03-01 RX ADMIN — OXYCODONE 5 MG: 5 TABLET ORAL at 16:58

## 2024-03-01 RX ADMIN — OXYCODONE 5 MG: 5 TABLET ORAL at 20:55

## 2024-03-01 RX ADMIN — OXYCODONE 5 MG: 5 TABLET ORAL at 08:37

## 2024-03-01 RX ADMIN — FAMOTIDINE 20 MG: 20 TABLET, FILM COATED ORAL at 16:58

## 2024-03-01 RX ADMIN — DOCUSATE SODIUM 50 MG AND SENNOSIDES 8.6 MG 2 TABLET: 8.6; 5 TABLET, FILM COATED ORAL at 16:58

## 2024-03-01 RX ADMIN — SUCRALFATE 1 G: 1 TABLET ORAL at 08:36

## 2024-03-01 RX ADMIN — SUCRALFATE 1 G: 1 TABLET ORAL at 01:04

## 2024-03-01 RX ADMIN — CEFTRIAXONE SODIUM 1000 MG: 10 INJECTION, POWDER, FOR SOLUTION INTRAVENOUS at 05:03

## 2024-03-01 RX ADMIN — OXYCODONE 5 MG: 5 TABLET ORAL at 01:04

## 2024-03-01 ASSESSMENT — ENCOUNTER SYMPTOMS
NAUSEA: 1
WEAKNESS: 1

## 2024-03-01 ASSESSMENT — COGNITIVE AND FUNCTIONAL STATUS - GENERAL
DAILY ACTIVITIY SCORE: 14
MOBILITY SCORE: 11
HELP NEEDED FOR BATHING: TOTAL
CLIMB 3 TO 5 STEPS WITH RAILING: TOTAL
SUGGESTED CMS G CODE MODIFIER DAILY ACTIVITY: CK
DRESSING REGULAR UPPER BODY CLOTHING: A LOT
CLIMB 3 TO 5 STEPS WITH RAILING: A LOT
WALKING IN HOSPITAL ROOM: A LITTLE
TOILETING: A LOT
PERSONAL GROOMING: A LITTLE
SUGGESTED CMS G CODE MODIFIER MOBILITY: CL
STANDING UP FROM CHAIR USING ARMS: A LITTLE
MOVING FROM LYING ON BACK TO SITTING ON SIDE OF FLAT BED: A LITTLE
MOBILITY SCORE: 17
MOVING TO AND FROM BED TO CHAIR: A LITTLE
MOVING FROM LYING ON BACK TO SITTING ON SIDE OF FLAT BED: A LOT
MOVING TO AND FROM BED TO CHAIR: A LOT
TURNING FROM BACK TO SIDE WHILE IN FLAT BAD: A LITTLE
STANDING UP FROM CHAIR USING ARMS: A LOT
TURNING FROM BACK TO SIDE WHILE IN FLAT BAD: A LITTLE
DRESSING REGULAR LOWER BODY CLOTHING: A LOT
SUGGESTED CMS G CODE MODIFIER MOBILITY: CK
WALKING IN HOSPITAL ROOM: TOTAL

## 2024-03-01 ASSESSMENT — PAIN SCALES - WONG BAKER
WONGBAKER_NUMERICALRESPONSE: HURTS A LITTLE MORE
WONGBAKER_NUMERICALRESPONSE: HURTS EVEN MORE

## 2024-03-01 ASSESSMENT — GAIT ASSESSMENTS
ASSISTIVE DEVICE: 4 WHEEL WALKER
GAIT LEVEL OF ASSIST: CONTACT GUARD ASSIST
DISTANCE (FEET): 75
DEVIATION: BRADYKINETIC

## 2024-03-01 ASSESSMENT — PAIN DESCRIPTION - PAIN TYPE
TYPE: CHRONIC PAIN;ACUTE PAIN
TYPE: ACUTE PAIN
TYPE: ACUTE PAIN;CHRONIC PAIN
TYPE: ACUTE PAIN

## 2024-03-01 NOTE — PROGRESS NOTES
4 Eyes Skin Assessment Completed by OTTONIEL Weber and TOTONIEL Parker.    Head WDL  Ears WDL  Nose WDL  Mouth WDL  Neck WDL  Breast/Chest WDL  Shoulder Blades WDL  Spine WDL  (R) Arm/Elbow/Hand WDL  (L) Arm/Elbow/Hand WDL  Abdomen WDL  Groin Mccabe in place, redness, blanching, CDI  Scrotum/Coccyx/Buttocks WDL  (R) Leg WDL  (L) Leg WDL  (R) Heel/Foot/Toe Dry  (L) Heel/Foot/Toe Dry          Devices In Places Pulse Ox and Mccabe      Interventions In Place Pillows    Possible Skin Injury No    Pictures Uploaded Into Epic N/A  Wound Consult Placed N/A  RN Wound Prevention Protocol Ordered No

## 2024-03-01 NOTE — H&P
Hospital Medicine History & Physical Note    Date of Service  2/29/2024    Primary Care Physician  ANTHONY MCCLURE M.D.        Code Status  Full Code    Chief Complaint  Chief Complaint   Patient presents with    UTI     Pt has a catheter, was just replaced yesterday sees a urologist. Has had UTI sx on & off x 1 month, recently put back on Bactrim by his urologist yesterday. Went to [facility] today as he was feeling weak. BP was 96/66, given 100mL LR by EMS. Pt very weak, difficulty walking even with walker.    Sent by MD       History of Presenting Illness  Fausto Martin is a 73 y.o. male with past medical history of chronic lower back pain, urinary retention, indwelling Mccabe catheter, intermittent constipation, recurrent UTIs, peripheral neuropathy, hypertension, dyslipidemia who presented 2/29/2024 with complaints of generalized weakness worsening today.  He states that he has bladder discomforts and spasms on and off as well.  Reported difficulties with standing up and ambulating  He was treated with Keflex prescribed on 2/5, and second time prescribed 2/21 through 2/26.  Yesterday he was seen at urology clinic when he was prescribed Bactrim for apparently persistent UTI and Mccabe catheter was exchanged.  He denies nausea vomiting or diarrhea, fever, chills, cough.  Reported that he has difficulty standing up and ambulate.  He also reports significant heartburn and acid reflux in the last 2 days  UA again is significant for moderate leukocyte esterase, WBC 10-20, RBC greater than 150, few bacteria, large occult blood.  CT of the abdomen and pelvis without contrast showed no acute abnormalities.  He was given 1 L of saline for presumed intravascular depletion and IV Ancef 2 g.    I discussed the plan of care with patient and ERP .    Review of Systems  Review of Systems   Constitutional:  Negative for chills, fever and weight loss.   HENT:  Negative for ear pain, hearing loss and tinnitus.     Eyes:  Negative for blurred vision, double vision and photophobia.   Respiratory:  Negative for cough, hemoptysis and sputum production.    Cardiovascular:  Negative for chest pain, palpitations and orthopnea.   Gastrointestinal:  Positive for heartburn. Negative for nausea and vomiting.   Genitourinary:  Negative for dysuria, flank pain, frequency and hematuria.        Bladder spasms   Musculoskeletal:  Negative for back pain and neck pain.   Skin:  Negative for itching and rash.   Neurological:  Positive for weakness. Negative for tremors, speech change, focal weakness and headaches.   Endo/Heme/Allergies:  Negative for environmental allergies and polydipsia. Does not bruise/bleed easily.   Psychiatric/Behavioral:  Negative for hallucinations and substance abuse. The patient is not nervous/anxious.        Past Medical History   has a past medical history of High cholesterol, Hypertension, and Neuropathy.    Surgical History   has no past surgical history on file.     Family History  family history is not on file.   Family history reviewed with patient. There is no family history that is pertinent to the chief complaint.     Social History   reports that he has never smoked. He has never used smokeless tobacco. He reports that he does not currently use alcohol. He reports that he does not use drugs.    Allergies  No Known Allergies    Medications  Prior to Admission Medications   Prescriptions Last Dose Informant Patient Reported? Taking?   Acetaminophen (ACETAMIN PO) 2/28/2024 at pm Patient Yes Yes   Sig: Take 2 Tablets by mouth 1 time a day as needed (mild pain).   amLODIPine (NORVASC) 5 MG Tab 2/27/2024 at stopped Patient Yes No   Sig: Take 2.5 mg by mouth every day. 1/2 tablet=2.5mg   atorvastatin (LIPITOR) 20 MG Tab 2/28/2024 at afternoon Patient Yes Yes   Sig: Take 20 mg by mouth every evening.   cephALEXin (KEFLEX) 500 MG Cap   Yes No   Sig: Take 500 mg by mouth 2 times a day.   gabapentin (NEURONTIN) 100  MG Cap 2/28/2024 at pm Patient Yes Yes   Sig: Take 300 mg by mouth 1 time a day as needed (pain). 3 capsules=300mg   linaCLOtide (LINZESS) 145 MCG Cap 2/28/2024 at am Patient Yes Yes   Sig: Take 1 Capsule by mouth every morning.   lisinopril (PRINIVIL) 40 MG tablet 2/28/2024 at am Patient Yes Yes   Sig: Take 40 mg by mouth every day.   sulfamethoxazole-trimethoprim (BACTRIM DS) 800-160 MG tablet 2/29/2024 at am Patient Yes Yes   Sig: Take 1 Tablet by mouth 2 times a day.   tamsulosin (FLOMAX) 0.4 MG capsule 2/29/2024 at am Patient No Yes   Sig: Take 1 Capsule by mouth 1/2 hour after breakfast.      Facility-Administered Medications: None       Physical Exam  Temp:  [36.1 °C (97 °F)] 36.1 °C (97 °F)  Pulse:  [72-91] 74  Resp:  [16-20] 19  BP: (106-121)/(67-79) 121/72  SpO2:  [92 %-95 %] 93 %  Blood Pressure : 121/72   Temperature: 36.1 °C (97 °F)   Pulse: 74   Respiration: 19   Pulse Oximetry: 93 %       Physical Exam  Vitals and nursing note reviewed.   Constitutional:       General: He is not in acute distress.     Appearance: He is ill-appearing (Chronically ill-appearing).   HENT:      Head: Normocephalic and atraumatic.      Nose: Nose normal.      Mouth/Throat:      Mouth: Mucous membranes are moist.   Eyes:      Extraocular Movements: Extraocular movements intact.      Pupils: Pupils are equal, round, and reactive to light.   Cardiovascular:      Rate and Rhythm: Normal rate and regular rhythm.   Pulmonary:      Effort: Pulmonary effort is normal.      Breath sounds: Normal breath sounds.   Abdominal:      General: Abdomen is flat. There is no distension.      Tenderness: There is abdominal tenderness in the suprapubic area. There is no guarding or rebound.   Musculoskeletal:         General: No swelling or deformity. Normal range of motion.      Cervical back: Normal range of motion and neck supple.   Skin:     General: Skin is warm and dry.   Neurological:      General: No focal deficit present.      Mental  "Status: He is alert and oriented to person, place, and time.   Psychiatric:         Mood and Affect: Mood normal.         Behavior: Behavior normal.         Laboratory:  Recent Labs     02/29/24  1147   WBC 12.0*   RBC 4.66*   HEMOGLOBIN 15.4   HEMATOCRIT 43.9   MCV 94.2   MCH 33.0   MCHC 35.1   RDW 43.5   PLATELETCT 190   MPV 10.6     Recent Labs     02/29/24  1147   SODIUM 139   POTASSIUM 4.3   CHLORIDE 106   CO2 20   GLUCOSE 109*   BUN 20   CREATININE 1.00   CALCIUM 9.0     Recent Labs     02/29/24  1147   ALTSGPT 21   ASTSGOT 16   ALKPHOSPHAT 89   TBILIRUBIN 1.6*   GLUCOSE 109*         No results for input(s): \"NTPROBNP\" in the last 72 hours.      No results for input(s): \"TROPONINT\" in the last 72 hours.    Imaging:  CT-RENAL COLIC EVALUATION(A/P W/O)   Final Result      1. No urinary tract calculus identified. No renal collecting system dilatation.      2. No evidence of inflammatory change in the abdomen or pelvis. The study is limited due to nonuse of intravenous contrast.      3. Stable simple appearing right renal cortical cysts. No further imaging follow-up is warranted as per consensus guidelines based on imaging criteria.      4. Bladder decompressed by an indwelling Mccabe catheter.      DX-CHEST-PORTABLE (1 VIEW)   Final Result      No acute cardiac or pulmonary abnormalities are identified.          Assessment/Plan:  Justification for Admission Status  I anticipate this patient is appropriate for observation status at this time because cystitis    Patient will need a Med/Surg bed on MEDICAL service .  The need is secondary to cystitis.    * UTI (urinary tract infection) catheter associated- (present on admission)  Assessment & Plan  73-year-old male with urine retention, indwelling Mccabe catheter, recurrent UTIs, presented with generalized weakness and intermittent bladder spasms,   He was treated with Keflex since the beginning of the month and started on Bactrim yesterday for persistent " symptoms.  UA showed leukocyte esterase, pyuria, few bacteria, mild leukocytosis WBC 12.0.  CT of the abdomen pelvis without contrast showed no obstruction.  Plan to treat with ceftriaxone  Urine culture  Follow-up with urology.    GERD (gastroesophageal reflux disease)  Assessment & Plan  In exacerbation  Ordered EKG, Pepcid, sucralfate and Tums    Peripheral neuropathy  Assessment & Plan  Continue gabapentin    Generalized weakness  Assessment & Plan  Plan to check TSH, CPK, phosphorus  PT OT evaluation, fall precautions    Hypertension  Assessment & Plan  Plan to hold amlodipine and lisinopril due to apparent intravascular depletion  Into her blood pressure  IV labetalol as needed    Bilirubinemia  Assessment & Plan  Total bilirubin 1.6.  Appearance mild chronic  Follow-up with PCP    Leukocytosis  Assessment & Plan  WBC 12.0  Follow-up with repeat CBC        VTE prophylaxis: enoxaparin ppx

## 2024-03-01 NOTE — ASSESSMENT & PLAN NOTE
Urine grew Enterococcus, complete course on Augmentin  Mccabe catheter was exchanged, will need to follow-up with urology clinic

## 2024-03-01 NOTE — PROGRESS NOTES
1330: Pt arrived to unit at 1130 via hospital bed.  VSS.  Assessment complete. No signs of distress noted at this time.  Pt reports 4/10 back and pelvic pain, will administer pain medications as per MAR.  Chronic prater catheter in place with a leg bag. Prater secured with stat lock. Minimal redness on penis observed from insertion site. Urine yellow. Fall precautions  and appropriate signs in place, bed alarm in place..  Pt educated regarding fall precautions   Belongings at bedside include : Pt oriented to unit routine, call light/phone system .  Pt provided water per request. Pt denies any additional needs at this time.  Call light within reach. Will continue to monitor.

## 2024-03-01 NOTE — PROGRESS NOTES
Received report from night RN and assumed care of patient along with OTTONIEL Smith. Pt awake, resting in bed, in no acute distress. No needs at this time, bed alarm is on, call light in reach.

## 2024-03-01 NOTE — PROGRESS NOTES
Assumed care of pt. Pt A&Ox4, NAD, VSS on RA. C/o 3 paain to bladder and lower back. Call light in reach. Bed in lowest position. Bed alarm in place. Care of plan discussed with pt with pt agreeing to care of plan. Communication board updated. All questions answered. Assessment completed.

## 2024-03-01 NOTE — PROGRESS NOTES
4 Eyes Skin Assessment Completed by OTTONIEL Foss and OTTONIEL Wong.    Head WDL  Ears Redness and Blanching, pt wears glasses  Nose Redness from glasses  Mouth WDL  Neck WDL  Breast/Chest WDL  Shoulder Blades WDL  Spine WDL  (R) Arm/Elbow/Hand WDL  (L) Arm/Elbow/Hand WDL  Abdomen WDL  Groin WDL  Scrotum/Coccyx/Buttocks WDL  Penis: Redness from prater catheter  (R) Leg WDL  (L) Leg WDL  (R) Heel/Foot/Toe WDL  (L) Heel/Foot/Toe WDL          Devices In Places Prater      Interventions In Place Pillows    Possible Skin Injury No    Pictures Uploaded Into Epic N/A  Wound Consult Placed N/A  RN Wound Prevention Protocol Ordered No

## 2024-03-01 NOTE — CARE PLAN
The patient is Stable - Low risk of patient condition declining or worsening    Shift Goals  Clinical Goals: PT/OT, prater care, IV abx  Patient Goals: get stronger  Family Goals: RADHA    Progress made toward(s) clinical / shift goals:    Problem: Knowledge Deficit - Standard  Goal: Patient and family/care givers will demonstrate understanding of plan of care, disease process/condition, diagnostic tests and medications  Outcome: Progressing     Problem: Fall Risk  Goal: Patient will remain free from falls  Outcome: Progressing     Problem: Pain - Standard  Goal: Alleviation of pain or a reduction in pain to the patient’s comfort goal  Outcome: Progressing     Problem: Infection - Standard  Goal: Patient will remain free from infection  Outcome: Progressing       Patient is not progressing towards the following goals:

## 2024-03-01 NOTE — CARE PLAN
The patient is Stable - Low risk of patient condition declining or worsening    Shift Goals  Clinical Goals: IV ABX, prater care, manage symptoms, pain control, IVF  Patient Goals: Comfort, rest  Family Goals: Not at bedside    Progress made toward(s) clinical / shift goals:      Problem: Knowledge Deficit - Standard  Goal: Patient and family/care givers will demonstrate understanding of plan of care, disease process/condition, diagnostic tests and medications  Outcome: Progressing    Problem: Fall Risk  Goal: Patient will remain free from falls  Outcome: Progressing  Problem: Pain - Standard  Goal: Alleviation of pain or a reduction in pain to the patient’s comfort goal  Outcome: Progressing     Problem: Infection - Standard  Goal: Patient will remain free from infection  Outcome: Progressing          Patient is not progressing towards the following goals:

## 2024-03-01 NOTE — PROGRESS NOTES
[facility] Medicine Daily Progress Note    Date of Service  3/1/2024    Chief Complaint  Fausto Martin is a 74 y.o. male admitted 2/29/2024 with weakness and bladder pain    Hospital Course  73 y.o. male with past medical history of chronic lower back pain, urinary retention, indwelling Mccabe catheter, intermittent constipation, recurrent UTIs, peripheral neuropathy, hypertension, dyslipidemia who presented 2/29/2024 with complaints of generalized weakness worsening today.  He states that he has bladder discomforts and spasms on and off as well.  Reported difficulties with standing up and ambulating  He was treated with Keflex prescribed on 2/5, and second time prescribed 2/21 through 2/26.  Yesterday he was seen at urology clinic when he was prescribed Bactrim for apparently persistent UTI and Mccabe catheter was exchanged.  He denies nausea vomiting or diarrhea, fever, chills, cough.  Reported that he has difficulty standing up and ambulate.  He also reports significant heartburn and acid reflux in the last 2 days  UA again is significant for moderate leukocyte esterase, WBC 10-20, RBC greater than 150, few bacteria, large occult blood.  CT of the abdomen and pelvis without contrast showed no acute abnormalities.  He was given 1 L of saline for presumed intravascular depletion and IV Ancef 2 g.    Interval Problem Update  Patient continues to feel poorly and has generalized weakness.  He also reports nausea.  His blood pressure has been uncontrolled at 156/85.  Urine culture is pending.  Given the patient has had recurrent UTIs in the past month and treated with Keflex and Bactrim I would like to continue IV ceftriaxone and wait for urine culture and sensitivity results.  I have ordered PT OT.  I will restart amlodipine and lisinopril.  IV labetalol as needed    I have discussed this patient's plan of care and discharge plan at IDT rounds today with Case Management, Nursing, Nursing leadership, and other  members of the IDT team.    Consultants/Specialty  none    Code Status  Full Code    Disposition  The patient is not medically cleared for discharge to home or a post-acute facility.      I have placed the appropriate orders for post-discharge needs.    Review of Systems  Review of Systems   Gastrointestinal:  Positive for nausea.   Genitourinary:  Positive for dysuria.   Neurological:  Positive for weakness.        Physical Exam  Temp:  [36.1 °C (97 °F)-37.7 °C (99.8 °F)] 36.4 °C (97.6 °F)  Pulse:  [69-91] 83  Resp:  [16-20] 18  BP: (106-156)/(67-85) 156/85  SpO2:  [92 %-95 %] 93 %    Physical Exam  Vitals and nursing note reviewed.   Constitutional:       General: He is not in acute distress.  HENT:      Head: Normocephalic.      Mouth/Throat:      Mouth: Mucous membranes are moist.   Eyes:      Pupils: Pupils are equal, round, and reactive to light.   Cardiovascular:      Rate and Rhythm: Normal rate and regular rhythm.      Pulses: Normal pulses.      Heart sounds: Normal heart sounds.   Pulmonary:      Effort: Pulmonary effort is normal.      Breath sounds: Normal breath sounds.   Abdominal:      Palpations: Abdomen is soft.      Tenderness: There is no abdominal tenderness.   Musculoskeletal:         General: No swelling.      Cervical back: Neck supple.   Skin:     General: Skin is warm.      Coloration: Skin is not jaundiced.   Neurological:      General: No focal deficit present.      Mental Status: He is alert and oriented to person, place, and time.   Psychiatric:         Mood and Affect: Mood normal.         Behavior: Behavior normal.         Fluids    Intake/Output Summary (Last 24 hours) at 3/1/2024 1112  Last data filed at 3/1/2024 0731  Gross per 24 hour   Intake --   Output 1090 ml   Net -1090 ml       Laboratory  Recent Labs     02/29/24  1147 03/01/24  0151   WBC 12.0* 7.8   RBC 4.66* 3.91*   HEMOGLOBIN 15.4 12.7*   HEMATOCRIT 43.9 37.1*   MCV 94.2 94.9   MCH 33.0 32.5   MCHC 35.1 34.2   RDW 43.5  45.1   PLATELETCT 190 142*   MPV 10.6 10.8     Recent Labs     02/29/24  1147 03/01/24  0151   SODIUM 139 137   POTASSIUM 4.3 3.9   CHLORIDE 106 106   CO2 20 22   GLUCOSE 109* 101*   BUN 20 21   CREATININE 1.00 1.11   CALCIUM 9.0 8.1*                   Imaging  CT-RENAL COLIC EVALUATION(A/P W/O)   Final Result      1. No urinary tract calculus identified. No renal collecting system dilatation.      2. No evidence of inflammatory change in the abdomen or pelvis. The study is limited due to nonuse of intravenous contrast.      3. Stable simple appearing right renal cortical cysts. No further imaging follow-up is warranted as per consensus guidelines based on imaging criteria.      4. Bladder decompressed by an indwelling Mccabe catheter.      DX-CHEST-PORTABLE (1 VIEW)   Final Result      No acute cardiac or pulmonary abnormalities are identified.           Assessment/Plan  * UTI (urinary tract infection) catheter associated- (present on admission)  Assessment & Plan  73-year-old male with urine retention, indwelling Mccabe catheter, recurrent UTIs, presented with generalized weakness and intermittent bladder spasms,   He was treated with Keflex since the beginning of the month and started on Bactrim yesterday for persistent symptoms.  UA showed leukocyte esterase, pyuria, few bacteria, mild leukocytosis WBC 12.0.  CT of the abdomen pelvis without contrast showed no obstruction.  Plan to treat with IV Ceftriaxone  Follow Urine culture  Follow-up with urology as outpatient  Mccabe was recently exchanged     GERD (gastroesophageal reflux disease)- (present on admission)  Assessment & Plan  In exacerbation  Ordered EKG, Pepcid, sucralfate and Tums    Peripheral neuropathy- (present on admission)  Assessment & Plan  Continue gabapentin    Generalized weakness- (present on admission)  Assessment & Plan  TSH, CPK, phosphorus WNL  Check Vit D and B12  PT OT evaluation, fall precautions    Hypertension- (present on  admission)  Assessment & Plan  Uncontrolled  Resume amlodipine and lisinopril   IV labetalol as needed    Bilirubinemia- (present on admission)  Assessment & Plan  Total bilirubin 1.6.  Appearance mild chronic  Follow-up with PCP    Leukocytosis- (present on admission)  Assessment & Plan  WBC 12.0  Follow-up with repeat CBC         VTE prophylaxis: lovenox    I have performed a physical exam and reviewed and updated ROS and Plan today (3/1/2024). In review of yesterday's note (2/29/2024), there are no changes except as documented above.    I spent 53 minutes, reviewing the chart, obtaining and/or reviewing separately obtained history. Performing a medically appropriate examination and evaluation.  Counseling and educating the patient. Ordering and reviewing medications, tests, or procedures. Documenting clinical information in EPIC. Independently interpreting results and communicating results to patient. Discussing future disposition of care with patient, RN and case management.

## 2024-03-01 NOTE — ASSESSMENT & PLAN NOTE
TSH, CPK, vitamin B-12 WNL  Vitamin D pending  Does have left knee pain and swelling concerning for gout flare and seems to be improving with colchicine, continue  PT OT to improve his strength to go home with home health

## 2024-03-01 NOTE — CARE PLAN
The patient is Stable - Low risk of patient condition declining or worsening         Progress made toward(s) clinical / shift goals:  Pt understands using the call light, as well as using accurate pain scales to report pain. Understands current hospitalization. Pt complains of no pain    Problem: Knowledge Deficit - Standard  Goal: Patient and family/care givers will demonstrate understanding of plan of care, disease process/condition, diagnostic tests and medications  Outcome: Progressing     Problem: Fall Risk  Goal: Patient will remain free from falls  Outcome: Progressing     Problem: Pain - Standard  Goal: Alleviation of pain or a reduction in pain to the patient’s comfort goal  Outcome: Progressing       Patient is not progressing towards the following goals:

## 2024-03-02 LAB
ANION GAP SERPL CALC-SCNC: 12 MMOL/L (ref 7–16)
BACTERIA UR CULT: ABNORMAL
BUN SERPL-MCNC: 14 MG/DL (ref 8–22)
CALCIUM SERPL-MCNC: 8.2 MG/DL (ref 8.5–10.5)
CHLORIDE SERPL-SCNC: 105 MMOL/L (ref 96–112)
CO2 SERPL-SCNC: 19 MMOL/L (ref 20–33)
CREAT SERPL-MCNC: 0.9 MG/DL (ref 0.5–1.4)
ERYTHROCYTE [DISTWIDTH] IN BLOOD BY AUTOMATED COUNT: 42 FL (ref 35.9–50)
GFR SERPLBLD CREATININE-BSD FMLA CKD-EPI: 90 ML/MIN/1.73 M 2
GLUCOSE SERPL-MCNC: 133 MG/DL (ref 65–99)
HCT VFR BLD AUTO: 35.1 % (ref 42–52)
HGB BLD-MCNC: 12.5 G/DL (ref 14–18)
MCH RBC QN AUTO: 32.6 PG (ref 27–33)
MCHC RBC AUTO-ENTMCNC: 35.6 G/DL (ref 32.3–36.5)
MCV RBC AUTO: 91.6 FL (ref 81.4–97.8)
PLATELET # BLD AUTO: 136 K/UL (ref 164–446)
PMV BLD AUTO: 10.6 FL (ref 9–12.9)
POTASSIUM SERPL-SCNC: 4 MMOL/L (ref 3.6–5.5)
RBC # BLD AUTO: 3.83 M/UL (ref 4.7–6.1)
SIGNIFICANT IND 70042: ABNORMAL
SIGNIFICANT IND 70042: ABNORMAL
SITE SITE: ABNORMAL
SITE SITE: ABNORMAL
SODIUM SERPL-SCNC: 136 MMOL/L (ref 135–145)
SOURCE SOURCE: ABNORMAL
SOURCE SOURCE: ABNORMAL
WBC # BLD AUTO: 10 K/UL (ref 4.8–10.8)

## 2024-03-02 PROCEDURE — 700102 HCHG RX REV CODE 250 W/ 637 OVERRIDE(OP)

## 2024-03-02 PROCEDURE — 700105 HCHG RX REV CODE 258: Performed by: INTERNAL MEDICINE

## 2024-03-02 PROCEDURE — A9270 NON-COVERED ITEM OR SERVICE: HCPCS

## 2024-03-02 PROCEDURE — 36415 COLL VENOUS BLD VENIPUNCTURE: CPT

## 2024-03-02 PROCEDURE — 700111 HCHG RX REV CODE 636 W/ 250 OVERRIDE (IP): Mod: JZ | Performed by: INTERNAL MEDICINE

## 2024-03-02 PROCEDURE — 700111 HCHG RX REV CODE 636 W/ 250 OVERRIDE (IP): Mod: JZ

## 2024-03-02 PROCEDURE — A9270 NON-COVERED ITEM OR SERVICE: HCPCS | Performed by: INTERNAL MEDICINE

## 2024-03-02 PROCEDURE — 700102 HCHG RX REV CODE 250 W/ 637 OVERRIDE(OP): Performed by: INTERNAL MEDICINE

## 2024-03-02 PROCEDURE — 80048 BASIC METABOLIC PNL TOTAL CA: CPT

## 2024-03-02 PROCEDURE — 770006 HCHG ROOM/CARE - MED/SURG/GYN SEMI*

## 2024-03-02 PROCEDURE — 700101 HCHG RX REV CODE 250: Performed by: INTERNAL MEDICINE

## 2024-03-02 PROCEDURE — 700102 HCHG RX REV CODE 250 W/ 637 OVERRIDE(OP): Performed by: STUDENT IN AN ORGANIZED HEALTH CARE EDUCATION/TRAINING PROGRAM

## 2024-03-02 PROCEDURE — 85027 COMPLETE CBC AUTOMATED: CPT

## 2024-03-02 PROCEDURE — A9270 NON-COVERED ITEM OR SERVICE: HCPCS | Performed by: STUDENT IN AN ORGANIZED HEALTH CARE EDUCATION/TRAINING PROGRAM

## 2024-03-02 PROCEDURE — 99232 SBSQ HOSP IP/OBS MODERATE 35: CPT | Performed by: STUDENT IN AN ORGANIZED HEALTH CARE EDUCATION/TRAINING PROGRAM

## 2024-03-02 RX ORDER — HALOPERIDOL 5 MG/ML
2 INJECTION INTRAMUSCULAR ONCE
Status: COMPLETED | OUTPATIENT
Start: 2024-03-02 | End: 2024-03-02

## 2024-03-02 RX ORDER — AMOXICILLIN AND CLAVULANATE POTASSIUM 875; 125 MG/1; MG/1
1 TABLET, FILM COATED ORAL EVERY 12 HOURS
Status: DISCONTINUED | OUTPATIENT
Start: 2024-03-02 | End: 2024-03-06 | Stop reason: HOSPADM

## 2024-03-02 RX ORDER — HALOPERIDOL 5 MG/ML
2 INJECTION INTRAMUSCULAR ONCE
Status: DISCONTINUED | OUTPATIENT
Start: 2024-03-02 | End: 2024-03-02

## 2024-03-02 RX ADMIN — ANTACID TABLETS 500 MG: 500 TABLET, CHEWABLE ORAL at 16:58

## 2024-03-02 RX ADMIN — OXYCODONE 5 MG: 5 TABLET ORAL at 20:13

## 2024-03-02 RX ADMIN — QUETIAPINE FUMARATE 25 MG: 25 TABLET ORAL at 00:11

## 2024-03-02 RX ADMIN — FAMOTIDINE 20 MG: 20 TABLET, FILM COATED ORAL at 04:28

## 2024-03-02 RX ADMIN — FAMOTIDINE 20 MG: 20 TABLET, FILM COATED ORAL at 16:58

## 2024-03-02 RX ADMIN — TAMSULOSIN HYDROCHLORIDE 0.4 MG: 0.4 CAPSULE ORAL at 09:56

## 2024-03-02 RX ADMIN — DOCUSATE SODIUM 50 MG AND SENNOSIDES 8.6 MG 2 TABLET: 8.6; 5 TABLET, FILM COATED ORAL at 16:58

## 2024-03-02 RX ADMIN — LISINOPRIL 40 MG: 20 TABLET ORAL at 04:28

## 2024-03-02 RX ADMIN — ENOXAPARIN SODIUM 40 MG: 100 INJECTION SUBCUTANEOUS at 16:59

## 2024-03-02 RX ADMIN — HALOPERIDOL LACTATE 2 MG: 5 INJECTION, SOLUTION INTRAMUSCULAR at 03:43

## 2024-03-02 RX ADMIN — AMLODIPINE BESYLATE 2.5 MG: 5 TABLET ORAL at 04:27

## 2024-03-02 RX ADMIN — SODIUM CHLORIDE: 9 INJECTION, SOLUTION INTRAVENOUS at 17:04

## 2024-03-02 RX ADMIN — AMOXICILLIN AND CLAVULANATE POTASSIUM 1 TABLET: 875; 125 TABLET, FILM COATED ORAL at 18:06

## 2024-03-02 RX ADMIN — ACETAMINOPHEN 650 MG: 325 TABLET, FILM COATED ORAL at 00:07

## 2024-03-02 RX ADMIN — CEFTRIAXONE SODIUM 1000 MG: 10 INJECTION, POWDER, FOR SOLUTION INTRAVENOUS at 04:28

## 2024-03-02 ASSESSMENT — PAIN DESCRIPTION - PAIN TYPE
TYPE: ACUTE PAIN

## 2024-03-02 ASSESSMENT — PAIN SCALES - WONG BAKER
WONGBAKER_NUMERICALRESPONSE: HURTS EVEN MORE
WONGBAKER_NUMERICALRESPONSE: HURTS A LITTLE MORE

## 2024-03-02 NOTE — PROGRESS NOTES
Patient's care assumed by 1900 hr, patient was laying on the bed after finishing his dinner. He stated no having pain at this time. Patent Mccabe draining clear yellow urine. Bed in lowest and locked position. Hourly rounding in place. Call bell within reach.

## 2024-03-02 NOTE — THERAPY
"Physical Therapy   Initial Evaluation     Patient Name: Fausto Martin  Age:  74 y.o., Sex:  male  Medical Record #: [id number]  Today's Date: 3/1/2024     Precautions  Precautions: Fall Risk  Comments: Leg bag at baseline    Assessment  73 y.o. male with past medical history of chronic lower back pain, urinary retention, indwelling Mccabe catheter, intermittent constipation, recurrent UTIs, peripheral neuropathy, hypertension, dyslipidemia who presented 2/29/2024 with complaints of generalized weakness worsening today.  He states that he has bladder discomforts and spasms on and off as well.  Reported difficulties with standing up and ambulating.    Patient presents for PT eval with impaired strength, balance, gait and activity tolerance. Patient able to demo bed mobility and transfers with [facility] to [place] with increased time a difficulty.  He does have services at home but at this time is not at his baseline. Recommend placement for further therapy.     Plan    Physical Therapy Initial Treatment Plan   Treatment Plan : Bed Mobility, Equipment, Gait Training, Neuro Re-Education / Balance, Self Care / Home Evaluation, Stair Training, Therapeutic Activities, Therapeutic Exercise  Treatment Frequency: 4 Times per Week  Duration: Until Therapy Goals Met    DC Equipment Recommendations: Unable to determine at this time  Discharge Recommendations: Recommend post-acute placement for additional physical therapy services prior to discharge home       Subjective    \"I've just been having a lot of trouble with standing\"     Objective       03/01/24 1115   Precautions   Precautions Fall Risk   Comments Leg bag at baseline   Vitals   Patient BP Position Sitting;Orthostatic:   Blood Pressure  (!) 151/70   Pain 0 - 10 Group   Location Back;Abdomen   Therapist Pain Assessment During Activity;8  (chronic back pain 2/2 scoliosis)   Prior Living Situation   Prior Services Skilled Home Health Services;Meals on Wheels   Housing / " Facility 1 [facility]\""   Steps Into Home 1   Steps In Home 0   Equipment Owned 4-Wheel Walker;Front-Wheel Walker;Quad Cane   Lives with - Patient's Self Care Capacity Alone and Unable to Care For Self   Comments Patient has had HH PT/OT/RN since last admission. He received MOW daily and his frined drives him to the [facility] and MD appts   Prior Level of Functional Mobility   Bed Mobility Independent   Transfer Status Independent   Ambulation Independent   Ambulation Distance short community distances   Assistive Devices Used 4-Wheel Walker   Comments patient reports he has a small apartment and has difficulty with bed mobility and meal prep   History of Falls   History of Falls No   Cognition    Cognition / Consciousness WDL   Comments pleasant nad cooperative   Active ROM Upper Body   Active ROM Upper Body  WDL   Strength Upper Body   Upper Body Strength  X   Gross Strength Generalized Weakness, Equal Bilaterally.    Sensation Upper Body   Upper Extremity Sensation  WDL   Upper Body Muscle Tone   Upper Body Muscle Tone  X   Rt Upper Extremity Muscle Tone Hypertonic;Functional   Lt Upper Extremity Muscle Tone Hypertonic;Functional   Active ROM Lower Body    Active ROM Lower Body  WDL   Strength Lower Body   Lower Body Strength  X   Gross Strength Generalized Weakness, Equal Bilaterally   Sensation Lower Body   Lower Extremity Sensation   WDL   Lower Body Muscle Tone   Lower Body Muscle Tone  X   Rt Lower Extremity Muscle Tone Hypertonic;Functional   Lt Lower Extremity Muscle Tone Hypertonic;Functional   Other Treatments   Other Treatments Provided Discussed DC recs   Balance Assessment   Sitting Balance (Static) Fair   Sitting Balance (Dynamic) Fair   Standing Balance (Static) Fair   Standing Balance (Dynamic) Fair   Weight Shift Sitting Fair   Weight Shift Standing Fair   Bed Mobility    Supine to Sit Supervised   Scooting Supervised   Comments increased time and effort with use of bed rail   Gait Analysis    Gait Level Of Assist Contact Guard Assist   Assistive Device 4 Wheel Walker   Distance (Feet) 75   # of Times Distance was Traveled 2   Deviation Bradykinetic   Comments FF trunk with one standing rest break   Functional Mobility   Sit to Stand Minimal Assist   Toilet Transfers Minimal Assist   Mobility cueing for hand placement with 4WW   6 Clicks Assessment - How much HELP from from another person do you currently need... (If the patient hasn't done an activity recently, how much help from another person do you think he/she would need if he/she tried?)   Turning from your back to your side while in a flat bed without using bedrails? 3   Moving from lying on your back to sitting on the side of a flat bed without using bedrails? 3   Moving to and from a bed to a chair (including a wheelchair)? 3   Standing up from a chair using your arms (e.g., wheelchair, or bedside chair)? 3   Walking in hospital room? 3   Climbing 3-5 steps with a railing? 2   6 clicks Mobility Score 17   Activity Tolerance   Sitting in Chair post session   Sitting Edge of Bed 10 min   Standing 5 min   Patient / Family Goals    Patient / Family Goal #1 to get stronger   Short Term Goals    Short Term Goal # 1 in 6 visits patient will demo all bed mobility from flat surface with sup for improved independence   Short Term Goal # 2 in 6 visits patient will demo all transfers with sup and LRAD for safe DC home   Short Term Goal # 3 in 6 visits patient will ambulate 200' with sup and LRAD for improved independence   Short Term Goal # 4 in 6 visits patient will navigate 1 stairs w/LRAD sup for safe DC   Education Group   Education Provided Role of Physical Therapist;Gait Training;Use of Assistive Device   Role of Physical Therapist Patient Response Patient;Acceptance;Explanation;Demonstration;Verbal Demonstration;Action Demonstration   Gait Training Patient Response Patient;Acceptance;Explanation;Demonstration;Verbal Demonstration;Action  Demonstration   Use of Assistive Device Patient Response Patient;Acceptance;Explanation;Demonstration;Action Demonstration;Verbal Demonstration   Physical Therapy Initial Treatment Plan    Treatment Plan  Bed Mobility;Equipment;Gait Training;Neuro Re-Education / Balance;Self Care / Home Evaluation;Stair Training;Therapeutic Activities;Therapeutic Exercise   Treatment Frequency 4 Times per Week   Duration Until Therapy Goals Met   Problem List    Problems Impaired Ambulation;Decreased Activity Tolerance;Impaired Balance;Functional Strength Deficit;Impaired Transfers;Pain;Impaired Bed Mobility;Motor Planning / Sequencing   Anticipated Discharge Equipment and Recommendations   DC Equipment Recommendations Unable to determine at this time   Discharge Recommendations Recommend post-acute placement for additional physical therapy services prior to discharge home     Paulette Madrid, PT, DPT, GCS

## 2024-03-02 NOTE — CARE PLAN
The patient is Stable - Low risk of patient condition declining or worsening    Shift Goals  Clinical Goals: iv  fluids, pain management  Patient Goals: monitor urinary output, safety  Family Goals: NA    Progress made toward(s) clinical / shift goals:  Patient was restless and confused since about 2000 hr. He was continuously drinking water for an urinary output of about 1200. He pulled his iv; he was seen pulling on his urethral Mccabe. He got agitated at 0330 hr saying that he got go to the [facility]. He was reoriented and he left us to place another iv and give a dose of Haldol, to resume with his iv fluids replacement.

## 2024-03-02 NOTE — PROGRESS NOTES
"Patient woke up by 2345 hr asking to get up. Reoriented that it is only midnight. \"I got to pee\" I said that he does not need that since he got a Mccabe catheter. Then he touch it, he said: \"I though I pulled it out\".  Provider notified and ordered Seroquel 25 mg for his restlessness.   "

## 2024-03-02 NOTE — PROGRESS NOTES
Patient got off the bed, sat on the edge of the bed and he flip over, he said that he is going to put his head where his feet are and viceversa. He still oriented  x 2, not to situation and time. Stated he is having right hip pain. Oxy 5 mg PO given. Bed frame alarm set up.

## 2024-03-02 NOTE — PROGRESS NOTES
Once the pain medication start to be effective, patient fell asleep in his chosen position. Heads at the bottom and feet at the HOB. Hourly rounding in place.

## 2024-03-02 NOTE — CARE PLAN
The patient is Stable - Low risk of patient condition declining or worsening    Shift Goals  Clinical Goals: IV fluids,orientation  Patient Goals: monitorurinary output  Family Goals: NA    Progress made toward(s) clinical / shift goals:    Problem: Knowledge Deficit - Standard  Goal: Patient and family/care givers will demonstrate understanding of plan of care, disease process/condition, diagnostic tests and medications  Outcome: Progressing     Problem: Fall Risk  Goal: Patient will remain free from falls  Outcome: Progressing     Problem: Pain - Standard  Goal: Alleviation of pain or a reduction in pain to the patient’s comfort goal  Outcome: Progressing     Problem: Infection - Standard  Goal: Patient will remain free from infection  Outcome: Progressing       Patient is not progressing towards the following goals:

## 2024-03-03 LAB — VIT B12 SERPL-MCNC: 360 PG/ML (ref 211–911)

## 2024-03-03 PROCEDURE — 700102 HCHG RX REV CODE 250 W/ 637 OVERRIDE(OP): Performed by: STUDENT IN AN ORGANIZED HEALTH CARE EDUCATION/TRAINING PROGRAM

## 2024-03-03 PROCEDURE — 700102 HCHG RX REV CODE 250 W/ 637 OVERRIDE(OP): Performed by: INTERNAL MEDICINE

## 2024-03-03 PROCEDURE — 82607 VITAMIN B-12: CPT

## 2024-03-03 PROCEDURE — A9270 NON-COVERED ITEM OR SERVICE: HCPCS | Performed by: STUDENT IN AN ORGANIZED HEALTH CARE EDUCATION/TRAINING PROGRAM

## 2024-03-03 PROCEDURE — A9270 NON-COVERED ITEM OR SERVICE: HCPCS | Performed by: INTERNAL MEDICINE

## 2024-03-03 PROCEDURE — 99233 SBSQ HOSP IP/OBS HIGH 50: CPT | Performed by: STUDENT IN AN ORGANIZED HEALTH CARE EDUCATION/TRAINING PROGRAM

## 2024-03-03 PROCEDURE — 36415 COLL VENOUS BLD VENIPUNCTURE: CPT

## 2024-03-03 PROCEDURE — 700105 HCHG RX REV CODE 258: Performed by: INTERNAL MEDICINE

## 2024-03-03 PROCEDURE — 700111 HCHG RX REV CODE 636 W/ 250 OVERRIDE (IP): Mod: JZ | Performed by: INTERNAL MEDICINE

## 2024-03-03 PROCEDURE — 770006 HCHG ROOM/CARE - MED/SURG/GYN SEMI*

## 2024-03-03 PROCEDURE — 82652 VIT D 1 25-DIHYDROXY: CPT

## 2024-03-03 PROCEDURE — 305520 CATH IV 20GA X 1.25": Performed by: STUDENT IN AN ORGANIZED HEALTH CARE EDUCATION/TRAINING PROGRAM

## 2024-03-03 RX ADMIN — OXYCODONE 2.5 MG: 5 TABLET ORAL at 15:33

## 2024-03-03 RX ADMIN — LISINOPRIL 40 MG: 20 TABLET ORAL at 04:45

## 2024-03-03 RX ADMIN — POLYETHYLENE GLYCOL 3350 1 PACKET: 17 POWDER, FOR SOLUTION ORAL at 03:51

## 2024-03-03 RX ADMIN — OXYCODONE 5 MG: 5 TABLET ORAL at 22:41

## 2024-03-03 RX ADMIN — FAMOTIDINE 20 MG: 20 TABLET, FILM COATED ORAL at 04:45

## 2024-03-03 RX ADMIN — ANTACID TABLETS 500 MG: 500 TABLET, CHEWABLE ORAL at 17:47

## 2024-03-03 RX ADMIN — AMOXICILLIN AND CLAVULANATE POTASSIUM 1 TABLET: 875; 125 TABLET, FILM COATED ORAL at 17:47

## 2024-03-03 RX ADMIN — TAMSULOSIN HYDROCHLORIDE 0.4 MG: 0.4 CAPSULE ORAL at 08:05

## 2024-03-03 RX ADMIN — ENOXAPARIN SODIUM 40 MG: 100 INJECTION SUBCUTANEOUS at 17:48

## 2024-03-03 RX ADMIN — FAMOTIDINE 20 MG: 20 TABLET, FILM COATED ORAL at 17:47

## 2024-03-03 RX ADMIN — DOCUSATE SODIUM 50 MG AND SENNOSIDES 8.6 MG 2 TABLET: 8.6; 5 TABLET, FILM COATED ORAL at 17:47

## 2024-03-03 RX ADMIN — OXYCODONE 5 MG: 5 TABLET ORAL at 19:42

## 2024-03-03 RX ADMIN — AMOXICILLIN AND CLAVULANATE POTASSIUM 1 TABLET: 875; 125 TABLET, FILM COATED ORAL at 04:44

## 2024-03-03 RX ADMIN — AMLODIPINE BESYLATE 2.5 MG: 5 TABLET ORAL at 04:44

## 2024-03-03 RX ADMIN — SODIUM CHLORIDE: 9 INJECTION, SOLUTION INTRAVENOUS at 04:44

## 2024-03-03 ASSESSMENT — PAIN DESCRIPTION - PAIN TYPE
TYPE: ACUTE PAIN

## 2024-03-03 ASSESSMENT — ENCOUNTER SYMPTOMS
NAUSEA: 1
WEAKNESS: 1

## 2024-03-03 NOTE — PROGRESS NOTES
"Patient was eating his dinner. \"I just want to apologize to you for my behavior last night\". Apologies accepted, do not worry. I knew it was your bladder infection making you act like that. Bed in lowest and locked position. Call bell within reach.  "

## 2024-03-03 NOTE — PROGRESS NOTES
Hospital Medicine Daily Progress Note    Date of Service  3/2/2024    Chief Complaint  Fausto Martin is a 74 y.o. male admitted 2/29/2024 with weakness and bladder pain    Hospital Course  73 y.o. male with past medical history of chronic lower back pain, urinary retention, indwelling Mccabe catheter, intermittent constipation, recurrent UTIs, peripheral neuropathy, hypertension, dyslipidemia who presented 2/29/2024 with complaints of generalized weakness worsening today.  He states that he has bladder discomforts and spasms on and off as well.  Reported difficulties with standing up and ambulating  He was treated with Keflex prescribed on 2/5, and second time prescribed 2/21 through 2/26.  Yesterday he was seen at urology clinic when he was prescribed Bactrim for apparently persistent UTI and Mccabe catheter was exchanged.  He denies nausea vomiting or diarrhea, fever, chills, cough.  Reported that he has difficulty standing up and ambulate.  He also reports significant heartburn and acid reflux in the last 2 days  UA again is significant for moderate leukocyte esterase, WBC 10-20, RBC greater than 150, few bacteria, large occult blood.  CT of the abdomen and pelvis without contrast showed no acute abnormalities.  He was given 1 L of saline for presumed intravascular depletion and IV Ancef 2 g.    Interval Problem Update  Patient continues to feel poorly and has generalized weakness.  He also reports nausea.  His blood pressure has been uncontrolled at 156/85.  Urine culture is pending.  Given the patient has had recurrent UTIs in the past month and treated with Keflex and Bactrim I would like to continue IV ceftriaxone and wait for urine culture and sensitivity results.  I have ordered PT OT.  I will restart amlodipine and lisinopril.  IV labetalol as needed    3/2 Afebrile, vitals wnl on room air. Cultures reviewed, Enterococcus Group D, discussed with pharmacy and antibiotics broadened to Augmentin.  Tolerating PO intake, still feeling malaise, hopefully with abx change will make some ground overnight.    I have discussed this patient's plan of care and discharge plan at IDT rounds today with Case Management, Nursing, Nursing leadership, and other members of the IDT team.    Consultants/Specialty  none    Code Status  Full Code    Disposition  Medically Cleared  I have placed the appropriate orders for post-discharge needs.    Review of Systems  Review of Systems   Gastrointestinal:  Positive for nausea.   Genitourinary:  Positive for dysuria.   Neurological:  Positive for weakness.        Physical Exam  Temp:  [36.3 °C (97.3 °F)-37.2 °C (98.9 °F)] 37.2 °C (98.9 °F)  Pulse:  [72-90] 90  Resp:  [16-18] 16  BP: (121-133)/(73-87) 124/78  SpO2:  [92 %-94 %] 92 %    Physical Exam  Vitals and nursing note reviewed.   Constitutional:       General: He is not in acute distress.     Appearance: He is not toxic-appearing.   HENT:      Head: Normocephalic.      Mouth/Throat:      Mouth: Mucous membranes are moist.   Eyes:      Pupils: Pupils are equal, round, and reactive to light.   Cardiovascular:      Rate and Rhythm: Normal rate and regular rhythm.      Pulses: Normal pulses.      Heart sounds: Normal heart sounds.   Pulmonary:      Effort: Pulmonary effort is normal.      Breath sounds: Normal breath sounds.   Abdominal:      Palpations: Abdomen is soft.      Tenderness: There is no abdominal tenderness.   Musculoskeletal:         General: No swelling.      Cervical back: Neck supple.   Skin:     General: Skin is warm.      Coloration: Skin is not jaundiced.   Neurological:      General: No focal deficit present.      Mental Status: He is alert and oriented to person, place, and time.   Psychiatric:         Mood and Affect: Mood normal.         Behavior: Behavior normal.         Fluids    Intake/Output Summary (Last 24 hours) at 3/3/2024 0402  Last data filed at 3/2/2024 2040  Gross per 24 hour   Intake 360 ml    Output 1900 ml   Net -1540 ml       Laboratory  Recent Labs     02/29/24  1147 03/01/24  0151 03/02/24  0052   WBC 12.0* 7.8 10.0   RBC 4.66* 3.91* 3.83*   HEMOGLOBIN 15.4 12.7* 12.5*   HEMATOCRIT 43.9 37.1* 35.1*   MCV 94.2 94.9 91.6   MCH 33.0 32.5 32.6   MCHC 35.1 34.2 35.6   RDW 43.5 45.1 42.0   PLATELETCT 190 142* 136*   MPV 10.6 10.8 10.6     Recent Labs     02/29/24  1147 03/01/24  0151 03/02/24  0052   SODIUM 139 137 136   POTASSIUM 4.3 3.9 4.0   CHLORIDE 106 106 105   CO2 20 22 19*   GLUCOSE 109* 101* 133*   BUN 20 21 14   CREATININE 1.00 1.11 0.90   CALCIUM 9.0 8.1* 8.2*                   Imaging  CT-RENAL COLIC EVALUATION(A/P W/O)   Final Result      1. No urinary tract calculus identified. No renal collecting system dilatation.      2. No evidence of inflammatory change in the abdomen or pelvis. The study is limited due to nonuse of intravenous contrast.      3. Stable simple appearing right renal cortical cysts. No further imaging follow-up is warranted as per consensus guidelines based on imaging criteria.      4. Bladder decompressed by an indwelling Mccabe catheter.      DX-CHEST-PORTABLE (1 VIEW)   Final Result      No acute cardiac or pulmonary abnormalities are identified.           Assessment/Plan  * UTI (urinary tract infection) catheter associated- (present on admission)  Assessment & Plan  73-year-old male with urine retention, indwelling Mccabe catheter, recurrent UTIs, presented with generalized weakness and intermittent bladder spasms,   He was treated with Keflex since the beginning of the month and started on Bactrim yesterday for persistent symptoms.  UA showed leukocyte esterase, pyuria, few bacteria, mild leukocytosis WBC 12.0.  CT of the abdomen pelvis without contrast showed no obstruction.  Plan to treat with IV Ceftriaxone  Follow Urine culture  Follow-up with urology as outpatient  Mccabe was recently exchanged     GERD (gastroesophageal reflux disease)- (present on  admission)  Assessment & Plan  In exacerbation  Ordered EKG, Pepcid, sucralfate and Tums    Peripheral neuropathy- (present on admission)  Assessment & Plan  Continue gabapentin    Generalized weakness- (present on admission)  Assessment & Plan  TSH, CPK, phosphorus WNL  Check Vit D and B12  PT OT evaluation, fall precautions    Hypertension- (present on admission)  Assessment & Plan  Uncontrolled  Resume amlodipine and lisinopril   IV labetalol as needed    Bilirubinemia- (present on admission)  Assessment & Plan  Total bilirubin 1.6.  Appearance mild chronic  Follow-up with PCP    Leukocytosis- (present on admission)  Assessment & Plan  WBC 12.0  Follow-up with repeat CBC         VTE prophylaxis: lovenox    I have performed a physical exam and reviewed and updated ROS and Plan today (3/2/2024). In review of yesterday's note (3/1/2024), there are no changes except as documented above.    I spent 53 minutes, reviewing the chart, obtaining and/or reviewing separately obtained history. Performing a medically appropriate examination and evaluation.  Counseling and educating the patient. Ordering and reviewing medications, tests, or procedures. Documenting clinical information in EPIC. Independently interpreting results and communicating results to patient. Discussing future disposition of care with patient, RN and case management.

## 2024-03-03 NOTE — DISCHARGE PLANNING
Case Management Discharge Planning    Admission Date: 2/29/2024  GMLOS: 3.3  ALOS: 2    6-Clicks ADL Score: 14  6-Clicks Mobility Score: 11  PT and/or OT Eval ordered: Yes  Post-acute Referrals Ordered: No  Post-acute Choice Obtained: No  Has referral(s) been sent to post-acute provider:  No    Anticipated Discharge Dispo: Discharge Disposition: Discharged to home/self care (01)    DME Needed: No    Action(s) Taken:   PT recommending post-acute placement for continued physical therapy prior to discharge home.     CM RN requested DPA send referrals to local SNFs per protocol.     Escalations Completed: None    Medically Clear: No    Next Steps:  CM RN to follow up with medical team to discuss discharge barriers or needs.     Barriers to Discharge: Medical clearance and Pending Placement    Is the patient up for discharge tomorrow: No

## 2024-03-03 NOTE — CARE PLAN
The patient is Stable - Low risk of patient condition declining or worsening    Shift Goals  Clinical Goals: Mobility, OT eval  Patient Goals: Comfort  Family Goals: RADHA    Progress made toward(s) clinical / shift goals:  Patient and family educated on plan of care and verbalized understanding. Patient has remained free of falls this shift with appropriate fall precautions in place thorughout shift. Patient skin integrity maintained throughout shift.     Problem: Knowledge Deficit - Standard  Goal: Patient and family/care givers will demonstrate understanding of plan of care, disease process/condition, diagnostic tests and medications  Outcome: Progressing     Problem: Fall Risk  Goal: Patient will remain free from falls  Outcome: Progressing     Patient is not progressing towards the following goals:

## 2024-03-03 NOTE — CARE PLAN
The patient is Stable - Low risk of patient condition declining or worsening    Shift Goals  Clinical Goals: iv fluids replacement, monitor urine output  Patient Goals: urine output, rest  Family Goals: NA    Progress made toward(s) clinical / shift goals:  Patient has continuous fluids replacement. He voided about 600 ml of urine. He was concerned about having a BM. Miralax PRN given.

## 2024-03-03 NOTE — PROGRESS NOTES
Patient called on for assistance. He was found playing and pulling on his statlock for the urinary tubing. Encouraged not to peel it off. Request to sit down to have #2 Commode chair found for him  Unsuccessful in having a BM.

## 2024-03-03 NOTE — PROGRESS NOTES
Pt called with concerns regarding prater catheter dysfunction. On assessment, ample clear urine draining into drainage bag. Prater care provided. Prater tubing repositioned for comfort. Prater irrigated with no signs of dysfunction. Educated pt to call should the pt notice any further problems.

## 2024-03-03 NOTE — PROGRESS NOTES
Hospital Medicine Daily Progress Note    Date of Service  3/3/2024    Chief Complaint  Fausto Martin is a 74 y.o. male admitted 2/29/2024 with weakness and bladder pain    Hospital Course  73 y.o. male with past medical history of chronic lower back pain, urinary retention, indwelling Mccabe catheter, intermittent constipation, recurrent UTIs, peripheral neuropathy, hypertension, dyslipidemia who presented 2/29/2024 with complaints of generalized weakness worsening today.  He states that he has bladder discomforts and spasms on and off as well.  Reported difficulties with standing up and ambulating  He was treated with Keflex prescribed on 2/5, and second time prescribed 2/21 through 2/26.  Yesterday he was seen at urology clinic when he was prescribed Bactrim for apparently persistent UTI and Mccabe catheter was exchanged.  He denies nausea vomiting or diarrhea, fever, chills, cough.  Reported that he has difficulty standing up and ambulate.  He also reports significant heartburn and acid reflux in the last 2 days  UA again is significant for moderate leukocyte esterase, WBC 10-20, RBC greater than 150, few bacteria, large occult blood.  CT of the abdomen and pelvis without contrast showed no acute abnormalities.  He was given 1 L of saline for presumed intravascular depletion and IV Ancef 2 g.    Interval Problem Update  Patient continues to feel poorly and has generalized weakness.  He also reports nausea.  His blood pressure has been uncontrolled at 156/85.  Urine culture is pending.  Given the patient has had recurrent UTIs in the past month and treated with Keflex and Bactrim I would like to continue IV ceftriaxone and wait for urine culture and sensitivity results.  I have ordered PT OT.  I will restart amlodipine and lisinopril.  IV labetalol as needed    3/2 Afebrile, vitals wnl on room air. Cultures reviewed, Enterococcus Group D, discussed with pharmacy and antibiotics broadened to Augmentin.  Tolerating PO intake, still feeling malaise, hopefully with abx change will make some ground overnight.    3/3  Examined in his inpatient room, afebrile pulse 70 respiratory rate 17 blood pressure 127/79 pulse ox 94% on room air.  Check labs in the morning.  Blood cultures no growth to date.  Urine culture growing Enterococcus faecalis which is pansensitive.  Continue Augmentin.  He had his IV blow during infusion of his antibiotic.  Still feeling weak, feels only slightly better, explained to him that he has received less than 24 hours of appropriate antibiotic coverage.  Continue with antibiotics, continue supportive care.  Watch renal function and urine output blood pressure and respiratory status.  Pleasant mood.    I have discussed this patient's plan of care and discharge plan at IDT rounds today with Case Management, Nursing, Nursing leadership, and other members of the IDT team.    Consultants/Specialty  none    Code Status  Full Code    Disposition  Medically Cleared  I have placed the appropriate orders for post-discharge needs.    Review of Systems  Review of Systems   Gastrointestinal:  Positive for nausea.   Genitourinary:  Positive for dysuria.   Neurological:  Positive for weakness.        Physical Exam  Temp:  [36.3 °C (97.3 °F)-37.2 °C (98.9 °F)] 36.3 °C (97.3 °F)  Pulse:  [70-90] 70  Resp:  [16-18] 17  BP: (123-133)/(73-87) 127/79  SpO2:  [92 %-94 %] 94 %    Physical Exam  Vitals and nursing note reviewed.   Constitutional:       General: He is not in acute distress.     Appearance: He is not toxic-appearing.   HENT:      Head: Normocephalic.      Nose: Nose normal. No rhinorrhea.      Mouth/Throat:      Mouth: Mucous membranes are moist.   Eyes:      General: No scleral icterus.     Extraocular Movements: Extraocular movements intact.      Conjunctiva/sclera: Conjunctivae normal.   Cardiovascular:      Rate and Rhythm: Normal rate and regular rhythm.      Pulses: Normal pulses.      Heart sounds:  Normal heart sounds.   Pulmonary:      Effort: Pulmonary effort is normal.      Breath sounds: Normal breath sounds.   Abdominal:      Palpations: Abdomen is soft.      Tenderness: There is no abdominal tenderness.   Musculoskeletal:         General: No swelling.      Cervical back: Normal range of motion and neck supple.      Right lower leg: No edema.      Left lower leg: No edema.   Skin:     General: Skin is warm.      Coloration: Skin is not jaundiced.   Neurological:      General: No focal deficit present.      Mental Status: He is alert and oriented to person, place, and time.   Psychiatric:         Mood and Affect: Mood normal.         Behavior: Behavior normal.         Fluids    Intake/Output Summary (Last 24 hours) at 3/3/2024 1440  Last data filed at 3/3/2024 0920  Gross per 24 hour   Intake 300 ml   Output 1750 ml   Net -1450 ml       Laboratory  Recent Labs     03/01/24  0151 03/02/24  0052   WBC 7.8 10.0   RBC 3.91* 3.83*   HEMOGLOBIN 12.7* 12.5*   HEMATOCRIT 37.1* 35.1*   MCV 94.9 91.6   MCH 32.5 32.6   MCHC 34.2 35.6   RDW 45.1 42.0   PLATELETCT 142* 136*   MPV 10.8 10.6     Recent Labs     03/01/24  0151 03/02/24  0052   SODIUM 137 136   POTASSIUM 3.9 4.0   CHLORIDE 106 105   CO2 22 19*   GLUCOSE 101* 133*   BUN 21 14   CREATININE 1.11 0.90   CALCIUM 8.1* 8.2*                   Imaging  CT-RENAL COLIC EVALUATION(A/P W/O)   Final Result      1. No urinary tract calculus identified. No renal collecting system dilatation.      2. No evidence of inflammatory change in the abdomen or pelvis. The study is limited due to nonuse of intravenous contrast.      3. Stable simple appearing right renal cortical cysts. No further imaging follow-up is warranted as per consensus guidelines based on imaging criteria.      4. Bladder decompressed by an indwelling Mccabe catheter.      DX-CHEST-PORTABLE (1 VIEW)   Final Result      No acute cardiac or pulmonary abnormalities are identified.           Assessment/Plan  *  UTI (urinary tract infection) catheter associated- (present on admission)  Assessment & Plan  73-year-old male with urine retention, indwelling Mccabe catheter, recurrent UTIs, presented with generalized weakness and intermittent bladder spasms,   He was treated with Keflex since the beginning of the month and started on Bactrim yesterday for persistent symptoms.  UA showed leukocyte esterase, pyuria, few bacteria, mild leukocytosis WBC 12.0.  CT of the abdomen pelvis without contrast showed no obstruction.  Plan to treat with IV Ceftriaxone  Follow Urine culture  Follow-up with urology as outpatient  Mccabe was recently exchanged     GERD (gastroesophageal reflux disease)- (present on admission)  Assessment & Plan  In exacerbation  Ordered EKG, Pepcid, sucralfate and Tums    Peripheral neuropathy- (present on admission)  Assessment & Plan  Continue gabapentin    Generalized weakness- (present on admission)  Assessment & Plan  TSH, CPK, phosphorus WNL  Check Vit D and B12  PT OT evaluation, fall precautions    Hypertension- (present on admission)  Assessment & Plan  Uncontrolled  Resume amlodipine and lisinopril   IV labetalol as needed    Bilirubinemia- (present on admission)  Assessment & Plan  Total bilirubin 1.6.  Appearance mild chronic  Follow-up with PCP    Leukocytosis- (present on admission)  Assessment & Plan  WBC 12.0  Follow-up with repeat CBC         VTE prophylaxis: lovenox    I have performed a physical exam and reviewed and updated ROS and Plan today (3/3/2024). In review of yesterday's note (3/2/2024), there are no changes except as documented above.    I spent 52 minutes, reviewing the chart, obtaining and/or reviewing separately obtained history. Performing a medically appropriate examination and evaluation.  Counseling and educating the patient. Ordering and reviewing medications, tests, or procedures. Documenting clinical information in EPIC. Independently interpreting results and communicating  results to patient. Discussing future disposition of care with patient, RN and case management.

## 2024-03-03 NOTE — PROGRESS NOTES
Assumed care of pt  at 0700. Report received from NOC RN. Pt is A&O x 4. Patient stated that their pain is 0/10. Pt's pain comfort goal is 0/10.   Pt educated on how to use the call light, pt verbalized understanding. Bed in lowest locked position, call light within reach, hourly rounding in place. Labs reviewed, orders reviewed, communication board updated. Pt declines any further needs at this time

## 2024-03-04 LAB
ANION GAP SERPL CALC-SCNC: 11 MMOL/L (ref 7–16)
BASOPHILS # BLD AUTO: 0.2 % (ref 0–1.8)
BASOPHILS # BLD: 0.02 K/UL (ref 0–0.12)
BUN SERPL-MCNC: 15 MG/DL (ref 8–22)
CALCIUM SERPL-MCNC: 8.2 MG/DL (ref 8.5–10.5)
CHLORIDE SERPL-SCNC: 102 MMOL/L (ref 96–112)
CO2 SERPL-SCNC: 19 MMOL/L (ref 20–33)
CREAT SERPL-MCNC: 0.95 MG/DL (ref 0.5–1.4)
EOSINOPHIL # BLD AUTO: 0.05 K/UL (ref 0–0.51)
EOSINOPHIL NFR BLD: 0.5 % (ref 0–6.9)
ERYTHROCYTE [DISTWIDTH] IN BLOOD BY AUTOMATED COUNT: 42.9 FL (ref 35.9–50)
GFR SERPLBLD CREATININE-BSD FMLA CKD-EPI: 84 ML/MIN/1.73 M 2
GLUCOSE SERPL-MCNC: 142 MG/DL (ref 65–99)
HCT VFR BLD AUTO: 35.7 % (ref 42–52)
HGB BLD-MCNC: 12.4 G/DL (ref 14–18)
IMM GRANULOCYTES # BLD AUTO: 0.04 K/UL (ref 0–0.11)
IMM GRANULOCYTES NFR BLD AUTO: 0.4 % (ref 0–0.9)
LYMPHOCYTES # BLD AUTO: 0.69 K/UL (ref 1–4.8)
LYMPHOCYTES NFR BLD: 6.8 % (ref 22–41)
MAGNESIUM SERPL-MCNC: 1.7 MG/DL (ref 1.5–2.5)
MCH RBC QN AUTO: 32.3 PG (ref 27–33)
MCHC RBC AUTO-ENTMCNC: 34.7 G/DL (ref 32.3–36.5)
MCV RBC AUTO: 93 FL (ref 81.4–97.8)
MONOCYTES # BLD AUTO: 1.09 K/UL (ref 0–0.85)
MONOCYTES NFR BLD AUTO: 10.7 % (ref 0–13.4)
NEUTROPHILS # BLD AUTO: 8.3 K/UL (ref 1.82–7.42)
NEUTROPHILS NFR BLD: 81.4 % (ref 44–72)
NRBC # BLD AUTO: 0 K/UL
NRBC BLD-RTO: 0 /100 WBC (ref 0–0.2)
PHOSPHATE SERPL-MCNC: 2.3 MG/DL (ref 2.5–4.5)
PLATELET # BLD AUTO: 148 K/UL (ref 164–446)
PMV BLD AUTO: 10.8 FL (ref 9–12.9)
POTASSIUM SERPL-SCNC: 4.3 MMOL/L (ref 3.6–5.5)
RBC # BLD AUTO: 3.84 M/UL (ref 4.7–6.1)
SODIUM SERPL-SCNC: 132 MMOL/L (ref 135–145)
WBC # BLD AUTO: 10.2 K/UL (ref 4.8–10.8)

## 2024-03-04 PROCEDURE — 97166 OT EVAL MOD COMPLEX 45 MIN: CPT

## 2024-03-04 PROCEDURE — 84100 ASSAY OF PHOSPHORUS: CPT

## 2024-03-04 PROCEDURE — 36415 COLL VENOUS BLD VENIPUNCTURE: CPT

## 2024-03-04 PROCEDURE — 700102 HCHG RX REV CODE 250 W/ 637 OVERRIDE(OP): Performed by: INTERNAL MEDICINE

## 2024-03-04 PROCEDURE — 80048 BASIC METABOLIC PNL TOTAL CA: CPT

## 2024-03-04 PROCEDURE — 83735 ASSAY OF MAGNESIUM: CPT

## 2024-03-04 PROCEDURE — 770006 HCHG ROOM/CARE - MED/SURG/GYN SEMI*

## 2024-03-04 PROCEDURE — 700111 HCHG RX REV CODE 636 W/ 250 OVERRIDE (IP): Mod: JZ | Performed by: INTERNAL MEDICINE

## 2024-03-04 PROCEDURE — A9270 NON-COVERED ITEM OR SERVICE: HCPCS | Performed by: INTERNAL MEDICINE

## 2024-03-04 PROCEDURE — A9270 NON-COVERED ITEM OR SERVICE: HCPCS | Performed by: STUDENT IN AN ORGANIZED HEALTH CARE EDUCATION/TRAINING PROGRAM

## 2024-03-04 PROCEDURE — 700102 HCHG RX REV CODE 250 W/ 637 OVERRIDE(OP): Performed by: STUDENT IN AN ORGANIZED HEALTH CARE EDUCATION/TRAINING PROGRAM

## 2024-03-04 PROCEDURE — 99232 SBSQ HOSP IP/OBS MODERATE 35: CPT | Performed by: INTERNAL MEDICINE

## 2024-03-04 PROCEDURE — 85025 COMPLETE CBC W/AUTO DIFF WBC: CPT

## 2024-03-04 RX ORDER — COLCHICINE 0.6 MG/1
0.6 TABLET ORAL DAILY
Status: DISCONTINUED | OUTPATIENT
Start: 2024-03-05 | End: 2024-03-06 | Stop reason: HOSPADM

## 2024-03-04 RX ORDER — COLCHICINE 0.6 MG/1
1.2 TABLET ORAL ONCE
Status: COMPLETED | OUTPATIENT
Start: 2024-03-04 | End: 2024-03-04

## 2024-03-04 RX ADMIN — AMOXICILLIN AND CLAVULANATE POTASSIUM 1 TABLET: 875; 125 TABLET, FILM COATED ORAL at 05:20

## 2024-03-04 RX ADMIN — OXYCODONE 2.5 MG: 5 TABLET ORAL at 23:58

## 2024-03-04 RX ADMIN — OXYCODONE 5 MG: 5 TABLET ORAL at 14:34

## 2024-03-04 RX ADMIN — ENOXAPARIN SODIUM 40 MG: 100 INJECTION SUBCUTANEOUS at 17:45

## 2024-03-04 RX ADMIN — AMOXICILLIN AND CLAVULANATE POTASSIUM 1 TABLET: 875; 125 TABLET, FILM COATED ORAL at 17:46

## 2024-03-04 RX ADMIN — DOCUSATE SODIUM 50 MG AND SENNOSIDES 8.6 MG 2 TABLET: 8.6; 5 TABLET, FILM COATED ORAL at 17:45

## 2024-03-04 RX ADMIN — FAMOTIDINE 20 MG: 20 TABLET, FILM COATED ORAL at 17:46

## 2024-03-04 RX ADMIN — TAMSULOSIN HYDROCHLORIDE 0.4 MG: 0.4 CAPSULE ORAL at 08:37

## 2024-03-04 RX ADMIN — OXYCODONE 5 MG: 5 TABLET ORAL at 17:48

## 2024-03-04 RX ADMIN — LISINOPRIL 40 MG: 20 TABLET ORAL at 05:20

## 2024-03-04 RX ADMIN — COLCHICINE 1.2 MG: 0.6 TABLET, FILM COATED ORAL at 14:35

## 2024-03-04 RX ADMIN — AMLODIPINE BESYLATE 2.5 MG: 5 TABLET ORAL at 05:20

## 2024-03-04 RX ADMIN — FAMOTIDINE 20 MG: 20 TABLET, FILM COATED ORAL at 05:20

## 2024-03-04 RX ADMIN — ANTACID TABLETS 500 MG: 500 TABLET, CHEWABLE ORAL at 17:46

## 2024-03-04 ASSESSMENT — PAIN DESCRIPTION - PAIN TYPE
TYPE: ACUTE PAIN

## 2024-03-04 ASSESSMENT — ACTIVITIES OF DAILY LIVING (ADL): TOILETING: INDEPENDENT

## 2024-03-04 ASSESSMENT — COGNITIVE AND FUNCTIONAL STATUS - GENERAL
TOILETING: A LITTLE
PERSONAL GROOMING: A LITTLE
DRESSING REGULAR UPPER BODY CLOTHING: A LITTLE
SUGGESTED CMS G CODE MODIFIER DAILY ACTIVITY: CK
DAILY ACTIVITIY SCORE: 17
HELP NEEDED FOR BATHING: A LOT
DRESSING REGULAR LOWER BODY CLOTHING: A LOT

## 2024-03-04 ASSESSMENT — ENCOUNTER SYMPTOMS
ABDOMINAL PAIN: 0
WEAKNESS: 1
MYALGIAS: 1
SHORTNESS OF BREATH: 0

## 2024-03-04 NOTE — DISCHARGE PLANNING
Care Transition Team Assessment    Emergency Contact is friend Sarwat Capps ([phone])     CM RN met with pt at bedside to complete discharge assessment. Pt is A/Ox4 and agreeable to assessment. Pt verified information on face sheet.     - Prior to admission, pt lived alone in a studio apartment at the address provided on face sheet.   - Pt stated to be independent with ADL/IADLs and does not drive self. Per pt he uses cab vouchers and [employer] transport benefits to commute.   - Per pt, DME owned includes a FWW.   - Per pt, no O2 was owned/used prior to admission   - Pt is retired and insured through Medicare and Medicaid FFS   - PCP is Charmaine Fink M.D.   - Per pt he was on service with [facility] and requested to resume services upon discharge.     Information Source  Orientation Level: Oriented X4  Information Given By: Patient  Who is responsible for making decisions for patient? : Patient    Readmission Evaluation  Is this a readmission?: No    Elopement Risk  Legal Hold: No  Ambulatory or Self Mobile in Wheelchair: Yes  Disoriented: No  Psychiatric Symptoms: None  History of Wandering: No  Elopement this Admit: No  Vocalizing Wanting to Leave: No  Displays Behaviors, Body Language Wanting to Leave: No-Not at Risk for Elopement  Elopement Risk: Not at Risk for Elopement    Interdisciplinary Discharge Planning  Lives with - Patient's Self Care Capacity: Alone and Unable to Care For Self  Patient or legal guardian wants to designate a caregiver: No  Support Systems: Friends / Neighbors  Housing / Facility: [address]  Prior Services: Skilled Home Health Services, Meals on Wheels  Durable Medical Equipment: Other - Specify (4wheel walker)    Discharge Preparedness  What is your plan after discharge?: Home health care  What are your discharge supports?: Other (comment) (friend)  Prior Functional Level: Ambulatory, Independent with Activities of Daily Living, Independent with Medication  Management, Uses Walker    Functional Assesment  Prior Functional Level: Ambulatory, Independent with Activities of Daily Living, Independent with Medication Management, Uses Walker    Finances  Financial Barriers to Discharge: No  Prescription Coverage: Yes    Vision / Hearing Impairment  Vision Impairment : Yes  Right Eye Vision: Impaired, Wears Glasses  Left Eye Vision: Impaired, Wears Glasses  Hearing Impairment : No    Advance Directive  Advance Directive?: None    Domestic Abuse  Have you ever been the victim of abuse or violence?: No  Physical Abuse or Sexual Abuse: No  Verbal Abuse or Emotional Abuse: No  Possible Abuse/Neglect Reported to:: Not Applicable    Discharge Risks or Barriers  Discharge risks or barriers?: Lives alone, no community support  Patient risk factors: Lives alone and no community support    Anticipated Discharge Information  Discharge Disposition: Discharged to home/self care (01)  Discharge Address: [address] [place] [id number]  Discharge Contact Phone Number: [phone]

## 2024-03-04 NOTE — CARE PLAN
The patient is Stable - Low risk of patient condition declining or worsening    Shift Goals  Clinical Goals: pain control to comfor level, IV fluids  Patient Goals: pain control, rest  Family Goals: RADHA    Progress made toward(s) clinical / shift goals:       Problem: Knowledge Deficit - Standard  Goal: Patient and family/care givers will demonstrate understanding of plan of care, disease process/condition, diagnostic tests and medications  Outcome: Progressing     Problem: Pain - Standard  Goal: Alleviation of pain or a reduction in pain to the patient’s comfort goal  Outcome: Progressing     Problem: Infection - Standard  Goal: Patient will remain free from infection  Outcome: Progressing       Patient is not progressing towards the following goals:

## 2024-03-04 NOTE — THERAPY
"Occupational Therapy   Initial Evaluation     Patient Name: Fausto Martin  Age:  74 y.o., Sex:  male  Medical Record #: [id number]  Today's Date: 3/4/2024     Precautions: (P) Fall Risk  Comments: Leg bag at baseline    Assessment  Patient is 74 y.o. male admitted with urinary retention with hx of indwelling prater, recurrent UTIs. Pt reporting L knee pain/swelling as well today, RN notified. Pt presented with generalized weakness and poor activity tolerance impacting ADLs and mobility. Pt endorses minimal OOB activity since admit. At this time recommend post acute placement, anticipate pt to progress to home with HHOT, with continued OOB activity. Will follow for skilled OT services.    Plan    Occupational Therapy Initial Treatment Plan   Treatment Interventions: (P) Self Care / Activities of Daily Living, Adaptive Equipment, Therapeutic Exercises, Therapeutic Activity, Neuro Re-Education / Balance  Treatment Frequency: (P) 3 Times per Week  Duration: (P) Until Therapy Goals Met    DC Equipment Recommendations: (P) Unable to determine at this time  Discharge Recommendations: (P) Recommend post-acute placement for additional occupational therapy services prior to discharge home      03/04/24 1335   Prior Living Situation   Prior Services Skilled Home Health Services;Meals on Wheels   Housing / Facility [address]   Bathroom Set up Bathtub / Shower Combination   Equipment Owned Front-Wheel Walker;4-Wheel Walker;Quad Cane   Lives with - Patient's Self Care Capacity Alone and Unable to Care For Self   Comments Has HH OT/PT/RN. Baseline lives alone indpendently. Has assist with driving   Prior Level of ADL Function   Self Feeding Independent   Grooming / Hygiene Independent   Bathing Independent   Dressing Independent   Toileting Independent   Comments \"takes some time, but I get it done\"   Prior Level of IADL Function   Medication Management Independent   Laundry Independent   Kitchen Mobility Independent "   Finances Independent   Home Management Independent   Shopping Independent   Prior Level Of Mobility Independent With Device in Community   Driving / Transportation Relatives / Others Provide Transportation   Precautions   Precautions Fall Risk   Pain 0 - 10 Group   Therapist Pain Assessment   (pain in L knee)   Cognition    Cognition / Consciousness WDL   Comments pleasant/agreeable   Active ROM Upper Body   Active ROM Upper Body  WDL   Strength Upper Body   Upper Body Strength  X   Gross Strength Generalized Weakness, Equal Bilaterally.    Balance Assessment   Sitting Balance (Static) Fair   Sitting Balance (Dynamic) Fair   Standing Balance (Static) Fair   Standing Balance (Dynamic) Fair   Weight Shift Sitting Fair   Weight Shift Standing Fair   Bed Mobility    Supine to Sit Supervised   Scooting Supervised   ADL Assessment   Grooming Standby Assist;Seated   Upper Body Dressing Minimal Assist  (gown change)   Lower Body Dressing Maximal Assist  (difficulty reaching feet)   How much help from another person does the patient currently need...   6 Clicks Daily Activity Score 17   Functional Mobility   Sit to Stand Minimal Assist   Bed, Chair, Wheelchair Transfer Minimal Assist   Patient / Family Goals   Patient / Family Goal #1 to get stronger   Short Term Goals   Short Term Goal # 1 Pt will demo toileting hygiene SPV   Short Term Goal # 2 Pt will complete LB dressing SPV   Short Term Goal # 3 Pt will complete standing G/H routine SPV   Education Group   Role of Occupational Therapist Patient Response Patient;Acceptance;Explanation   Occupational Therapy Initial Treatment Plan    Treatment Interventions Self Care / Activities of Daily Living;Adaptive Equipment;Therapeutic Exercises;Therapeutic Activity;Neuro Re-Education / Balance   Treatment Frequency 3 Times per Week   Duration Until Therapy Goals Met   Problem List   Problem List Decreased Active Daily Living Skills;Decreased Homemaking Skills;Decreased Upper  Extremity Strength Right;Decreased Upper Extremity Strength Left;Decreased Activity Tolerance;Decreased Functional Mobility;Impaired Postural Control / Balance   Anticipated Discharge Equipment and Recommendations   DC Equipment Recommendations Unable to determine at this time   Discharge Recommendations Recommend post-acute placement for additional occupational therapy services prior to discharge home

## 2024-03-04 NOTE — PROGRESS NOTES
Received report, assumed pt care. Pt a&o 4, VSS on RA, Assessment completed. Resting comfortably in bed with call light, bedside table in reach. No c/o at this time. Side rails up 2/4. Denies pain. Mccabe catheter in place, verified indication. Instructed to use call light when needing to get OOB verbalized understanding. Bed alarm on, bed in low position. Will continue to monitor.

## 2024-03-04 NOTE — DISCHARGE PLANNING
Case Management Discharge Planning    Admission Date: 2/29/2024  GMLOS: 3.3  ALOS: 3    6-Clicks ADL Score: 14  6-Clicks Mobility Score: 11  PT and/or OT Eval ordered: Yes  Post-acute Referrals Ordered: Yes  Post-acute Choice Obtained: No  Has referral(s) been sent to post-acute provider:  Yes    Anticipated Discharge Dispo: Discharge Disposition: Discharged to home/self care (01)    DME Needed: No    Action(s) Taken:   [place] RN received communication from MD stating pt refused SNF and wants to go home with HH. HH orders placed by MD.     [facility] with [facility]  prior to admission. Per Hermelinda with [facility], pt can have resumption of care upon discharge.     1525  [place] RN met with pt at bedside to discuss discharge plan. Pt confirmed he would like to return home with [facility] services.     Choice form completed by the pt and faxed to [facility] for processing.   1) [facility]     Escalations Completed: None    Medically Clear: No    Next Steps:  [place] RN to follow up with medical team to discuss discharge barriers or needs.     Barriers to Discharge: Medical clearance and Pending PT Evaluation    Is the patient up for discharge tomorrow: No

## 2024-03-04 NOTE — PROGRESS NOTES
Assumed pt care with RN. Pt is A&OX4 and states he is in 8/10 pain (medication given, see MAR). Plan of care discussed, no further questions. Mccabe catheter in place. Personal belongings and call light within reach, bed alarm on.

## 2024-03-04 NOTE — PROGRESS NOTES
Hospital Medicine Daily Progress Note    Date of Service  3/4/2024    Chief Complaint  Fausto Martin is a 74 y.o. male admitted 2/29/2024 with UTI    Hospital Course  73 yo man with indwelling Mccabe catheter for urinary retention, HTN, HLD who presented with weakness and bladder spasms.  He has recurrent UTI and recently treated with Keflex, then prescribed Bactrim and his Mccabe catheter was exchanged by urology clinic.  CT renal was unremarkable for acute findings.  UA was concerning for UTI.  Urine culture grew Enterococcus he was treated with Augmentin.      Interval Problem Update  His bladder pain is doing better.  He is concerned about his weakness, has left knee pain.  He declines to have SNF, rather go home with home health.  I discussed with case management.    I have discussed this patient's plan of care and discharge plan at IDT rounds today with Case Management, Nursing, Nursing leadership, and other members of the IDT team.    Consultants/Specialty  none    Code Status  Full Code    Disposition  The patient is not medically cleared for discharge to home or a post-acute facility.  Anticipate discharge to: home with organized home healthcare and close outpatient follow-up    I have placed the appropriate orders for post-discharge needs.    Review of Systems  Review of Systems   Constitutional:  Negative for malaise/fatigue.   Respiratory:  Negative for shortness of breath.    Cardiovascular:  Negative for chest pain.   Gastrointestinal:  Negative for abdominal pain.   Musculoskeletal:  Positive for joint pain and myalgias.   Neurological:  Positive for weakness.        Physical Exam  Temp:  [36.1 °C (97 °F)-37.3 °C (99.1 °F)] 36.1 °C (97 °F)  Pulse:  [81-91] 86  Resp:  [14-32] 18  BP: (130-140)/(79-90) 132/79  SpO2:  [93 %-96 %] 93 %    Physical Exam  Vitals and nursing note reviewed.   Constitutional:       General: He is not in acute distress.     Appearance: He is not toxic-appearing.   HENT:       Head: Normocephalic.      Mouth/Throat:      Mouth: Mucous membranes are moist.   Eyes:      General:         Right eye: No discharge.         Left eye: No discharge.   Cardiovascular:      Rate and Rhythm: Normal rate and regular rhythm.   Pulmonary:      Effort: Pulmonary effort is normal. No respiratory distress.      Breath sounds: No wheezing or rales.   Abdominal:      Palpations: Abdomen is soft.      Tenderness: There is no abdominal tenderness.   Musculoskeletal:      Cervical back: Neck supple.      Comments: Left knee edematous compared to right with increased warmth, no overlying erythema, full range of motion   Skin:     General: Skin is warm and dry.   Neurological:      Mental Status: He is alert and oriented to person, place, and time.         Fluids    Intake/Output Summary (Last 24 hours) at 3/4/2024 1329  Last data filed at 3/4/2024 1027  Gross per 24 hour   Intake 2600 ml   Output 2900 ml   Net -300 ml       Laboratory  Recent Labs     03/02/24  0052 03/04/24  0045   WBC 10.0 10.2   RBC 3.83* 3.84*   HEMOGLOBIN 12.5* 12.4*   HEMATOCRIT 35.1* 35.7*   MCV 91.6 93.0   MCH 32.6 32.3   MCHC 35.6 34.7   RDW 42.0 42.9   PLATELETCT 136* 148*   MPV 10.6 10.8     Recent Labs     03/02/24  0052 03/04/24  0045   SODIUM 136 132*   POTASSIUM 4.0 4.3   CHLORIDE 105 102   CO2 19* 19*   GLUCOSE 133* 142*   BUN 14 15   CREATININE 0.90 0.95   CALCIUM 8.2* 8.2*                   Imaging  CT-RENAL COLIC EVALUATION(A/P W/O)   Final Result      1. No urinary tract calculus identified. No renal collecting system dilatation.      2. No evidence of inflammatory change in the abdomen or pelvis. The study is limited due to nonuse of intravenous contrast.      3. Stable simple appearing right renal cortical cysts. No further imaging follow-up is warranted as per consensus guidelines based on imaging criteria.      4. Bladder decompressed by an indwelling Mccabe catheter.      DX-CHEST-PORTABLE (1 VIEW)   Final Result       No acute cardiac or pulmonary abnormalities are identified.           Assessment/Plan  * UTI (urinary tract infection) catheter associated- (present on admission)  Assessment & Plan  Urine grew Enterococcus, complete course on Augmentin  Mccabe catheter was exchanged, will need to follow-up with urology clinic    GERD (gastroesophageal reflux disease)- (present on admission)  Assessment & Plan  Denies pain, on Pepcid and Tums    Peripheral neuropathy- (present on admission)  Assessment & Plan  Continue gabapentin    Generalized weakness- (present on admission)  Assessment & Plan  TSH, CPK, vitamin B-12 WNL  Vitamin D pending  Does have left knee pain and swelling concerning for gout flare.  Started on colchicine  PT OT    Hypertension- (present on admission)  Assessment & Plan  Blood pressure improved.  Continue lisinopril and amlodipine    Bilirubinemia- (present on admission)  Assessment & Plan  Level normalized on recheck    Leukocytosis- (present on admission)  Assessment & Plan  WBC has normalized, likely reactive         VTE prophylaxis:    enoxaparin ppx      I have performed a physical exam and reviewed and updated ROS and Plan today (3/4/2024). In review of yesterday's note (3/3/2024), there are no changes except as documented above.

## 2024-03-05 LAB
1,25(OH)2D3 SERPL-MCNC: 12.8 PG/ML (ref 19.9–79.3)
ALBUMIN SERPL BCP-MCNC: 2.8 G/DL (ref 3.2–4.9)
BACTERIA BLD CULT: NORMAL
BACTERIA BLD CULT: NORMAL
BUN SERPL-MCNC: 18 MG/DL (ref 8–22)
CALCIUM ALBUM COR SERPL-MCNC: 9.3 MG/DL (ref 8.5–10.5)
CALCIUM SERPL-MCNC: 8.3 MG/DL (ref 8.5–10.5)
CHLORIDE SERPL-SCNC: 101 MMOL/L (ref 96–112)
CO2 SERPL-SCNC: 21 MMOL/L (ref 20–33)
CREAT SERPL-MCNC: 1.17 MG/DL (ref 0.5–1.4)
GFR SERPLBLD CREATININE-BSD FMLA CKD-EPI: 65 ML/MIN/1.73 M 2
GLUCOSE SERPL-MCNC: 137 MG/DL (ref 65–99)
PHOSPHATE SERPL-MCNC: 2.7 MG/DL (ref 2.5–4.5)
POTASSIUM SERPL-SCNC: 4.1 MMOL/L (ref 3.6–5.5)
SIGNIFICANT IND 70042: NORMAL
SIGNIFICANT IND 70042: NORMAL
SITE SITE: NORMAL
SITE SITE: NORMAL
SODIUM SERPL-SCNC: 133 MMOL/L (ref 135–145)
SOURCE SOURCE: NORMAL
SOURCE SOURCE: NORMAL

## 2024-03-05 PROCEDURE — 700102 HCHG RX REV CODE 250 W/ 637 OVERRIDE(OP): Performed by: INTERNAL MEDICINE

## 2024-03-05 PROCEDURE — A9270 NON-COVERED ITEM OR SERVICE: HCPCS | Performed by: INTERNAL MEDICINE

## 2024-03-05 PROCEDURE — 99232 SBSQ HOSP IP/OBS MODERATE 35: CPT | Performed by: INTERNAL MEDICINE

## 2024-03-05 PROCEDURE — 770006 HCHG ROOM/CARE - MED/SURG/GYN SEMI*

## 2024-03-05 PROCEDURE — A9270 NON-COVERED ITEM OR SERVICE: HCPCS | Performed by: STUDENT IN AN ORGANIZED HEALTH CARE EDUCATION/TRAINING PROGRAM

## 2024-03-05 PROCEDURE — 80069 RENAL FUNCTION PANEL: CPT

## 2024-03-05 PROCEDURE — 700111 HCHG RX REV CODE 636 W/ 250 OVERRIDE (IP): Mod: JZ | Performed by: INTERNAL MEDICINE

## 2024-03-05 PROCEDURE — 700102 HCHG RX REV CODE 250 W/ 637 OVERRIDE(OP): Performed by: STUDENT IN AN ORGANIZED HEALTH CARE EDUCATION/TRAINING PROGRAM

## 2024-03-05 RX ADMIN — AMOXICILLIN AND CLAVULANATE POTASSIUM 1 TABLET: 875; 125 TABLET, FILM COATED ORAL at 05:54

## 2024-03-05 RX ADMIN — OXYCODONE 5 MG: 5 TABLET ORAL at 13:54

## 2024-03-05 RX ADMIN — TAMSULOSIN HYDROCHLORIDE 0.4 MG: 0.4 CAPSULE ORAL at 08:00

## 2024-03-05 RX ADMIN — FAMOTIDINE 20 MG: 20 TABLET, FILM COATED ORAL at 17:50

## 2024-03-05 RX ADMIN — OXYCODONE 5 MG: 5 TABLET ORAL at 19:58

## 2024-03-05 RX ADMIN — ANTACID TABLETS 500 MG: 500 TABLET, CHEWABLE ORAL at 17:50

## 2024-03-05 RX ADMIN — ENOXAPARIN SODIUM 40 MG: 100 INJECTION SUBCUTANEOUS at 17:50

## 2024-03-05 RX ADMIN — COLCHICINE 0.6 MG: 0.6 TABLET, FILM COATED ORAL at 05:54

## 2024-03-05 RX ADMIN — AMLODIPINE BESYLATE 2.5 MG: 5 TABLET ORAL at 05:54

## 2024-03-05 RX ADMIN — DOCUSATE SODIUM 50 MG AND SENNOSIDES 8.6 MG 2 TABLET: 8.6; 5 TABLET, FILM COATED ORAL at 17:50

## 2024-03-05 RX ADMIN — FAMOTIDINE 20 MG: 20 TABLET, FILM COATED ORAL at 05:54

## 2024-03-05 RX ADMIN — AMOXICILLIN AND CLAVULANATE POTASSIUM 1 TABLET: 875; 125 TABLET, FILM COATED ORAL at 17:50

## 2024-03-05 RX ADMIN — LISINOPRIL 40 MG: 20 TABLET ORAL at 05:54

## 2024-03-05 RX ADMIN — OXYCODONE 5 MG: 5 TABLET ORAL at 05:55

## 2024-03-05 ASSESSMENT — PAIN DESCRIPTION - PAIN TYPE
TYPE: ACUTE PAIN

## 2024-03-05 ASSESSMENT — ENCOUNTER SYMPTOMS
ABDOMINAL PAIN: 0
MYALGIAS: 1
SHORTNESS OF BREATH: 0
WEAKNESS: 1

## 2024-03-05 NOTE — CARE PLAN
The patient is Stable - Low risk of patient condition declining or worsening      Clinical Goals: Mccabe care, pain control, ambulation  Patient Goals: Comfort and rest  Family Goals: RADHA    Progress made toward(s) clinical / shift goals: Pt ambulated 50 feet in hallway. Maintained free of falls.     Patient is not progressing towards the following goals: N/A      Problem: Knowledge Deficit - Standard  Goal: Patient and family/care givers will demonstrate understanding of plan of care, disease process/condition, diagnostic tests and medications  Outcome: Progressing     Problem: Fall Risk  Goal: Patient will remain free from falls  Outcome: Progressing     Problem: Pain - Standard  Goal: Alleviation of pain or a reduction in pain to the patient’s comfort goal  Outcome: Progressing

## 2024-03-05 NOTE — CARE PLAN
The patient is Stable - Low risk of patient condition declining or worsening    Shift Goals Sit in chair for 20 minutes   Clinical Goals: Placement  Patient Goals: Rest  Family Goals: RADHA    Progress made toward(s) clinical / shift goals:  Up with OT today. Up in chair, tolerated well     Patient is not progressing towards the following goals:N/A

## 2024-03-05 NOTE — PROGRESS NOTES
Received alert and oriented x 4. Check vitals sign and recorded accordingly and due med given per MAR. Monitor sign and symptoms of respiratory distress and treatment given accordingly per MAR.Medicated per MAR and reassessed every 2 hours per protocol. Call light within reach. Bed alarm in placed. Needs attended. Continue to monitor./76   Pulse 87   Temp 36.6 °C (97.9 °F) (Temporal)   Resp 18   Ht 1.829 m (6')   Wt 95.5 kg (210 lb 8.6 oz)   SpO2 96%   BMI 28.55 kg/m² .

## 2024-03-05 NOTE — PROGRESS NOTES
Received report, assumed pt care. Pt a&o 4, VSS on RA, Assessment completed. Pt has pain bilaterally in knees.  Resting comfortably in bed with call light, bedside table in reach. No c/o at this time. Side rails up 2/4. Instructed to use call light when needing to get OOB verbalized understanding. Bed alarm on, bed in low position. Will continue to monitor.

## 2024-03-05 NOTE — PROGRESS NOTES
Hospital Medicine Daily Progress Note    Date of Service  3/5/2024    Chief Complaint  Fausto Martin is a 74 y.o. male admitted 2/29/2024 with UTI    Hospital Course  75 yo man with indwelling Mccabe catheter for urinary retention, HTN, HLD who presented with weakness and bladder spasms.  He has recurrent UTI and recently treated with Keflex, then prescribed Bactrim and his Mccabe catheter was exchanged by urology clinic.  CT renal was unremarkable for acute findings.  UA was concerning for UTI.  Urine culture grew Enterococcus he was treated with Augmentin.      Interval Problem Update  3/4 - His bladder pain is doing better.  He is concerned about his weakness, has left knee pain.  He declines to have SNF, rather go home with home health.  I discussed with case management.    3/5 -PT is recommending SNF, generalized weakness.  He was able to walk around his room yesterday.  He says his left knee is feeling a little better.  I discussed with nursing team, will continue to ambulate him daily in anticipation to discharge home.    I have discussed this patient's plan of care and discharge plan at IDT rounds today with Case Management, Nursing, Nursing leadership, and other members of the IDT team.    Consultants/Specialty  none    Code Status  Full Code    Disposition  The patient is not medically cleared for discharge to home or a post-acute facility.  Anticipate discharge to: home with organized home healthcare and close outpatient follow-up    I have placed the appropriate orders for post-discharge needs.    Review of Systems  Review of Systems   Constitutional:  Negative for malaise/fatigue.   Respiratory:  Negative for shortness of breath.    Cardiovascular:  Negative for chest pain.   Gastrointestinal:  Negative for abdominal pain.   Musculoskeletal:  Positive for joint pain and myalgias.   Neurological:  Positive for weakness.        Physical Exam  Temp:  [36.3 °C (97.3 °F)-36.9 °C (98.4 °F)] 36.9 °C (98.4  °F)  Pulse:  [76-87] 76  Resp:  [16-18] 16  BP: (118-131)/(76-85) 119/80  SpO2:  [91 %-96 %] 91 %    Physical Exam  Vitals and nursing note reviewed.   Constitutional:       General: He is not in acute distress.     Appearance: He is not toxic-appearing.   HENT:      Head: Normocephalic.      Mouth/Throat:      Mouth: Mucous membranes are moist.   Eyes:      General:         Right eye: No discharge.         Left eye: No discharge.   Cardiovascular:      Rate and Rhythm: Normal rate and regular rhythm.   Pulmonary:      Effort: Pulmonary effort is normal. No respiratory distress.      Breath sounds: No wheezing or rales.   Abdominal:      Palpations: Abdomen is soft.      Tenderness: There is no abdominal tenderness.   Musculoskeletal:      Cervical back: Neck supple.      Comments: Left knee edematous compared to right with increased warmth, no overlying erythema, left knee strength with flexion extension improved from yesterday   Skin:     General: Skin is warm and dry.   Neurological:      Mental Status: He is alert and oriented to person, place, and time.         Fluids    Intake/Output Summary (Last 24 hours) at 3/5/2024 1354  Last data filed at 3/5/2024 0900  Gross per 24 hour   Intake 240 ml   Output 1850 ml   Net -1610 ml       Laboratory  Recent Labs     03/04/24  0045   WBC 10.2   RBC 3.84*   HEMOGLOBIN 12.4*   HEMATOCRIT 35.7*   MCV 93.0   MCH 32.3   MCHC 34.7   RDW 42.9   PLATELETCT 148*   MPV 10.8     Recent Labs     03/04/24  0045 03/05/24  0203   SODIUM 132* 133*   POTASSIUM 4.3 4.1   CHLORIDE 102 101   CO2 19* 21   GLUCOSE 142* 137*   BUN 15 18   CREATININE 0.95 1.17   CALCIUM 8.2* 8.3*                   Imaging  CT-RENAL COLIC EVALUATION(A/P W/O)   Final Result      1. No urinary tract calculus identified. No renal collecting system dilatation.      2. No evidence of inflammatory change in the abdomen or pelvis. The study is limited due to nonuse of intravenous contrast.      3. Stable simple  appearing right renal cortical cysts. No further imaging follow-up is warranted as per consensus guidelines based on imaging criteria.      4. Bladder decompressed by an indwelling Mccabe catheter.      DX-CHEST-PORTABLE (1 VIEW)   Final Result      No acute cardiac or pulmonary abnormalities are identified.           Assessment/Plan  * UTI (urinary tract infection) catheter associated- (present on admission)  Assessment & Plan  Urine grew Enterococcus, complete course on Augmentin  Mccabe catheter was exchanged, will need to follow-up with urology clinic    GERD (gastroesophageal reflux disease)- (present on admission)  Assessment & Plan  Denies pain, on Pepcid and Tums    Peripheral neuropathy- (present on admission)  Assessment & Plan  Continue gabapentin    Generalized weakness- (present on admission)  Assessment & Plan  TSH, CPK, vitamin B-12 WNL  Vitamin D pending  Does have left knee pain and swelling concerning for gout flare and seems to be improving with colchicine, continue  PT OT to improve his strength to go home with home health    Hypertension- (present on admission)  Assessment & Plan  Blood pressure improved.  Continue lisinopril and amlodipine    Bilirubinemia- (present on admission)  Assessment & Plan  Level normalized on recheck    Leukocytosis- (present on admission)  Assessment & Plan  WBC has normalized, likely reactive         VTE prophylaxis:    enoxaparin ppx      I have performed a physical exam and reviewed and updated ROS and Plan today (3/5/2024). In review of yesterday's note (3/4/2024), there are no changes except as documented above.

## 2024-03-05 NOTE — CARE PLAN
The patient is Stable - Low risk of patient condition declining or worsening    Shift Goals  Clinical Goals: pain management and placement  Patient Goals: Rest and sleep  Family Goals: RADHA    Progress made toward(s) clinical / shift goals:  offered pain medication and slept well after medicated. Awaiting placement.

## 2024-03-06 ENCOUNTER — PHARMACY VISIT (OUTPATIENT)
Dept: PHARMACY | Facility: MEDICAL CENTER | Age: 74
End: 2024-03-06
Payer: COMMERCIAL

## 2024-03-06 VITALS
SYSTOLIC BLOOD PRESSURE: 105 MMHG | HEART RATE: 96 BPM | BODY MASS INDEX: 28.52 KG/M2 | WEIGHT: 210.54 LBS | OXYGEN SATURATION: 96 % | TEMPERATURE: 96.6 F | DIASTOLIC BLOOD PRESSURE: 76 MMHG | RESPIRATION RATE: 20 BRPM | HEIGHT: 72 IN

## 2024-03-06 LAB
ALBUMIN SERPL BCP-MCNC: 3.3 G/DL (ref 3.2–4.9)
BUN SERPL-MCNC: 20 MG/DL (ref 8–22)
CALCIUM ALBUM COR SERPL-MCNC: 9.2 MG/DL (ref 8.5–10.5)
CALCIUM SERPL-MCNC: 8.6 MG/DL (ref 8.5–10.5)
CHLORIDE SERPL-SCNC: 102 MMOL/L (ref 96–112)
CO2 SERPL-SCNC: 23 MMOL/L (ref 20–33)
CREAT SERPL-MCNC: 1.13 MG/DL (ref 0.5–1.4)
GFR SERPLBLD CREATININE-BSD FMLA CKD-EPI: 68 ML/MIN/1.73 M 2
GLUCOSE SERPL-MCNC: 110 MG/DL (ref 65–99)
PHOSPHATE SERPL-MCNC: 3.1 MG/DL (ref 2.5–4.5)
POTASSIUM SERPL-SCNC: 4 MMOL/L (ref 3.6–5.5)
SODIUM SERPL-SCNC: 135 MMOL/L (ref 135–145)

## 2024-03-06 PROCEDURE — A9270 NON-COVERED ITEM OR SERVICE: HCPCS | Performed by: STUDENT IN AN ORGANIZED HEALTH CARE EDUCATION/TRAINING PROGRAM

## 2024-03-06 PROCEDURE — 97530 THERAPEUTIC ACTIVITIES: CPT

## 2024-03-06 PROCEDURE — 80069 RENAL FUNCTION PANEL: CPT

## 2024-03-06 PROCEDURE — A9270 NON-COVERED ITEM OR SERVICE: HCPCS | Performed by: INTERNAL MEDICINE

## 2024-03-06 PROCEDURE — 700102 HCHG RX REV CODE 250 W/ 637 OVERRIDE(OP): Performed by: INTERNAL MEDICINE

## 2024-03-06 PROCEDURE — RXMED WILLOW AMBULATORY MEDICATION CHARGE: Performed by: INTERNAL MEDICINE

## 2024-03-06 PROCEDURE — 97535 SELF CARE MNGMENT TRAINING: CPT

## 2024-03-06 PROCEDURE — 700102 HCHG RX REV CODE 250 W/ 637 OVERRIDE(OP): Performed by: STUDENT IN AN ORGANIZED HEALTH CARE EDUCATION/TRAINING PROGRAM

## 2024-03-06 PROCEDURE — 99239 HOSP IP/OBS DSCHRG MGMT >30: CPT | Performed by: INTERNAL MEDICINE

## 2024-03-06 RX ORDER — BISACODYL 10 MG
10 SUPPOSITORY, RECTAL RECTAL ONCE
Status: COMPLETED | OUTPATIENT
Start: 2024-03-06 | End: 2024-03-06

## 2024-03-06 RX ORDER — COLCHICINE 0.6 MG/1
0.6 TABLET ORAL DAILY
Qty: 4 TABLET | Refills: 0 | Status: SHIPPED | OUTPATIENT
Start: 2024-03-07 | End: 2024-03-11

## 2024-03-06 RX ORDER — OXYCODONE HYDROCHLORIDE AND ACETAMINOPHEN 5; 325 MG/1; MG/1
1 TABLET ORAL EVERY 6 HOURS PRN
Qty: 10 TABLET | Refills: 0 | Status: SHIPPED | OUTPATIENT
Start: 2024-03-06 | End: 2024-03-11

## 2024-03-06 RX ORDER — AMOXICILLIN AND CLAVULANATE POTASSIUM 875; 125 MG/1; MG/1
1 TABLET, FILM COATED ORAL EVERY 12 HOURS
Qty: 6 TABLET | Refills: 0 | Status: ACTIVE | OUTPATIENT
Start: 2024-03-06 | End: 2024-03-09

## 2024-03-06 RX ORDER — BISACODYL 10 MG
10 SUPPOSITORY, RECTAL RECTAL
Qty: 10 SUPPOSITORY | Refills: 0 | Status: SHIPPED | OUTPATIENT
Start: 2024-03-06 | End: 2024-03-30

## 2024-03-06 RX ORDER — VITAMIN B COMPLEX
1000 TABLET ORAL DAILY
Status: DISCONTINUED | OUTPATIENT
Start: 2024-03-06 | End: 2024-03-06 | Stop reason: HOSPADM

## 2024-03-06 RX ADMIN — COLCHICINE 0.6 MG: 0.6 TABLET, FILM COATED ORAL at 05:18

## 2024-03-06 RX ADMIN — BISACODYL 10 MG: 10 SUPPOSITORY RECTAL at 09:06

## 2024-03-06 RX ADMIN — OXYCODONE 5 MG: 5 TABLET ORAL at 00:46

## 2024-03-06 RX ADMIN — LISINOPRIL 40 MG: 20 TABLET ORAL at 05:18

## 2024-03-06 RX ADMIN — TAMSULOSIN HYDROCHLORIDE 0.4 MG: 0.4 CAPSULE ORAL at 09:06

## 2024-03-06 RX ADMIN — FAMOTIDINE 20 MG: 20 TABLET, FILM COATED ORAL at 05:18

## 2024-03-06 RX ADMIN — AMOXICILLIN AND CLAVULANATE POTASSIUM 1 TABLET: 875; 125 TABLET, FILM COATED ORAL at 05:18

## 2024-03-06 RX ADMIN — Medication 1000 UNITS: at 09:06

## 2024-03-06 ASSESSMENT — GAIT ASSESSMENTS
DISTANCE (FEET): 100
ASSISTIVE DEVICE: 4 WHEEL WALKER
GAIT LEVEL OF ASSIST: STANDBY ASSIST

## 2024-03-06 ASSESSMENT — COGNITIVE AND FUNCTIONAL STATUS - GENERAL
MOVING TO AND FROM BED TO CHAIR: A LITTLE
TURNING FROM BACK TO SIDE WHILE IN FLAT BAD: A LITTLE
MOBILITY SCORE: 18
STANDING UP FROM CHAIR USING ARMS: A LITTLE
SUGGESTED CMS G CODE MODIFIER MOBILITY: CK
WALKING IN HOSPITAL ROOM: A LITTLE
MOVING FROM LYING ON BACK TO SITTING ON SIDE OF FLAT BED: A LITTLE
CLIMB 3 TO 5 STEPS WITH RAILING: A LITTLE

## 2024-03-06 ASSESSMENT — PAIN DESCRIPTION - PAIN TYPE
TYPE: ACUTE PAIN

## 2024-03-06 NOTE — DISCHARGE INSTRUCTIONS
Discharge Instructions per Julianne St M.D.    DIAGNOSIS:   You were treated for urine infection, please complete the antibiotic Augmentin.  Please follow-up with urology regarding your Mccabe catheter.  You have acute gout flare of your left knee, I have started you on colchicine, continue the medication for the pain.  If you you need additional pain medication, you can take Percocet/oxycodone as needed.  It can cause constipation.  You can take laxatives or use bisacodyl suppository as needed.  No drinking alcohol or driving while on Percocet.  You were also low on vitamin D, I recommend you take a supplement daily.  Have your primary care doctor recheck your vitamin D level.

## 2024-03-06 NOTE — DISCHARGE SUMMARY
Discharge Summary    CHIEF COMPLAINT ON ADMISSION  Chief Complaint   Patient presents with    UTI     Pt has a catheter, was just replaced yesterday sees a urologist. Has had UTI sx on & off x 1 month, recently put back on Bactrim by his urologist yesterday. Went to [facility] today as he was feeling weak. BP was 96/66, given 100mL LR by EMS. Pt very weak, difficulty walking even with walker.    Sent by MD       Reason for Admission  EMS     Admission Date  2/29/2024    CODE STATUS  Full    HPI & HOSPITAL COURSE  75 yo man with indwelling Mccabe catheter for urinary retention, HTN, HLD who presented with weakness and bladder spasms. He has recurrent UTI and recently treated with Keflex, then prescribed Bactrim and his Mccabe catheter was exchanged by urology clinic. CT renal was unremarkable for acute findings. UA was concerning for UTI. Urine culture grew Enterococcus he was treated with Augmentin.  He will discharge with a Mccabe catheter and he understands to follow-up with his urologist.  Prior to discharge she had left knee pain and swelling consistent with gout and did have improvement with colchicine.  Will give him a short course of as needed oxycodone for his knee pain.  He was able to work with PT OT and elected to go home with home health.  He will continue on a course of colchicine to completion.    Therefore, he is discharged in good and stable condition to home with organized home healthcare and close outpatient follow-up.    The patient met 2-midnight criteria for an inpatient stay at the time of discharge.    Discharge Date  3/6/2024    FOLLOW UP ITEMS POST DISCHARGE  Follow-up with urology for Mccabe catheter management  Follow-up with primary care for vitamin D deficiency    DISCHARGE DIAGNOSES  Principal Problem:    UTI (urinary tract infection) catheter associated (POA: Yes)  Active Problems:    Leukocytosis (POA: Yes)    Bilirubinemia (POA: Yes)    Hypertension (POA: Yes)     Generalized weakness (POA: Yes)    Peripheral neuropathy (POA: Yes)    GERD (gastroesophageal reflux disease) (POA: Yes)  Resolved Problems:    * No resolved hospital problems. *      FOLLOW UP  [facility]  [address]  [facility] [address]  [phone]          MEDICATIONS ON DISCHARGE     Medication List        START taking these medications        Instructions   amoxicillin-clavulanate 875-125 MG Tabs  Commonly known as: Augmentin   Take 1 Tablet by mouth every 12 hours for 3 days.  Dose: 1 Tablet     bisacodyl 10 MG Supp  Commonly known as: Dulcolax   Insert 1 Suppository into the rectum 1 time a day as needed (Constipation).  Dose: 10 mg     colchicine 0.6 MG Tabs  Start taking on: March 7, 2024  Commonly known as: Colcrys   Take 1 Tablet by mouth every day for 4 days.  Dose: 0.6 mg     oxyCODONE-acetaminophen 5-325 MG Tabs  Commonly known as: Percocet   Take 1 Tablet by mouth every 6 hours as needed for Severe Pain for up to 5 days.  Dose: 1 Tablet     vitamin D 1000 UNIT Tabs  Commonly known as: Cholecalciferol   Take 1 Tablet by mouth every day.  Dose: 1,000 Units            CONTINUE taking these medications        Instructions   ACETAMIN PO   Take 2 Tablets by mouth 1 time a day as needed (mild pain).  Dose: 2 Tablet     amLODIPine 5 MG Tabs  Commonly known as: Norvasc   Take 2.5 mg by mouth every day. 1/2 tablet=2.5mg  Dose: 2.5 mg     atorvastatin 20 MG Tabs  Commonly known as: Lipitor   Take 20 mg by mouth every evening.  Dose: 20 mg     gabapentin 100 MG Caps  Commonly known as: Neurontin   Take 300 mg by mouth 1 time a day as needed (pain). 3 capsules=300mg  Dose: 300 mg     Linzess 145 MCG Caps  Generic drug: linaCLOtide   Take 1 Capsule by mouth every morning.  Dose: 1 Capsule     lisinopril 40 MG tablet  Commonly known as: Prinivil   Take 40 mg by mouth every day.  Dose: 40 mg     tamsulosin 0.4 MG capsule  Commonly known as: Flomax   Take 1 Capsule by mouth 1/2 hour  after breakfast.  Dose: 0.4 mg            STOP taking these medications      Bactrim -160 MG tablet  Generic drug: sulfamethoxazole-trimethoprim     cephALEXin 500 MG Caps  Commonly known as: Keflex              Allergies  No Known Allergies    DIET  No orders of the defined types were placed in this encounter.      ACTIVITY  As tolerated.    CONSULTATIONS  none    PROCEDURES  CT-RENAL COLIC EVALUATION(A/P W/O)   Final Result      1. No urinary tract calculus identified. No renal collecting system dilatation.      2. No evidence of inflammatory change in the abdomen or pelvis. The study is limited due to nonuse of intravenous contrast.      3. Stable simple appearing right renal cortical cysts. No further imaging follow-up is warranted as per consensus guidelines based on imaging criteria.      4. Bladder decompressed by an indwelling Mccabe catheter.      DX-CHEST-PORTABLE (1 VIEW)   Final Result      No acute cardiac or pulmonary abnormalities are identified.            LABORATORY  Lab Results   Component Value Date    SODIUM 135 03/06/2024    POTASSIUM 4.0 03/06/2024    CHLORIDE 102 03/06/2024    CO2 23 03/06/2024    GLUCOSE 110 (H) 03/06/2024    BUN 20 03/06/2024    CREATININE 1.13 03/06/2024        Lab Results   Component Value Date    WBC 10.2 03/04/2024    HEMOGLOBIN 12.4 (L) 03/04/2024    HEMATOCRIT 35.7 (L) 03/04/2024    PLATELETCT 148 (L) 03/04/2024        Total time of the discharge process exceeds 32 minutes.

## 2024-03-06 NOTE — DISCHARGE PLANNING
Case Management Discharge Planning    Admission Date: 2/29/2024  GMLOS: 3.3  ALOS: 5    6-Clicks ADL Score: 17  6-Clicks Mobility Score: 11    Anticipated Discharge Dispo: Discharge Disposition: D/T to home under [facility] care in anticipation of covered skilled care (06)  Discharge Address: [address] [place] [address] [address]  [place] [address]  Discharge Contact Phone Number: [phone]    DME Needed: No    Action(s) Taken:   Pt discussed during morning IDT rounds. Per MD, pt is medically clear, pending BM prior to discharging home with HH.     [facility] accepted for resumption of care.     1047  CM RN informed by bedside RN that pt has had BM and is to have transportation provided by friend Sarwat Capps ([phone])      Escalations Completed: None     Medically Clear: Yes    Next Steps:  CM RN to follow up with medical team to discuss discharge barriers or needs.     Barriers to Discharge: None    Is the patient up for discharge tomorrow: No

## 2024-03-06 NOTE — CARE PLAN
The patient is Stable - Low risk of patient condition declining or worsening    Shift Goals  Clinical Goals: pain control to comfort level, prater care  Patient Goals: pain control, rest  Family Goals: RADHA    Progress made toward(s) clinical / shift goals:        Problem: Knowledge Deficit - Standard  Goal: Patient and family/care givers will demonstrate understanding of plan of care, disease process/condition, diagnostic tests and medications  Outcome: Progressing     Problem: Pain - Standard  Goal: Alleviation of pain or a reduction in pain to the patient’s comfort goal  Outcome: Progressing       Patient is not progressing towards the following goals:

## 2024-03-06 NOTE — THERAPY
Physical Therapy   Discharge     Patient Name: Fausto Martin  Age:  74 y.o., Sex:  male  Medical Record #: [id number]  Today's Date: 3/6/2024     Precautions  Precautions: Fall Risk    Assessment    Patient progressing with functional mobility and is eager to return home. He demonstrated adequate mobility for recommendation of DC home however he continues to present with functional weakness and impaired balance and would benefit from HH PT to address deficits. He mobilized as detailed below; reported no concerns regarding returning home following medical clearance. Patient will not be actively followed for physical therapy services at this time, however may be seen if requested by physician for 1 more visit within 30 days to address any discharge or equipment needs.      Plan    Reason for Discharge From Therapy: Discharge Secondary to Goals Met (patient declined further acute PT needs)    DC Equipment Recommendations: None (has 4WW)  Discharge Recommendations: Recommend home health for continued physical therapy services      Subjective    RN cleared patient for therapy, received in bed, agreeable, eager to mobilize and go home     Objective       03/06/24 0857   Charge Group   Charges  Yes   PT Therapeutic Activities (Units) 1   PT Self Care / Home Evaluation (Units) 1   Total Time Spent   PT Total Time Yes   PT Therapeutic Activities Time Spent (Mins) 15   PT Self Care/Home Evaluation Time Spent (Mins) 8   PT Total Time Spent (Calculated) 23   Precautions   Precautions Fall Risk   Vitals   O2 (LPM) 0   O2 Delivery Device None - Room Air   Pain 0 - 10 Group   Location Knee   Location Orientation Left   Therapist Pain Assessment   (reported improvement in knee pain)   Cognition    Cognition / Consciousness WDL   Level of Consciousness Alert   Comments appears to be at baseline; decreased insight into deficits and safety but pleasant and cooperative   Active ROM Lower Body    Comments WFL for mobility   Strength  "Lower Body   Comments gross BLE weakness but functional for mobility   Standing Lower Body Exercises   Comments instructed STS x3 from chair   Balance   Sitting Balance (Static) Fair +   Sitting Balance (Dynamic) Fair +   Standing Balance (Static) Fair   Standing Balance (Dynamic) Fair   Weight Shift Sitting Fair   Weight Shift Standing Fair   Skilled Intervention Verbal Cuing;Compensatory Strategies;Sequencing   Comments no overt LOB but difficulty with transitioning STS. used 4WW in standing   Bed Mobility    Supine to Sit Contact Guard Assist   Sit to Supine   (NT, left in chair)   Scooting Supervised   Rolling Contact Guard Assist   Skilled Intervention Verbal Cuing;Compensatory Strategies;Sequencing   Comments CGA required with HOB flat and no rail, reported he uses 4WW next to bed for leverage, reported no concerns regarding performing in home environment   Gait Analysis   Gait Level Of Assist Standby Assist   Assistive Device 4 Wheel Walker   Distance (Feet) 100   # of Times Distance was Traveled 2   Deviation   (decreased romi, clearance)   # of Stairs Climbed 0  (but demonstrated adequate strength, ROM, balance to perform 1 step into home; reported step is approximately 4\", reported no concerns)   Weight Bearing Status no restrictions   Vision Deficits Impacting Mobility NT   Skilled Intervention Verbal Cuing;Compensatory Strategies;Sequencing   Comments patient utilized 4WW seat for rest between bouts   Functional Mobility   Sit to Stand Standby Assist   Bed, Chair, Wheelchair Transfer Standby Assist   Transfer Method Stand Step   Skilled Intervention Verbal Cuing;Compensatory Strategies;Sequencing   6 Clicks Assessment - How much HELP from from another person do you currently need... (If the patient hasn't done an activity recently, how much help from another person do you think he/she would need if he/she tried?)   Turning from your back to your side while in a flat bed without using bedrails? 3 "   Moving from lying on your back to sitting on the side of a flat bed without using bedrails? 3   Moving to and from a bed to a chair (including a wheelchair)? 3   Standing up from a chair using your arms (e.g., wheelchair, or bedside chair)? 3   Walking in hospital room? 3   Climbing 3-5 steps with a railing? 3   6 clicks Mobility Score 18   Patient / Family Goals    Patient / Family Goal #1 go home   Short Term Goals    Short Term Goal # 1 in 6 visits patient will demo all bed mobility from flat surface with sup for improved independence   Goal Outcome # 1   (see bed mobility)   Short Term Goal # 2 in 6 visits patient will demo all transfers with sup and LRAD for safe DC home   Goal Outcome # 2 Goal met  (with SBA)   Short Term Goal # 3 in 6 visits patient will ambulate 200' with sup and LRAD for improved independence   Goal Outcome # 3   (see gait)   Short Term Goal # 4 in 6 visits patient will navigate 1 stairs w/LRAD sup for safe DC   Goal Outcome # 4   (see gait)   Education Group   Additional Comments education regarding safe technique for STS and progression of activity while in hospital and at home   Physical Therapy Treatment Plan   Physical Therapy Treatment Plan Modify Current Treatment Plan   Reason For Discharge Discharge Secondary to Goals Met  (patient declined further acute PT needs)   Anticipated Discharge Equipment and Recommendations   DC Equipment Recommendations None  (has 4WW)   Discharge Recommendations Recommend home health for continued physical therapy services   Interdisciplinary Plan of Care Collaboration   IDT Collaboration with  Nursing   Patient Position at End of Therapy Seated;Call Light within Reach;Tray Table within Reach;Phone within Reach   Collaboration Comments RN aware of visit, response   Session Information   Date / Session Number  3/6- goals met, DC from acute PT

## 2024-03-06 NOTE — PROGRESS NOTES
Assumed pt care with RN. Pt is A&OX4 and states he is in 10/10 pain (medication given, see MAR). Plan of care discussed, no further questions. Mccabe catheter in place. Personal belongings and call light within reach, bed alarm on.

## 2024-03-10 ENCOUNTER — HOSPITAL ENCOUNTER (EMERGENCY)
Facility: MEDICAL CENTER | Age: 74
End: 2024-03-10
Attending: EMERGENCY MEDICINE
Payer: MEDICARE

## 2024-03-10 VITALS
HEIGHT: 72 IN | DIASTOLIC BLOOD PRESSURE: 87 MMHG | WEIGHT: 210 LBS | SYSTOLIC BLOOD PRESSURE: 156 MMHG | OXYGEN SATURATION: 95 % | TEMPERATURE: 97 F | HEART RATE: 69 BPM | BODY MASS INDEX: 28.44 KG/M2 | RESPIRATION RATE: 16 BRPM

## 2024-03-10 DIAGNOSIS — T83.9XXA PROBLEM WITH FOLEY CATHETER, INITIAL ENCOUNTER (HCC): ICD-10-CM

## 2024-03-10 LAB
ALBUMIN SERPL BCP-MCNC: 3.6 G/DL (ref 3.2–4.9)
ALBUMIN/GLOB SERPL: 1.2 G/DL
ALP SERPL-CCNC: 64 U/L (ref 30–99)
ALT SERPL-CCNC: 28 U/L (ref 2–50)
ANION GAP SERPL CALC-SCNC: 11 MMOL/L (ref 7–16)
AST SERPL-CCNC: 31 U/L (ref 12–45)
BASOPHILS # BLD AUTO: 0.7 % (ref 0–1.8)
BASOPHILS # BLD: 0.05 K/UL (ref 0–0.12)
BILIRUB SERPL-MCNC: 0.7 MG/DL (ref 0.1–1.5)
BUN SERPL-MCNC: 19 MG/DL (ref 8–22)
CALCIUM ALBUM COR SERPL-MCNC: 9 MG/DL (ref 8.5–10.5)
CALCIUM SERPL-MCNC: 8.7 MG/DL (ref 8.5–10.5)
CHLORIDE SERPL-SCNC: 105 MMOL/L (ref 96–112)
CO2 SERPL-SCNC: 23 MMOL/L (ref 20–33)
CREAT SERPL-MCNC: 1.05 MG/DL (ref 0.5–1.4)
EOSINOPHIL # BLD AUTO: 0.18 K/UL (ref 0–0.51)
EOSINOPHIL NFR BLD: 2.7 % (ref 0–6.9)
ERYTHROCYTE [DISTWIDTH] IN BLOOD BY AUTOMATED COUNT: 42.9 FL (ref 35.9–50)
GFR SERPLBLD CREATININE-BSD FMLA CKD-EPI: 74 ML/MIN/1.73 M 2
GLOBULIN SER CALC-MCNC: 2.9 G/DL (ref 1.9–3.5)
GLUCOSE SERPL-MCNC: 108 MG/DL (ref 65–99)
HCT VFR BLD AUTO: 38 % (ref 42–52)
HGB BLD-MCNC: 12.9 G/DL (ref 14–18)
IMM GRANULOCYTES # BLD AUTO: 0.02 K/UL (ref 0–0.11)
IMM GRANULOCYTES NFR BLD AUTO: 0.3 % (ref 0–0.9)
LYMPHOCYTES # BLD AUTO: 0.91 K/UL (ref 1–4.8)
LYMPHOCYTES NFR BLD: 13.5 % (ref 22–41)
MCH RBC QN AUTO: 31.9 PG (ref 27–33)
MCHC RBC AUTO-ENTMCNC: 33.9 G/DL (ref 32.3–36.5)
MCV RBC AUTO: 94.1 FL (ref 81.4–97.8)
MONOCYTES # BLD AUTO: 0.56 K/UL (ref 0–0.85)
MONOCYTES NFR BLD AUTO: 8.3 % (ref 0–13.4)
NEUTROPHILS # BLD AUTO: 5 K/UL (ref 1.82–7.42)
NEUTROPHILS NFR BLD: 74.5 % (ref 44–72)
NRBC # BLD AUTO: 0 K/UL
NRBC BLD-RTO: 0 /100 WBC (ref 0–0.2)
PLATELET # BLD AUTO: 263 K/UL (ref 164–446)
PMV BLD AUTO: 10 FL (ref 9–12.9)
POTASSIUM SERPL-SCNC: 4.1 MMOL/L (ref 3.6–5.5)
PROT SERPL-MCNC: 6.5 G/DL (ref 6–8.2)
RBC # BLD AUTO: 4.04 M/UL (ref 4.7–6.1)
SODIUM SERPL-SCNC: 139 MMOL/L (ref 135–145)
WBC # BLD AUTO: 6.7 K/UL (ref 4.8–10.8)

## 2024-03-10 PROCEDURE — 80053 COMPREHEN METABOLIC PANEL: CPT

## 2024-03-10 PROCEDURE — 700105 HCHG RX REV CODE 258: Mod: UD | Performed by: EMERGENCY MEDICINE

## 2024-03-10 PROCEDURE — 36415 COLL VENOUS BLD VENIPUNCTURE: CPT

## 2024-03-10 PROCEDURE — 99285 EMERGENCY DEPT VISIT HI MDM: CPT

## 2024-03-10 PROCEDURE — 51798 US URINE CAPACITY MEASURE: CPT

## 2024-03-10 PROCEDURE — 85025 COMPLETE CBC W/AUTO DIFF WBC: CPT

## 2024-03-10 RX ORDER — SODIUM CHLORIDE, SODIUM LACTATE, POTASSIUM CHLORIDE, CALCIUM CHLORIDE 600; 310; 30; 20 MG/100ML; MG/100ML; MG/100ML; MG/100ML
1000 INJECTION, SOLUTION INTRAVENOUS ONCE
Status: COMPLETED | OUTPATIENT
Start: 2024-03-10 | End: 2024-03-10

## 2024-03-10 RX ADMIN — SODIUM CHLORIDE, POTASSIUM CHLORIDE, SODIUM LACTATE AND CALCIUM CHLORIDE 1000 ML: 600; 310; 30; 20 INJECTION, SOLUTION INTRAVENOUS at 13:54

## 2024-03-10 ASSESSMENT — COGNITIVE AND FUNCTIONAL STATUS - GENERAL
DAILY ACTIVITIY SCORE: 24
SUGGESTED CMS G CODE MODIFIER DAILY ACTIVITY: CH
MOBILITY SCORE: 24
SUGGESTED CMS G CODE MODIFIER MOBILITY: CH

## 2024-03-10 ASSESSMENT — FIBROSIS 4 INDEX: FIB4 SCORE: 1.697749375254330815

## 2024-03-10 NOTE — ED NOTES
Discharge teaching and paperwork provided and all questions/concerns answered. VSS, assessment stable and PIV removed. Patient discharged to the care of self and ambulation out of the ED.

## 2024-03-10 NOTE — ED PROVIDER NOTES
ED Provider Note    CHIEF COMPLAINT  Chief Complaint   Patient presents with    Urinary Catheter Problem       EXTERNAL RECORDS REVIEWED  Inpatient Notes from admission February 29, 2024, noted indwelling catheter UTI symptoms for a month, noted history of recurrent UTI, at that point had recurrent UTI Enterococcus treated with Augmentin    HPI/ROS  LIMITATION TO HISTORY   Select: : None  OUTSIDE HISTORIAN(S):  none    Fausto Martin is a 74 y.o. male who presents with concerns of his catheter malfunctioning.  Patient has an indwelling Mccabe catheter, he states that it was working fine however at 9:00 this morning it stops draining.  He reports it is actually draining normally again now.  He states now or previously no abdominal pain or suprapubic pain.  No flank pain.  No nausea or vomiting or diarrhea.  No fevers or chills.  No urinary discomfort dysuria or hematuria    PAST MEDICAL HISTORY   has a past medical history of High cholesterol, Hypertension, and Neuropathy.    SURGICAL HISTORY  patient denies any surgical history    FAMILY HISTORY  No family history on file.    SOCIAL HISTORY  Social History     Tobacco Use    Smoking status: Never    Smokeless tobacco: Never   Vaping Use    Vaping Use: Never used   Substance and Sexual Activity    Alcohol use: Not Currently    Drug use: Never    Sexual activity: Not on file       CURRENT MEDICATIONS  Home Medications       Reviewed by Sofia Gee R.N. (Registered Nurse) on 03/10/24 at 1314  Med List Status: Partial     Medication Last Dose Status   Acetaminophen (ACETAMIN PO)  Active   amLODIPine (NORVASC) 5 MG Tab  Active   atorvastatin (LIPITOR) 20 MG Tab  Active   bisacodyl (DULCOLAX) 10 MG Suppos  Active   colchicine (COLCRYS) 0.6 MG Tab  Active   gabapentin (NEURONTIN) 100 MG Cap  Active   linaCLOtide (LINZESS) 145 MCG Cap  Active   lisinopril (PRINIVIL) 40 MG tablet  Active   oxyCODONE-acetaminophen (PERCOCET) 5-325 MG Tab  Active   tamsulosin  (FLOMAX) 0.4 MG capsule  Active   vitamin D (CHOLECALCIFEROL) 1000 UNIT Tab  Active                    ALLERGIES  No Known Allergies    PHYSICAL EXAM  VITAL SIGNS: /81   Pulse 71   Temp 36.1 °C (96.9 °F) (Temporal)   Resp 16   Ht 1.829 m (6')   Wt 95.3 kg (210 lb)   SpO2 92%   BMI 28.48 kg/m²      Pulse ox interpretation: I interpret this pulse ox as normal.  Constitutional: Alert in no apparent distress.  HENT: Normocephalic, Atraumatic, Bilateral external ears normal. Nose normal.   Eyes: Pupils are equal and reactive. Conjunctiva normal, non-icteric.   Heart: Regular rate and rythm, no murmurs.    Lungs: Clear to auscultation bilaterally.  Abd: soft, NTTP, no mass, no pulsatile mass, non-distended, no rebound or guarding  Circumcised male genitalia, Mccabe in place, draining clear yellow urine  Skin: Warm, Dry, No erythema, No rash.   Neurologic: Alert, Grossly non-focal.   Psychiatric: Affect normal, Judgment normal, Mood normal, Appears appropriate                 DIAGNOSTIC STUDIES / PROCEDURES    Labs Reviewed   CBC WITH DIFFERENTIAL - Abnormal; Notable for the following components:       Result Value    RBC 4.04 (*)     Hemoglobin 12.9 (*)     Hematocrit 38.0 (*)     Neutrophils-Polys 74.50 (*)     Lymphocytes 13.50 (*)     Lymphs (Absolute) 0.91 (*)     All other components within normal limits   COMP METABOLIC PANEL - Abnormal; Notable for the following components:    Glucose 108 (*)     All other components within normal limits   ESTIMATED GFR         COURSE & MEDICAL DECISION MAKING      INITIAL ASSESSMENT, COURSE AND PLAN  Care Narrative: 1:30 PM  Patient is evaluated at the bedside, while there is consideration for urinary retention and obstructive nephropathy electrolyte or metabolic derangement,Now it does appear that his catheter is actually working appropriately.  Of ordered for diagnostic labs IV fluids as well as bladder scan. He has no fevers or urinary discomfort or other symptoms  to suggest UTI although this was considered    Patient is reevaluated, catheter is draining appropriately, patient without concern at this time,Updated on results he is comfortable discharge           PROBLEM LIST and medical decision making  #Indwelling Mccabe catheter.  Patient did report a transient episode with this was not draining.  It has been draining throughout his visit here, bladder scans are reassuring.  There is no finding to suggest persistent obstructive process, no hematuria noted, no findings of renal dysfunction.  He has no infectious symptoms either.  Was provided with catheter care, will follow-up with urology as outpatient    ADDITIONAL PROBLEM LIST    # Hypertension, history of  DISPOSITION AND DISCUSSIONS    Barriers to care at this time, including but not limited to:  none .     Decision tools and prescription drugs considered including, but not limited to: Medication modification reviewed and no indication for changes at this point .     The patient will return for new or worsening symptoms and is stable at the time of discharge.    The patient is referred to a primary physician for blood pressure management, diabetic screening, and for all other preventative health concerns.      DISPOSITION:  Patient will be discharged home in stable condition.    FOLLOW UP:  [facility]  [address] [place] [address]  [phone]    Schedule an appointment as soon as possible for a visit         OUTPATIENT MEDICATIONS:  New Prescriptions    No medications on file         FINAL DIAGNOSIS  1. Problem with Mccabe catheter, initial encounter (HCC)           Electronically signed by: Bismark Jurado M.D., 3/10/2024 1:29 PM

## 2024-03-10 NOTE — ED NOTES
Bladder scan = 0 mL    Mccabe appears to be working properly. Pt requesting Stat Lock be replaced, ordered, and will replace.

## 2024-03-10 NOTE — ED TRIAGE NOTES
Vitals:    03/10/24 1314   BP: 137/87   Pulse: 77   Resp: 16   Temp: 36.1 °C (96.9 °F)   SpO2: 91%     Chief Complaint   Patient presents with    Urinary Catheter Problem     Pt reports he has an indwelling catheter for an enlarged prostate. This morning after he emptied his leg bag he noticed there wasn't more urine flowing out of the prater.     He is BIB [facility]. He is alert and oriented x 4. Small amount of yellow urine currently in leg bag.

## 2024-03-14 ENCOUNTER — HOSPITAL ENCOUNTER (OUTPATIENT)
Facility: MEDICAL CENTER | Age: 74
End: 2024-03-14
Payer: MEDICARE

## 2024-03-14 LAB
APPEARANCE UR: CLEAR
BILIRUB UR QL STRIP.AUTO: NEGATIVE
COLOR UR: YELLOW
GLUCOSE UR STRIP.AUTO-MCNC: NEGATIVE MG/DL
KETONES UR STRIP.AUTO-MCNC: NEGATIVE MG/DL
LEUKOCYTE ESTERASE UR QL STRIP.AUTO: NEGATIVE
MICRO URNS: NORMAL
NITRITE UR QL STRIP.AUTO: NEGATIVE
PH UR STRIP.AUTO: 6.5 [PH] (ref 5–8)
PROT UR QL STRIP: NEGATIVE MG/DL
RBC UR QL AUTO: NEGATIVE
SP GR UR STRIP.AUTO: 1.02
UROBILINOGEN UR STRIP.AUTO-MCNC: 0.2 MG/DL

## 2024-03-14 PROCEDURE — 81003 URINALYSIS AUTO W/O SCOPE: CPT

## 2024-03-14 PROCEDURE — 87077 CULTURE AEROBIC IDENTIFY: CPT

## 2024-03-14 PROCEDURE — 87186 SC STD MICRODIL/AGAR DIL: CPT

## 2024-03-14 PROCEDURE — 87086 URINE CULTURE/COLONY COUNT: CPT

## 2024-03-16 LAB
BACTERIA UR CULT: ABNORMAL
BACTERIA UR CULT: ABNORMAL
SIGNIFICANT IND 70042: ABNORMAL
SITE SITE: ABNORMAL
SOURCE SOURCE: ABNORMAL

## 2024-03-30 ENCOUNTER — HOSPITAL ENCOUNTER (EMERGENCY)
Facility: MEDICAL CENTER | Age: 74
End: 2024-03-30
Attending: EMERGENCY MEDICINE
Payer: MEDICARE

## 2024-03-30 VITALS
HEART RATE: 77 BPM | HEIGHT: 74 IN | TEMPERATURE: 97.9 F | SYSTOLIC BLOOD PRESSURE: 150 MMHG | BODY MASS INDEX: 26.95 KG/M2 | WEIGHT: 210 LBS | DIASTOLIC BLOOD PRESSURE: 93 MMHG | OXYGEN SATURATION: 94 % | RESPIRATION RATE: 22 BRPM

## 2024-03-30 DIAGNOSIS — R33.9 URINARY RETENTION: Primary | ICD-10-CM

## 2024-03-30 LAB
APPEARANCE UR: CLEAR
BACTERIA #/AREA URNS HPF: NEGATIVE /HPF
BILIRUB UR QL STRIP.AUTO: NEGATIVE
COLOR UR: YELLOW
EPI CELLS #/AREA URNS HPF: NEGATIVE /HPF
GLUCOSE UR STRIP.AUTO-MCNC: NEGATIVE MG/DL
HYALINE CASTS #/AREA URNS LPF: ABNORMAL /LPF
KETONES UR STRIP.AUTO-MCNC: NEGATIVE MG/DL
LEUKOCYTE ESTERASE UR QL STRIP.AUTO: NEGATIVE
MICRO URNS: ABNORMAL
NITRITE UR QL STRIP.AUTO: NEGATIVE
PH UR STRIP.AUTO: 5.5 [PH] (ref 5–8)
PROT UR QL STRIP: 30 MG/DL
RBC # URNS HPF: ABNORMAL /HPF
RBC UR QL AUTO: ABNORMAL
SP GR UR STRIP.AUTO: 1.01
UROBILINOGEN UR STRIP.AUTO-MCNC: 0.2 MG/DL
WBC #/AREA URNS HPF: ABNORMAL /HPF

## 2024-03-30 PROCEDURE — 81001 URINALYSIS AUTO W/SCOPE: CPT

## 2024-03-30 PROCEDURE — 51702 INSERT TEMP BLADDER CATH: CPT

## 2024-03-30 PROCEDURE — 99284 EMERGENCY DEPT VISIT MOD MDM: CPT

## 2024-03-30 PROCEDURE — 303105 HCHG CATHETER EXTRA

## 2024-03-30 ASSESSMENT — FIBROSIS 4 INDEX: FIB4 SCORE: 2.45

## 2024-03-30 NOTE — ED PROVIDER NOTES
ER Provider Note    Scribed for Tyler Jackson Ii, M.d. by Teo Zamorano. 3/30/2024  2:56 AM    Primary Care Provider: ANTHONY MCCLURE M.D.    CHIEF COMPLAINT   Chief Complaint   Patient presents with    Blood in Urine     Pt bib ems from home. Pt had a transurethral resection done on Tuesday followed by an admission to the hospital at [facility]. Pt discharged Thursday, was able to pass urine at first but then abruptly stopped being able to pass urine. Pt then self catheterized himself, resulting in small amounts of urine, blood, and pain he describes at 6/10.     EXTERNAL RECORDS REVIEWED  Other The patient's records show that he was seen 3/26/2024 at [facility] for a transurethral resection. The patient was discharged on 3/28/2024.    HPI/ROS  LIMITATION TO HISTORY   Select: : None  OUTSIDE HISTORIAN(S):  None    Fausto Martin is a 74 y.o. male who presents to the ED complaining of bloody urine onset 2 days ago. The patient reports that he had a transurethral resection done on Tuesday at [facility]. The patient had a penile prater catheter placed at this time and it was removed on Thursday. He explains that he was urinating fine until the catheter was removed, but has since had difficulty urinating.  Had tried self cathing himself but was having significant pain doing so, did not feel like he was able to empty his bladder this way.  States associated back and bladder pain. Denies a fever. No alleviating or exacerbating factors noted.    PAST MEDICAL HISTORY  Past Medical History:   Diagnosis Date    High cholesterol     Hypertension     Neuropathy      SURGICAL HISTORY  Transurethral resection on 3/26/2024    FAMILY HISTORY  No family history noted.    SOCIAL HISTORY   reports that he has never smoked. He has never used smokeless tobacco. He reports that he does not currently use alcohol. He reports current drug use. Drug: Inhaled.    CURRENT MEDICATIONS  Discharge Medication List as of  "3/30/2024  6:37 AM        CONTINUE these medications which have NOT CHANGED    Details   tamsulosin (FLOMAX) 0.4 MG capsule Take 1 Capsule by mouth 1/2 hour after breakfast., Disp-30 Capsule, R-5, Normal      gabapentin (NEURONTIN) 100 MG Cap Take 300 mg by mouth 1 time a day as needed (pain). 3 capsules=300mg, Historical Med      amLODIPine (NORVASC) 5 MG Tab Take 2.5 mg by mouth every day. 1/2 tablet=2.5mg, Historical Med      atorvastatin (LIPITOR) 20 MG Tab Take 20 mg by mouth every evening., Historical Med      linaCLOtide (LINZESS) 145 MCG Cap Take 1 Capsule by mouth every morning., Historical Med      Acetaminophen (ACETAMIN PO) Take 2 Tablets by mouth 1 time a day as needed (mild pain)., Historical Med      lisinopril (PRINIVIL) 40 MG tablet Take 40 mg by mouth every day., Historical Med           ALLERGIES  Patient has no known allergies.    PHYSICAL EXAM  /83   Pulse 80   Resp 18   Ht 1.88 m (6' 2\")   Wt 95.3 kg (210 lb)   SpO2 92%   BMI 26.96 kg/m²     Physical Exam  Vitals and nursing note reviewed.   Constitutional:       Appearance: Normal appearance.   HENT:      Head: Normocephalic.   Cardiovascular:      Rate and Rhythm: Normal rate and regular rhythm.   Pulmonary:      Effort: Pulmonary effort is normal.   Abdominal:      Comments: Discomfort to palpation at suprapubic region   Musculoskeletal:         General: Normal range of motion.   Skin:     General: Skin is warm.   Neurological:      General: No focal deficit present.      Mental Status: He is alert.   Psychiatric:         Mood and Affect: Mood normal.       COURSE & MEDICAL DECISION MAKING     ASSESSMENT, COURSE AND PLAN  Care Narrative:     3:00 AM - The patient was seen at bedside. This is a 74 year old man who had a transurethral resection of the prostate on 3/26/2024. He had prater catheter removed 1 day ago, having difficulty urinating tonight. Denies fever. Has noticed some blood in urine earlier in the day. Will bladder " scan and anticipate replacing catheter.     5:02 AM -bladder scan did not show significant amount of urine.  Had him orally hydrate.  Rescanned and still not picking up, because of his persistent discomfort we then decided to place a Mccabe catheter to see how much urine he was holding.      5:43 AM -he had over 400 cc urine output after Mccabe placement.  Ordered UA for further evaluation.     7:08 AM  No bacteria in urine.  Are some red blood cells, urine is a very light pink color.  He agrees with plan for discharge.  He will contact his urologist, Dr. Jurado for follow-up.     PROBLEM LIST  #Urinary retention    DISPOSITION AND DISCUSSIONS  I have discussed management of the patient with the following physicians and KIM's:  None    Discussion of management with other [facility] or appropriate source(s): None     Barriers to care at this time, including but not limited to:  None .       FINAL DIAGNOSIS  1. Urinary retention Active      Teo LOPEZ (Scribe), am scribing for, and in the presence of, CIRO Guidry II.    Electronically signed by: Teo Zamorano (Scribe), 3/30/2024    ITyler II, M* personally performed the services described in this documentation, as scribed by Teo Zamorano in my presence, and it is both accurate and complete.     The note accurately reflects work and decisions made by me.  Tyler Jackson II, M.D.  3/30/2024  7:08 AM

## 2024-03-30 NOTE — ED NOTES
Patient given discharge instructions home care instructions explained, patient verbalized understanding of instructions given/patient understands importance of follow up, brought by w/c to the lobby, pt to call cab for a lift home.

## 2024-03-30 NOTE — ED TRIAGE NOTES
Chief Complaint   Patient presents with    Blood in Urine     Pt bib ems from home. Pt had a transurethral resection done on Tuesday followed by an admission to the hospital at [facility]. Pt discharged Thursday, was able to pass urine at first but then abruptly stopped being able to pass urine. Pt then self catheterized himself, resulting in small amounts of urine, blood, and pain he describes at 6/10.

## 2024-05-23 ENCOUNTER — HOSPITAL ENCOUNTER (OUTPATIENT)
Facility: MEDICAL CENTER | Age: 74
End: 2024-05-23
Attending: STUDENT IN AN ORGANIZED HEALTH CARE EDUCATION/TRAINING PROGRAM
Payer: MEDICARE

## 2024-05-26 LAB
BACTERIA UR CULT: NORMAL
SIGNIFICANT IND 70042: NORMAL
SITE SITE: NORMAL
SOURCE SOURCE: NORMAL

## 2024-07-08 ENCOUNTER — APPOINTMENT (OUTPATIENT)
Dept: RADIOLOGY | Facility: MEDICAL CENTER | Age: 74
End: 2024-07-08
Attending: EMERGENCY MEDICINE
Payer: MEDICARE

## 2024-07-08 ENCOUNTER — PHARMACY VISIT (OUTPATIENT)
Dept: PHARMACY | Facility: MEDICAL CENTER | Age: 74
End: 2024-07-08
Payer: COMMERCIAL

## 2024-07-08 ENCOUNTER — HOSPITAL ENCOUNTER (EMERGENCY)
Facility: MEDICAL CENTER | Age: 74
End: 2024-07-08
Attending: EMERGENCY MEDICINE
Payer: MEDICARE

## 2024-07-08 VITALS
OXYGEN SATURATION: 91 % | RESPIRATION RATE: 17 BRPM | TEMPERATURE: 97.9 F | BODY MASS INDEX: 27.83 KG/M2 | WEIGHT: 210 LBS | HEART RATE: 85 BPM | DIASTOLIC BLOOD PRESSURE: 96 MMHG | HEIGHT: 73 IN | SYSTOLIC BLOOD PRESSURE: 137 MMHG

## 2024-07-08 DIAGNOSIS — S20.212A CONTUSION OF LEFT CHEST WALL, INITIAL ENCOUNTER: ICD-10-CM
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
## 2024-07-08 LAB
ANION GAP SERPL CALC-SCNC: 12 MMOL/L (ref 7–16)
BASOPHILS # BLD AUTO: 0.4 % (ref 0–1.8)
BASOPHILS # BLD: 0.04 K/UL (ref 0–0.12)
BUN SERPL-MCNC: 16 MG/DL (ref 8–22)
CALCIUM SERPL-MCNC: 9.9 MG/DL (ref 8.5–10.5)
CHLORIDE SERPL-SCNC: 104 MMOL/L (ref 96–112)
CO2 SERPL-SCNC: 24 MMOL/L (ref 20–33)
CREAT SERPL-MCNC: 0.98 MG/DL (ref 0.5–1.4)
EOSINOPHIL # BLD AUTO: 0.08 K/UL (ref 0–0.51)
EOSINOPHIL NFR BLD: 0.7 % (ref 0–6.9)
ERYTHROCYTE [DISTWIDTH] IN BLOOD BY AUTOMATED COUNT: 43.4 FL (ref 35.9–50)
GFR SERPLBLD CREATININE-BSD FMLA CKD-EPI: 81 ML/MIN/1.73 M 2
GLUCOSE SERPL-MCNC: 113 MG/DL (ref 65–99)
HCT VFR BLD AUTO: 48.1 % (ref 42–52)
HGB BLD-MCNC: 17.1 G/DL (ref 14–18)
IMM GRANULOCYTES # BLD AUTO: 0.04 K/UL (ref 0–0.11)
IMM GRANULOCYTES NFR BLD AUTO: 0.4 % (ref 0–0.9)
LYMPHOCYTES # BLD AUTO: 0.95 K/UL (ref 1–4.8)
LYMPHOCYTES NFR BLD: 8.5 % (ref 22–41)
MCH RBC QN AUTO: 32.9 PG (ref 27–33)
MCHC RBC AUTO-ENTMCNC: 35.6 G/DL (ref 32.3–36.5)
MCV RBC AUTO: 92.5 FL (ref 81.4–97.8)
MONOCYTES # BLD AUTO: 0.91 K/UL (ref 0–0.85)
MONOCYTES NFR BLD AUTO: 8.2 % (ref 0–13.4)
NEUTROPHILS # BLD AUTO: 9.11 K/UL (ref 1.82–7.42)
NEUTROPHILS NFR BLD: 81.8 % (ref 44–72)
NRBC # BLD AUTO: 0 K/UL
NRBC BLD-RTO: 0 /100 WBC (ref 0–0.2)
PLATELET # BLD AUTO: 200 K/UL (ref 164–446)
PMV BLD AUTO: 10.7 FL (ref 9–12.9)
POTASSIUM SERPL-SCNC: 4.1 MMOL/L (ref 3.6–5.5)
RBC # BLD AUTO: 5.2 M/UL (ref 4.7–6.1)
SODIUM SERPL-SCNC: 140 MMOL/L (ref 135–145)
WBC # BLD AUTO: 11.1 K/UL (ref 4.8–10.8)

## 2024-07-08 PROCEDURE — 700117 HCHG RX CONTRAST REV CODE 255: Mod: UD | Performed by: EMERGENCY MEDICINE

## 2024-07-08 PROCEDURE — 700111 HCHG RX REV CODE 636 W/ 250 OVERRIDE (IP): Mod: JZ,UD | Performed by: EMERGENCY MEDICINE

## 2024-07-08 PROCEDURE — 85025 COMPLETE CBC W/AUTO DIFF WBC: CPT

## 2024-07-08 PROCEDURE — 80048 BASIC METABOLIC PNL TOTAL CA: CPT

## 2024-07-08 PROCEDURE — 74177 CT ABD & PELVIS W/CONTRAST: CPT

## 2024-07-08 PROCEDURE — RXMED WILLOW AMBULATORY MEDICATION CHARGE: Performed by: EMERGENCY MEDICINE

## 2024-07-08 PROCEDURE — 36415 COLL VENOUS BLD VENIPUNCTURE: CPT

## 2024-07-08 PROCEDURE — 99284 EMERGENCY DEPT VISIT MOD MDM: CPT

## 2024-07-08 PROCEDURE — 96375 TX/PRO/DX INJ NEW DRUG ADDON: CPT

## 2024-07-08 PROCEDURE — 96374 THER/PROPH/DIAG INJ IV PUSH: CPT | Mod: XU

## 2024-07-08 PROCEDURE — 71045 X-RAY EXAM CHEST 1 VIEW: CPT

## 2024-07-08 RX ORDER — HYDROCODONE BITARTRATE AND ACETAMINOPHEN 5; 325 MG/1; MG/1
1 TABLET ORAL EVERY 4 HOURS PRN
Qty: 15 TABLET | Refills: 0 | Status: SHIPPED | OUTPATIENT
Start: 2024-07-08 | End: 2024-07-13

## 2024-07-08 RX ORDER — ONDANSETRON 2 MG/ML
4 INJECTION INTRAMUSCULAR; INTRAVENOUS ONCE
Status: COMPLETED | OUTPATIENT
Start: 2024-07-08 | End: 2024-07-08

## 2024-07-08 RX ORDER — HYDROMORPHONE HYDROCHLORIDE 1 MG/ML
0.5 INJECTION, SOLUTION INTRAMUSCULAR; INTRAVENOUS; SUBCUTANEOUS ONCE
Status: COMPLETED | OUTPATIENT
Start: 2024-07-08 | End: 2024-07-08

## 2024-07-08 RX ADMIN — HYDROMORPHONE HYDROCHLORIDE 0.5 MG: 1 INJECTION, SOLUTION INTRAMUSCULAR; INTRAVENOUS; SUBCUTANEOUS at 11:55

## 2024-07-08 RX ADMIN — ONDANSETRON 4 MG: 2 INJECTION INTRAMUSCULAR; INTRAVENOUS at 11:55

## 2024-07-08 RX ADMIN — IOHEXOL 100 ML: 350 INJECTION, SOLUTION INTRAVENOUS at 13:04

## 2024-07-08 ASSESSMENT — PAIN DESCRIPTION - PAIN TYPE: TYPE: ACUTE PAIN

## 2024-07-08 ASSESSMENT — FIBROSIS 4 INDEX: FIB4 SCORE: 2.45

## 2024-08-06 ENCOUNTER — HOSPITAL ENCOUNTER (EMERGENCY)
Facility: MEDICAL CENTER | Age: 74
End: 2024-08-06
Attending: EMERGENCY MEDICINE
Payer: MEDICARE

## 2024-08-06 ENCOUNTER — APPOINTMENT (OUTPATIENT)
Dept: RADIOLOGY | Facility: MEDICAL CENTER | Age: 74
End: 2024-08-06
Attending: EMERGENCY MEDICINE
Payer: MEDICARE

## 2024-08-06 VITALS
DIASTOLIC BLOOD PRESSURE: 100 MMHG | OXYGEN SATURATION: 91 % | TEMPERATURE: 97.9 F | HEART RATE: 80 BPM | WEIGHT: 210.1 LBS | HEIGHT: 73 IN | RESPIRATION RATE: 18 BRPM | BODY MASS INDEX: 27.85 KG/M2 | SYSTOLIC BLOOD PRESSURE: 145 MMHG

## 2024-08-06 DIAGNOSIS — T80.1XXA INTRAVENOUS INFILTRATION, INITIAL ENCOUNTER: ICD-10-CM

## 2024-08-06 DIAGNOSIS — N39.0 ACUTE UTI: ICD-10-CM

## 2024-08-06 DIAGNOSIS — R10.84 GENERALIZED ABDOMINAL PAIN: ICD-10-CM

## 2024-08-06 DIAGNOSIS — K57.90 DIVERTICULOSIS: ICD-10-CM

## 2024-08-06 LAB
ABO GROUP BLD: NORMAL
ALBUMIN SERPL BCP-MCNC: 3.9 G/DL (ref 3.2–4.9)
ALBUMIN/GLOB SERPL: 1.3 G/DL
ALP SERPL-CCNC: 96 U/L (ref 30–99)
ALT SERPL-CCNC: 25 U/L (ref 2–50)
ANION GAP SERPL CALC-SCNC: 14 MMOL/L (ref 7–16)
APPEARANCE UR: CLEAR
APTT PPP: 28.7 SEC (ref 24.7–36)
AST SERPL-CCNC: 26 U/L (ref 12–45)
BACTERIA #/AREA URNS HPF: NEGATIVE /HPF
BASOPHILS # BLD AUTO: 0.6 % (ref 0–1.8)
BASOPHILS # BLD: 0.06 K/UL (ref 0–0.12)
BILIRUB SERPL-MCNC: 1.9 MG/DL (ref 0.1–1.5)
BILIRUB UR QL STRIP.AUTO: NEGATIVE
BLD GP AB SCN SERPL QL: NORMAL
BUN SERPL-MCNC: 18 MG/DL (ref 8–22)
CALCIUM ALBUM COR SERPL-MCNC: 9.9 MG/DL (ref 8.5–10.5)
CALCIUM SERPL-MCNC: 9.8 MG/DL (ref 8.5–10.5)
CHLORIDE SERPL-SCNC: 105 MMOL/L (ref 96–112)
CO2 SERPL-SCNC: 18 MMOL/L (ref 20–33)
COLOR UR: YELLOW
CREAT SERPL-MCNC: 1.02 MG/DL (ref 0.5–1.4)
EOSINOPHIL # BLD AUTO: 0.13 K/UL (ref 0–0.51)
EOSINOPHIL NFR BLD: 1.4 % (ref 0–6.9)
EPI CELLS #/AREA URNS HPF: NEGATIVE /HPF
ERYTHROCYTE [DISTWIDTH] IN BLOOD BY AUTOMATED COUNT: 42.4 FL (ref 35.9–50)
GFR SERPLBLD CREATININE-BSD FMLA CKD-EPI: 77 ML/MIN/1.73 M 2
GLOBULIN SER CALC-MCNC: 3.1 G/DL (ref 1.9–3.5)
GLUCOSE SERPL-MCNC: 136 MG/DL (ref 65–99)
GLUCOSE UR STRIP.AUTO-MCNC: NEGATIVE MG/DL
HCT VFR BLD AUTO: 46.3 % (ref 42–52)
HGB BLD-MCNC: 16.5 G/DL (ref 14–18)
HYALINE CASTS #/AREA URNS LPF: ABNORMAL /LPF
IMM GRANULOCYTES # BLD AUTO: 0.03 K/UL (ref 0–0.11)
IMM GRANULOCYTES NFR BLD AUTO: 0.3 % (ref 0–0.9)
INR PPP: 1.12 (ref 0.87–1.13)
KETONES UR STRIP.AUTO-MCNC: ABNORMAL MG/DL
LEUKOCYTE ESTERASE UR QL STRIP.AUTO: ABNORMAL
LIPASE SERPL-CCNC: 34 U/L (ref 11–82)
LYMPHOCYTES # BLD AUTO: 1.37 K/UL (ref 1–4.8)
LYMPHOCYTES NFR BLD: 14.4 % (ref 22–41)
MCH RBC QN AUTO: 32.2 PG (ref 27–33)
MCHC RBC AUTO-ENTMCNC: 35.6 G/DL (ref 32.3–36.5)
MCV RBC AUTO: 90.4 FL (ref 81.4–97.8)
MICRO URNS: ABNORMAL
MONOCYTES # BLD AUTO: 0.7 K/UL (ref 0–0.85)
MONOCYTES NFR BLD AUTO: 7.4 % (ref 0–13.4)
NEUTROPHILS # BLD AUTO: 7.22 K/UL (ref 1.82–7.42)
NEUTROPHILS NFR BLD: 75.9 % (ref 44–72)
NITRITE UR QL STRIP.AUTO: NEGATIVE
NRBC # BLD AUTO: 0 K/UL
NRBC BLD-RTO: 0 /100 WBC (ref 0–0.2)
PH UR STRIP.AUTO: 6 [PH] (ref 5–8)
PLATELET # BLD AUTO: 191 K/UL (ref 164–446)
PMV BLD AUTO: 10.7 FL (ref 9–12.9)
POTASSIUM SERPL-SCNC: 3.6 MMOL/L (ref 3.6–5.5)
PROT SERPL-MCNC: 7 G/DL (ref 6–8.2)
PROT UR QL STRIP: NEGATIVE MG/DL
PROTHROMBIN TIME: 14.6 SEC (ref 12–14.6)
RBC # BLD AUTO: 5.12 M/UL (ref 4.7–6.1)
RBC # URNS HPF: ABNORMAL /HPF
RBC UR QL AUTO: ABNORMAL
RH BLD: NORMAL
SODIUM SERPL-SCNC: 137 MMOL/L (ref 135–145)
SP GR UR STRIP.AUTO: 1.02
UROBILINOGEN UR STRIP.AUTO-MCNC: 0.2 MG/DL
WBC # BLD AUTO: 9.5 K/UL (ref 4.8–10.8)
WBC #/AREA URNS HPF: ABNORMAL /HPF

## 2024-08-06 PROCEDURE — 96374 THER/PROPH/DIAG INJ IV PUSH: CPT

## 2024-08-06 PROCEDURE — 83690 ASSAY OF LIPASE: CPT

## 2024-08-06 PROCEDURE — 71045 X-RAY EXAM CHEST 1 VIEW: CPT

## 2024-08-06 PROCEDURE — 86850 RBC ANTIBODY SCREEN: CPT

## 2024-08-06 PROCEDURE — 86900 BLOOD TYPING SEROLOGIC ABO: CPT

## 2024-08-06 PROCEDURE — 99285 EMERGENCY DEPT VISIT HI MDM: CPT

## 2024-08-06 PROCEDURE — 700117 HCHG RX CONTRAST REV CODE 255: Mod: UD | Performed by: EMERGENCY MEDICINE

## 2024-08-06 PROCEDURE — 74177 CT ABD & PELVIS W/CONTRAST: CPT

## 2024-08-06 PROCEDURE — 85025 COMPLETE CBC W/AUTO DIFF WBC: CPT

## 2024-08-06 PROCEDURE — A9270 NON-COVERED ITEM OR SERVICE: HCPCS | Mod: UD | Performed by: EMERGENCY MEDICINE

## 2024-08-06 PROCEDURE — 700102 HCHG RX REV CODE 250 W/ 637 OVERRIDE(OP): Mod: UD | Performed by: EMERGENCY MEDICINE

## 2024-08-06 PROCEDURE — 85730 THROMBOPLASTIN TIME PARTIAL: CPT

## 2024-08-06 PROCEDURE — 81001 URINALYSIS AUTO W/SCOPE: CPT

## 2024-08-06 PROCEDURE — 700111 HCHG RX REV CODE 636 W/ 250 OVERRIDE (IP): Mod: JZ,UD | Performed by: EMERGENCY MEDICINE

## 2024-08-06 PROCEDURE — 700105 HCHG RX REV CODE 258: Mod: UD | Performed by: EMERGENCY MEDICINE

## 2024-08-06 PROCEDURE — 85610 PROTHROMBIN TIME: CPT

## 2024-08-06 PROCEDURE — 86901 BLOOD TYPING SEROLOGIC RH(D): CPT

## 2024-08-06 PROCEDURE — 36415 COLL VENOUS BLD VENIPUNCTURE: CPT

## 2024-08-06 PROCEDURE — 80053 COMPREHEN METABOLIC PANEL: CPT

## 2024-08-06 PROCEDURE — 96375 TX/PRO/DX INJ NEW DRUG ADDON: CPT

## 2024-08-06 RX ORDER — SULFAMETHOXAZOLE/TRIMETHOPRIM 800-160 MG
1 TABLET ORAL ONCE
Status: COMPLETED | OUTPATIENT
Start: 2024-08-06 | End: 2024-08-06

## 2024-08-06 RX ORDER — POLYETHYLENE GLYCOL 3350 17 G/17G
17 POWDER, FOR SOLUTION ORAL DAILY
Qty: 7 PACKET | Refills: 0 | Status: SHIPPED | OUTPATIENT
Start: 2024-08-06 | End: 2024-08-13

## 2024-08-06 RX ORDER — ONDANSETRON 2 MG/ML
4 INJECTION INTRAMUSCULAR; INTRAVENOUS ONCE
Status: COMPLETED | OUTPATIENT
Start: 2024-08-06 | End: 2024-08-06

## 2024-08-06 RX ORDER — SULFAMETHOXAZOLE/TRIMETHOPRIM 800-160 MG
1 TABLET ORAL 2 TIMES DAILY
Qty: 14 TABLET | Refills: 0 | Status: ACTIVE | OUTPATIENT
Start: 2024-08-06 | End: 2024-08-13

## 2024-08-06 RX ORDER — MORPHINE SULFATE 4 MG/ML
4 INJECTION INTRAVENOUS ONCE
Status: COMPLETED | OUTPATIENT
Start: 2024-08-06 | End: 2024-08-06

## 2024-08-06 RX ORDER — SODIUM CHLORIDE, SODIUM LACTATE, POTASSIUM CHLORIDE, CALCIUM CHLORIDE 600; 310; 30; 20 MG/100ML; MG/100ML; MG/100ML; MG/100ML
1000 INJECTION, SOLUTION INTRAVENOUS ONCE
Status: COMPLETED | OUTPATIENT
Start: 2024-08-06 | End: 2024-08-06

## 2024-08-06 RX ADMIN — SODIUM CHLORIDE, POTASSIUM CHLORIDE, SODIUM LACTATE AND CALCIUM CHLORIDE 1000 ML: 600; 310; 30; 20 INJECTION, SOLUTION INTRAVENOUS at 15:06

## 2024-08-06 RX ADMIN — MORPHINE SULFATE 4 MG: 4 INJECTION, SOLUTION INTRAMUSCULAR; INTRAVENOUS at 15:12

## 2024-08-06 RX ADMIN — ONDANSETRON 4 MG: 2 INJECTION INTRAMUSCULAR; INTRAVENOUS at 15:12

## 2024-08-06 RX ADMIN — IOHEXOL 100 ML: 350 INJECTION, SOLUTION INTRAVENOUS at 15:52

## 2024-08-06 RX ADMIN — SULFAMETHOXAZOLE AND TRIMETHOPRIM 1 TABLET: 800; 160 TABLET ORAL at 16:57

## 2024-08-06 ASSESSMENT — FIBROSIS 4 INDEX: FIB4 SCORE: 2.17

## 2024-08-06 ASSESSMENT — PAIN DESCRIPTION - PAIN TYPE: TYPE: ACUTE PAIN

## 2024-08-06 NOTE — ED TRIAGE NOTES
"Chief Complaint   Patient presents with    Abdominal Pain     Pt reports taking laxatives for 3 years to have regular bowel movements but has been taking more than prescribed dose. Takes 5 5mg tablets every 3 days, prescribed dose 1-2 tablets. Pt reports having lower abdominal pain off and on for a couple of months. Last BM Sunday, noticed dark blood in stool. Reports feeling bloated, does endorse nausea, denies vomiting.       /88   Pulse (!) 103   Temp 36.3 °C (97.4 °F) (Temporal)   Resp 17   Ht 1.854 m (6' 1\")   Wt 95.3 kg (210 lb 1.6 oz)   SpO2 93%   BMI 27.72 kg/m²     Pt ambulatory to triage w/ above complaint.   Hx of HTN, prostate resection in march.   Educated on NPO status.   Pt placed w/ phlebotomy for lab collection.   "

## 2024-08-06 NOTE — ED PROVIDER NOTES
ER Provider Note    Scribed for Dave Zhou M.d. by Corine Suarez. 8/6/2024  2:06 PM    Primary Care Provider: ANTHONY MCCLURE M.D.    CHIEF COMPLAINT   Chief Complaint   Patient presents with    Abdominal Pain     Pt reports taking laxatives for 3 years to have regular bowel movements but has been taking more than prescribed dose. Takes 5 5mg tablets every 3 days, prescribed dose 1-2 tablets. Pt reports having lower abdominal pain off and on for a couple of months. Last BM Sunday, noticed dark blood in stool. Reports feeling bloated, does endorse nausea, denies vomiting.         HPI/ROS  LIMITATION TO HISTORY   Select: : None  OUTSIDE HISTORIAN(S):  None    Fausto Martin is a 74 y.o. male who presents to the ED complaining of intermittent lower abdominal pain onset about 2-3 months ago. The patient reports that his pain feels sharp and is exacerbated with eating. Denies any radiation. The patient reports that he has been taking laxatives for the past 3 years to be able to have normal bowel movements, but has been taking more than the prescribed dose recently. He notes that he takes about 5 5 mg tablets every 3 days and was prescribed 1-2 tablets. The patient notes that he is having a bowel movements every 3 days with his last one being two days ago. He notes that he has dark brown blood present in his stool and states that he noticed this began about a couple months ago.     The patient reports that he has some nausea, intermittent sweats, shortness of breath and coughing; but denies any vomiting, chills, chest pain, or difficulty breathing. Denies being told that he has diverticulosis.  Denies any alcohol use and notes that his last drink was about 20-30 years ago. No blood thinners. Denies any recent injuries. Denies being on oxygen at home. The patient has a history of hypertension and high cholesterol. Denies taking any blood thinners. He has no known allergies.     PAST MEDICAL HISTORY  Past Medical  "History:   Diagnosis Date    High cholesterol     Hypertension     Neuropathy      SURGICAL HISTORY  History reviewed. No pertinent surgical history.    FAMILY HISTORY  History reviewed. No pertinent family history.    SOCIAL HISTORY   reports that he has never smoked. He has never used smokeless tobacco. He reports that he does not currently use alcohol. He reports current drug use. Drug: Inhaled.    CURRENT MEDICATIONS  Previous Medications    ACETAMINOPHEN (ACETAMIN PO)    Take 2 Tablets by mouth 1 time a day as needed (mild pain).    AMLODIPINE (NORVASC) 5 MG TAB    Take 2.5 mg by mouth every day. 1/2 tablet=2.5mg    ATORVASTATIN (LIPITOR) 20 MG TAB    Take 20 mg by mouth every evening.    GABAPENTIN (NEURONTIN) 100 MG CAP    Take 300 mg by mouth 1 time a day as needed (pain). 3 capsules=300mg    LINACLOTIDE (LINZESS) 145 MCG CAP    Take 1 Capsule by mouth every morning.    LISINOPRIL (PRINIVIL) 40 MG TABLET    Take 40 mg by mouth every day.    TAMSULOSIN (FLOMAX) 0.4 MG CAPSULE    Take 1 Capsule by mouth 1/2 hour after breakfast.       ALLERGIES  Patient has no known allergies.    PHYSICAL EXAM  /88   Pulse (!) 103   Temp 36.3 °C (97.4 °F) (Temporal)   Resp 17   Ht 1.854 m (6' 1\")   Wt 95.3 kg (210 lb 1.6 oz)   SpO2 93%   BMI 27.72 kg/m²     Constitutional: Alert in no apparent distress.  HENT: No signs of trauma, Bilateral external ears normal, Nose normal. Uvula midline.   Eyes: Pupils are equal and reactive, Conjunctiva normal, Non-icteric.   Neck: Normal range of motion, No tenderness, Supple, No stridor.   Lymphatic: No lymphadenopathy noted.   Cardiovascular: Regular rate and rhythm, no murmurs.   Thorax & Lungs: Normal breath sounds, No respiratory distress, No wheezing, No chest tenderness.   Abdomen: Bowel sounds normal, Soft, No tenderness, No peritoneal signs, No masses, No pulsatile masses.   Skin: Warm, Dry, No erythema, No rash.   Back: No bony tenderness, No CVA tenderness. No C/T/L " spine step-offs or deformities, No C/T/L spine tenderness to palpation.   Extremities: Intact distal pulses, No edema, No tenderness, No cyanosis.  Musculoskeletal: Good range of motion in all major joints. No tenderness to palpation or major deformities noted.   : upon rectal exam, there was no melena present.   Neurologic: Alert , Normal motor function, Normal sensory function, No focal deficits noted.   Psychiatric: Affect normal, Judgment normal, Mood normal.     DIAGNOSTIC STUDIES    LABS  Labs Reviewed   CBC WITH DIFFERENTIAL - Abnormal; Notable for the following components:       Result Value    Neutrophils-Polys 75.90 (*)     Lymphocytes 14.40 (*)     All other components within normal limits   COMP METABOLIC PANEL - Abnormal; Notable for the following components:    Co2 18 (*)     Glucose 136 (*)     Total Bilirubin 1.9 (*)     All other components within normal limits   URINALYSIS - Abnormal; Notable for the following components:    Ketones Trace (*)     Leukocyte Esterase Small (*)     Occult Blood Small (*)     All other components within normal limits   URINE MICROSCOPIC (W/UA) - Abnormal; Notable for the following components:    WBC 20-50 (*)     RBC 2-5 (*)     Hyaline Cast 3-5 (*)     All other components within normal limits   LIPASE   COD (ADULT)   PROTHROMBIN TIME   APTT   ESTIMATED GFR      I have independently interpreted this EKG    RADIOLOGY  The attending emergency physician has independently interpreted the diagnostic imaging associated with this visit and am waiting the final reading from the radiologist.   My preliminary interpretation is a follows:     Radiologist interpretation:  CT-ABDOMEN-PELVIS WITH   Final Result      1.  No evidence of acute inflammation on this noncontrast CT.   2.  Distal colonic diverticulosis.   3.  Atherosclerotic changes. Infrarenal abdominal aortic ectasia measuring 3.0 cm.      DX-CHEST-PORTABLE (1 VIEW)   Final Result      No acute cardiopulmonary disease  evident.          COURSE & MEDICAL DECISION MAKING     ASSESSMENT, COURSE AND PLAN  Care Narrative:     2:10 PM - Patient presents to the ED with intermittent lower abdominal pain onset about 2-3 months ago. The patient reports that he noticed dark red blood in his stool a couple of months ago and adds that he has been having some nausea, intermittent sweats, shortness of breath and coughing; but denies any vomiting, chills, chest pain, or difficulty breathing. See HPI for further details. Discussed the plan of care, including ordering labs and imaging to further evaluate. The patient verbalizes their understanding and agreement to the plan of care. Patient will be treated with Zofran syringe/vial injection 4 mg and morphine injection 4 mg. Ordered for CT-abdomen-pelvis with, DX-chest, eGFR, COD, Prothrombin Time, APTT, CBC with diff, CMP, Lipase, UA to evaluate his symptoms.      #abdominal pain    CT scan of abdomen ordered in order to rule out abscess or diverticulitis    No RLQ pain, TTP or fever to suggest appendicitis  No LLQ pain or TTP or fever to suggest diverticulitis  No pain at of proportion to suggest mesenteric ischemia  No e/o rash or zoster  2:10 PM - Ordered CT-abdomen-pelvis with to further evaluate.     3:37 PM - Ordered Urine microscopic to further evaluate.     4:33 PM - Patient was reevaluated at bedside. The patient is complaining of left arm pain due to the IV. He states that he does not use oxygen at home, however states that his pain is slightly better. Discussed lab and radiology results with the patient and informed them that he has a UTI. Discussed the plan for discharge with antibiotics and advised the patient to follow-up with his doctor to have his bilirubin evaluated due to it being high today.     Patient agrees to schedule follow-up appointment with primary care physician and if no improvement in the UA would consider broadening antibiotics   No enlarged or boggy prostate on rectal  exam today    #Infiltrated IV  Given patient's infiltrated IV heat pack applied along with compressive dressing and instructions follow-up with primary care    DISPOSITION AND DISCUSSIONS  I have discussed management of the patient with the following physicians and KIM's:  None      The patient will return for new or worsening symptoms and is stable at the time of discharge.    The patient is referred to a primary physician for blood pressure management, diabetic screening, and for all other preventative health concerns.    DISPOSITION:  Patient will be discharged home in stable condition.    FOLLOW UP:  Charmaine Fink M.D.  [address]  [facility] [id number] [phone]    In 3 days      [facility], Emergency Dept  [address] [facility] [address] [phone]    If symptoms worsen    [facility]  [facility] [facility] [address]  [phone]  In 1 week  call to schedule Gastroenterology follow up    OUTPATIENT MEDICATIONS:  Discharge Medication List as of 8/6/2024  4:54 PM        START taking these medications    Details   sulfamethoxazole-trimethoprim (BACTRIM DS) 800-160 MG tablet Take 1 Tablet by mouth 2 times a day for 7 days., Disp-14 Tablet, R-0, Normal      polyethylene glycol/lytes (MIRALAX) Pack Take 1 Packet by mouth every day for 7 days., Disp-7 Packet, R-0, Normal            FINAL DIAGNOSIS  1. Acute UTI    2. Diverticulosis    3. Generalized abdominal pain    4. Intravenous infiltration, initial encounter       Corine LOPEZ), am scribing for, and in the presence of, Dave Zhou M.D..    Electronically signed by: Corine Suarez (Priscilla), 8/6/2024    IDave M.D. personally performed the services described in this documentation, as scribed by Corine Suarez in my presence, and it is both accurate and complete.      The note accurately reflects work and decisions made by me.  Dave Zhou M.D.  8/6/2024  8:17 PM

## 2024-08-06 NOTE — DISCHARGE INSTRUCTIONS
Please discuss the following findings with your regular doctor:  CT-ABDOMEN-PELVIS WITH   Final Result      1.  No evidence of acute inflammation on this noncontrast CT.   2.  Distal colonic diverticulosis.   3.  Atherosclerotic changes. Infrarenal abdominal aortic ectasia measuring 3.0 cm.      DX-CHEST-PORTABLE (1 VIEW)   Final Result      No acute cardiopulmonary disease evident.        Labs Reviewed   CBC WITH DIFFERENTIAL - Abnormal; Notable for the following components:       Result Value    Neutrophils-Polys 75.90 (*)     Lymphocytes 14.40 (*)     All other components within normal limits   COMP METABOLIC PANEL - Abnormal; Notable for the following components:    Co2 18 (*)     Glucose 136 (*)     Total Bilirubin 1.9 (*)     All other components within normal limits   URINALYSIS - Abnormal; Notable for the following components:    Ketones Trace (*)     Leukocyte Esterase Small (*)     Occult Blood Small (*)     All other components within normal limits   URINE MICROSCOPIC (W/UA) - Abnormal; Notable for the following components:    WBC 20-50 (*)     RBC 2-5 (*)     Hyaline Cast 3-5 (*)     All other components within normal limits   LIPASE   COD (ADULT)   PROTHROMBIN TIME   APTT   ESTIMATED GFR   ABO RH CONFIRM

## 2024-08-07 NOTE — ED NOTES
Reviewed discharge instructions, patient verbalized understanding. States they will schedule follow up appointment if needed. Pt agreeable to  prescriptions from pharmacy and verbalized understanding on how to take medications. Verbalized understanding to not drive or operate heavy machinery while taking narcotics.    Denies further questions at this time. Pt ambulatory out of ER with steady gait where he states he has senior care that supplies him with a cab. Pt offered food and refreshments for the road, Denies want, states doesn't like food options.

## 2025-04-09 ENCOUNTER — APPOINTMENT (OUTPATIENT)
Dept: RADIOLOGY | Facility: MEDICAL CENTER | Age: 75
DRG: 690 | End: 2025-04-09
Attending: EMERGENCY MEDICINE
Payer: MEDICARE

## 2025-04-09 ENCOUNTER — HOSPITAL ENCOUNTER (INPATIENT)
Facility: MEDICAL CENTER | Age: 75
LOS: 3 days | DRG: 690 | End: 2025-04-12
Attending: EMERGENCY MEDICINE | Admitting: STUDENT IN AN ORGANIZED HEALTH CARE EDUCATION/TRAINING PROGRAM
Payer: MEDICARE

## 2025-04-09 DIAGNOSIS — I95.1 ORTHOSTATIC HYPOTENSION: ICD-10-CM

## 2025-04-09 DIAGNOSIS — E55.9 VITAMIN D DEFICIENCY: ICD-10-CM

## 2025-04-09 DIAGNOSIS — G89.29 CHRONIC MIDLINE LOW BACK PAIN, UNSPECIFIED WHETHER SCIATICA PRESENT: ICD-10-CM

## 2025-04-09 DIAGNOSIS — R53.1 GENERALIZED WEAKNESS: ICD-10-CM

## 2025-04-09 DIAGNOSIS — R53.81 PHYSICAL DECONDITIONING: ICD-10-CM

## 2025-04-09 DIAGNOSIS — K21.9 GASTROESOPHAGEAL REFLUX DISEASE, UNSPECIFIED WHETHER ESOPHAGITIS PRESENT: ICD-10-CM

## 2025-04-09 DIAGNOSIS — N39.0 ACUTE UTI: ICD-10-CM

## 2025-04-09 DIAGNOSIS — R10.30 LOWER ABDOMINAL PAIN: ICD-10-CM

## 2025-04-09 DIAGNOSIS — M54.50 CHRONIC MIDLINE LOW BACK PAIN, UNSPECIFIED WHETHER SCIATICA PRESENT: ICD-10-CM

## 2025-04-09 DIAGNOSIS — K59.00 CONSTIPATION, UNSPECIFIED CONSTIPATION TYPE: ICD-10-CM

## 2025-04-09 DIAGNOSIS — N17.9 AKI (ACUTE KIDNEY INJURY) (HCC): ICD-10-CM

## 2025-04-09 PROBLEM — I77.811 AORTIC ECTASIA, ABDOMINAL (HCC): Status: ACTIVE | Noted: 2025-04-09

## 2025-04-09 PROBLEM — N20.0 NEPHROLITHIASIS: Status: ACTIVE | Noted: 2025-04-09

## 2025-04-09 PROBLEM — M25.50 JOINT PAIN: Status: ACTIVE | Noted: 2025-04-09

## 2025-04-09 PROBLEM — E78.5 HYPERLIPIDEMIA: Status: ACTIVE | Noted: 2025-04-09

## 2025-04-09 PROBLEM — K76.89 HEPATIC CYST: Status: ACTIVE | Noted: 2025-04-09

## 2025-04-09 PROBLEM — N28.1 RENAL CYST: Status: ACTIVE | Noted: 2025-04-09

## 2025-04-09 PROBLEM — N40.0 BPH (BENIGN PROSTATIC HYPERPLASIA): Status: ACTIVE | Noted: 2025-04-09

## 2025-04-09 LAB
ALBUMIN SERPL BCP-MCNC: 4 G/DL (ref 3.2–4.9)
ALBUMIN/GLOB SERPL: 1.2 G/DL
ALP SERPL-CCNC: 91 U/L (ref 30–99)
ALT SERPL-CCNC: 30 U/L (ref 2–50)
ANION GAP SERPL CALC-SCNC: 13 MMOL/L (ref 7–16)
APPEARANCE UR: ABNORMAL
AST SERPL-CCNC: 31 U/L (ref 12–45)
BACTERIA #/AREA URNS HPF: ABNORMAL /HPF
BASOPHILS # BLD AUTO: 0.7 % (ref 0–1.8)
BASOPHILS # BLD: 0.06 K/UL (ref 0–0.12)
BILIRUB SERPL-MCNC: 1.6 MG/DL (ref 0.1–1.5)
BILIRUB UR QL STRIP.AUTO: ABNORMAL
BUN SERPL-MCNC: 25 MG/DL (ref 8–22)
CA OXALATE CRYSTAL  1765: PRESENT /HPF
CALCIUM ALBUM COR SERPL-MCNC: 10 MG/DL (ref 8.5–10.5)
CALCIUM SERPL-MCNC: 10 MG/DL (ref 8.5–10.5)
CASTS URNS QL MICRO: ABNORMAL /LPF (ref 0–2)
CHLORIDE SERPL-SCNC: 106 MMOL/L (ref 96–112)
CO2 SERPL-SCNC: 23 MMOL/L (ref 20–33)
COLOR UR: ABNORMAL
CREAT SERPL-MCNC: 1.7 MG/DL (ref 0.5–1.4)
EOSINOPHIL # BLD AUTO: 0.15 K/UL (ref 0–0.51)
EOSINOPHIL NFR BLD: 1.8 % (ref 0–6.9)
EPITHELIAL CELLS 1715: ABNORMAL /HPF (ref 0–5)
ERYTHROCYTE [DISTWIDTH] IN BLOOD BY AUTOMATED COUNT: 44.4 FL (ref 35.9–50)
GFR SERPLBLD CREATININE-BSD FMLA CKD-EPI: 41 ML/MIN/1.73 M 2
GLOBULIN SER CALC-MCNC: 3.3 G/DL (ref 1.9–3.5)
GLUCOSE SERPL-MCNC: 132 MG/DL (ref 65–99)
GLUCOSE UR STRIP.AUTO-MCNC: NEGATIVE MG/DL
HCT VFR BLD AUTO: 45.9 % (ref 42–52)
HGB BLD-MCNC: 15.8 G/DL (ref 14–18)
HYALINE CAST   1831: PRESENT /LPF
IMM GRANULOCYTES # BLD AUTO: 0.03 K/UL (ref 0–0.11)
IMM GRANULOCYTES NFR BLD AUTO: 0.4 % (ref 0–0.9)
KETONES UR STRIP.AUTO-MCNC: 15 MG/DL
LEUKOCYTE ESTERASE UR QL STRIP.AUTO: ABNORMAL
LIPASE SERPL-CCNC: 25 U/L (ref 11–82)
LYMPHOCYTES # BLD AUTO: 1.18 K/UL (ref 1–4.8)
LYMPHOCYTES NFR BLD: 14.3 % (ref 22–41)
MCH RBC QN AUTO: 32.3 PG (ref 27–33)
MCHC RBC AUTO-ENTMCNC: 34.4 G/DL (ref 32.3–36.5)
MCV RBC AUTO: 93.9 FL (ref 81.4–97.8)
MICRO URNS: ABNORMAL
MONOCYTES # BLD AUTO: 0.62 K/UL (ref 0–0.85)
MONOCYTES NFR BLD AUTO: 7.5 % (ref 0–13.4)
MUCOUS THREADS URNS QL MICRO: PRESENT /HPF
NEUTROPHILS # BLD AUTO: 6.2 K/UL (ref 1.82–7.42)
NEUTROPHILS NFR BLD: 75.3 % (ref 44–72)
NITRITE UR QL STRIP.AUTO: POSITIVE
NRBC # BLD AUTO: 0 K/UL
NRBC BLD-RTO: 0 /100 WBC (ref 0–0.2)
PH UR STRIP.AUTO: 5 [PH] (ref 5–8)
PLATELET # BLD AUTO: 190 K/UL (ref 164–446)
PMV BLD AUTO: 10.7 FL (ref 9–12.9)
POTASSIUM SERPL-SCNC: 4 MMOL/L (ref 3.6–5.5)
PROT SERPL-MCNC: 7.3 G/DL (ref 6–8.2)
PROT UR QL STRIP: 100 MG/DL
RBC # BLD AUTO: 4.89 M/UL (ref 4.7–6.1)
RBC # URNS HPF: ABNORMAL /HPF (ref 0–2)
RBC UR QL AUTO: NEGATIVE
SODIUM SERPL-SCNC: 142 MMOL/L (ref 135–145)
SP GR UR STRIP.AUTO: 1.03
UROBILINOGEN UR STRIP.AUTO-MCNC: 1 EU/DL
WBC # BLD AUTO: 8.2 K/UL (ref 4.8–10.8)
WBC #/AREA URNS HPF: ABNORMAL /HPF

## 2025-04-09 PROCEDURE — 700111 HCHG RX REV CODE 636 W/ 250 OVERRIDE (IP): Mod: JZ,UD | Performed by: EMERGENCY MEDICINE

## 2025-04-09 PROCEDURE — 700102 HCHG RX REV CODE 250 W/ 637 OVERRIDE(OP): Mod: UD | Performed by: EMERGENCY MEDICINE

## 2025-04-09 PROCEDURE — 99223 1ST HOSP IP/OBS HIGH 75: CPT | Mod: AI,GC | Performed by: STUDENT IN AN ORGANIZED HEALTH CARE EDUCATION/TRAINING PROGRAM

## 2025-04-09 PROCEDURE — 96374 THER/PROPH/DIAG INJ IV PUSH: CPT

## 2025-04-09 PROCEDURE — 74176 CT ABD & PELVIS W/O CONTRAST: CPT

## 2025-04-09 PROCEDURE — 85025 COMPLETE CBC W/AUTO DIFF WBC: CPT

## 2025-04-09 PROCEDURE — 36415 COLL VENOUS BLD VENIPUNCTURE: CPT

## 2025-04-09 PROCEDURE — 700105 HCHG RX REV CODE 258: Mod: UD | Performed by: EMERGENCY MEDICINE

## 2025-04-09 PROCEDURE — 700105 HCHG RX REV CODE 258

## 2025-04-09 PROCEDURE — A9270 NON-COVERED ITEM OR SERVICE: HCPCS

## 2025-04-09 PROCEDURE — 80053 COMPREHEN METABOLIC PANEL: CPT

## 2025-04-09 PROCEDURE — 700102 HCHG RX REV CODE 250 W/ 637 OVERRIDE(OP)

## 2025-04-09 PROCEDURE — 81001 URINALYSIS AUTO W/SCOPE: CPT

## 2025-04-09 PROCEDURE — A9270 NON-COVERED ITEM OR SERVICE: HCPCS | Mod: UD | Performed by: EMERGENCY MEDICINE

## 2025-04-09 PROCEDURE — 99285 EMERGENCY DEPT VISIT HI MDM: CPT

## 2025-04-09 PROCEDURE — 770001 HCHG ROOM/CARE - MED/SURG/GYN PRIV*

## 2025-04-09 PROCEDURE — 83690 ASSAY OF LIPASE: CPT

## 2025-04-09 RX ORDER — SODIUM CHLORIDE 9 MG/ML
1000 INJECTION, SOLUTION INTRAVENOUS ONCE
Status: COMPLETED | OUTPATIENT
Start: 2025-04-09 | End: 2025-04-09

## 2025-04-09 RX ORDER — LISINOPRIL 20 MG/1
40 TABLET ORAL DAILY
Status: DISCONTINUED | OUTPATIENT
Start: 2025-04-10 | End: 2025-04-12 | Stop reason: HOSPADM

## 2025-04-09 RX ORDER — POLYETHYLENE GLYCOL 3350 17 G/17G
1 POWDER, FOR SOLUTION ORAL
Status: DISCONTINUED | OUTPATIENT
Start: 2025-04-09 | End: 2025-04-12 | Stop reason: HOSPADM

## 2025-04-09 RX ORDER — OMEPRAZOLE 20 MG/1
20 CAPSULE, DELAYED RELEASE ORAL DAILY
Status: DISCONTINUED | OUTPATIENT
Start: 2025-04-09 | End: 2025-04-12 | Stop reason: HOSPADM

## 2025-04-09 RX ORDER — OXYCODONE HYDROCHLORIDE 5 MG/1
2.5 TABLET ORAL
Refills: 0 | Status: DISCONTINUED | OUTPATIENT
Start: 2025-04-09 | End: 2025-04-12 | Stop reason: HOSPADM

## 2025-04-09 RX ORDER — ACETAMINOPHEN 500 MG
1000 TABLET ORAL ONCE
Status: COMPLETED | OUTPATIENT
Start: 2025-04-09 | End: 2025-04-09

## 2025-04-09 RX ORDER — AMOXICILLIN 250 MG
2 CAPSULE ORAL EVERY EVENING
Status: DISCONTINUED | OUTPATIENT
Start: 2025-04-10 | End: 2025-04-12 | Stop reason: HOSPADM

## 2025-04-09 RX ORDER — ACETAMINOPHEN 500 MG
1000 TABLET ORAL ONCE
Status: DISCONTINUED | OUTPATIENT
Start: 2025-04-09 | End: 2025-04-09

## 2025-04-09 RX ORDER — TAMSULOSIN HYDROCHLORIDE 0.4 MG/1
0.4 CAPSULE ORAL
Status: DISCONTINUED | OUTPATIENT
Start: 2025-04-10 | End: 2025-04-12 | Stop reason: HOSPADM

## 2025-04-09 RX ORDER — ACETAMINOPHEN 500 MG
1000 TABLET ORAL EVERY 6 HOURS
Status: DISCONTINUED | OUTPATIENT
Start: 2025-04-09 | End: 2025-04-12 | Stop reason: HOSPADM

## 2025-04-09 RX ORDER — CEFTRIAXONE 2 G/1
2000 INJECTION, POWDER, FOR SOLUTION INTRAMUSCULAR; INTRAVENOUS ONCE
Status: COMPLETED | OUTPATIENT
Start: 2025-04-09 | End: 2025-04-09

## 2025-04-09 RX ORDER — SODIUM CHLORIDE, SODIUM LACTATE, POTASSIUM CHLORIDE, CALCIUM CHLORIDE 600; 310; 30; 20 MG/100ML; MG/100ML; MG/100ML; MG/100ML
INJECTION, SOLUTION INTRAVENOUS CONTINUOUS
Status: DISCONTINUED | OUTPATIENT
Start: 2025-04-09 | End: 2025-04-09

## 2025-04-09 RX ORDER — ACETAMINOPHEN 500 MG
1000 TABLET ORAL EVERY 6 HOURS PRN
Status: DISCONTINUED | OUTPATIENT
Start: 2025-04-15 | End: 2025-04-12 | Stop reason: HOSPADM

## 2025-04-09 RX ORDER — AMLODIPINE BESYLATE 2.5 MG/1
2.5 TABLET ORAL DAILY
Status: DISCONTINUED | OUTPATIENT
Start: 2025-04-10 | End: 2025-04-12 | Stop reason: HOSPADM

## 2025-04-09 RX ORDER — HEPARIN SODIUM 5000 [USP'U]/ML
5000 INJECTION, SOLUTION INTRAVENOUS; SUBCUTANEOUS EVERY 8 HOURS
Status: DISCONTINUED | OUTPATIENT
Start: 2025-04-10 | End: 2025-04-12 | Stop reason: HOSPADM

## 2025-04-09 RX ORDER — OXYCODONE HYDROCHLORIDE 5 MG/1
5 TABLET ORAL
Refills: 0 | Status: DISCONTINUED | OUTPATIENT
Start: 2025-04-09 | End: 2025-04-12 | Stop reason: HOSPADM

## 2025-04-09 RX ORDER — GABAPENTIN 300 MG/1
300 CAPSULE ORAL
Status: DISCONTINUED | OUTPATIENT
Start: 2025-04-09 | End: 2025-04-12 | Stop reason: HOSPADM

## 2025-04-09 RX ORDER — SODIUM CHLORIDE 9 MG/ML
INJECTION, SOLUTION INTRAVENOUS CONTINUOUS
Status: DISCONTINUED | OUTPATIENT
Start: 2025-04-09 | End: 2025-04-10

## 2025-04-09 RX ORDER — ATORVASTATIN CALCIUM 20 MG/1
20 TABLET, FILM COATED ORAL DAILY
Status: DISCONTINUED | OUTPATIENT
Start: 2025-04-10 | End: 2025-04-12 | Stop reason: HOSPADM

## 2025-04-09 RX ORDER — HYDROMORPHONE HYDROCHLORIDE 1 MG/ML
0.25 INJECTION, SOLUTION INTRAMUSCULAR; INTRAVENOUS; SUBCUTANEOUS
Status: DISCONTINUED | OUTPATIENT
Start: 2025-04-09 | End: 2025-04-12 | Stop reason: HOSPADM

## 2025-04-09 RX ADMIN — SODIUM CHLORIDE 1000 ML: 9 INJECTION, SOLUTION INTRAVENOUS at 16:40

## 2025-04-09 RX ADMIN — ACETAMINOPHEN 1000 MG: 500 TABLET ORAL at 16:40

## 2025-04-09 RX ADMIN — SODIUM CHLORIDE: 9 INJECTION, SOLUTION INTRAVENOUS at 20:22

## 2025-04-09 RX ADMIN — OMEPRAZOLE 20 MG: 20 CAPSULE, DELAYED RELEASE ORAL at 20:23

## 2025-04-09 RX ADMIN — OXYCODONE HYDROCHLORIDE 5 MG: 5 TABLET ORAL at 20:22

## 2025-04-09 RX ADMIN — LIDOCAINE HYDROCHLORIDE 15 ML: 20 SOLUTION OROPHARYNGEAL at 20:23

## 2025-04-09 RX ADMIN — CEFTRIAXONE SODIUM 2000 MG: 2 INJECTION, POWDER, FOR SOLUTION INTRAMUSCULAR; INTRAVENOUS at 18:42

## 2025-04-09 SDOH — ECONOMIC STABILITY: TRANSPORTATION INSECURITY
IN THE PAST 12 MONTHS, HAS LACK OF RELIABLE TRANSPORTATION KEPT YOU FROM MEDICAL APPOINTMENTS, MEETINGS, WORK OR FROM GETTING THINGS NEEDED FOR DAILY LIVING?: NO

## 2025-04-09 SDOH — ECONOMIC STABILITY: TRANSPORTATION INSECURITY
IN THE PAST 12 MONTHS, HAS THE LACK OF TRANSPORTATION KEPT YOU FROM MEDICAL APPOINTMENTS OR FROM GETTING MEDICATIONS?: NO

## 2025-04-09 ASSESSMENT — LIFESTYLE VARIABLES
TOTAL SCORE: 0
ON A TYPICAL DAY WHEN YOU DRINK ALCOHOL HOW MANY DRINKS DO YOU HAVE: 0
HOW MANY TIMES IN THE PAST YEAR HAVE YOU HAD 5 OR MORE DRINKS IN A DAY: 0
TOTAL SCORE: 0
EVER FELT BAD OR GUILTY ABOUT YOUR DRINKING: NO
ALCOHOL_USE: NO
HAVE YOU EVER FELT YOU SHOULD CUT DOWN ON YOUR DRINKING: NO
AVERAGE NUMBER OF DAYS PER WEEK YOU HAVE A DRINK CONTAINING ALCOHOL: 0
CONSUMPTION TOTAL: NEGATIVE
DOES PATIENT WANT TO STOP DRINKING: NO
HAVE PEOPLE ANNOYED YOU BY CRITICIZING YOUR DRINKING: NO
TOTAL SCORE: 0
EVER HAD A DRINK FIRST THING IN THE MORNING TO STEADY YOUR NERVES TO GET RID OF A HANGOVER: NO

## 2025-04-09 ASSESSMENT — SOCIAL DETERMINANTS OF HEALTH (SDOH)

## 2025-04-09 ASSESSMENT — ENCOUNTER SYMPTOMS
LOSS OF CONSCIOUSNESS: 0
MYALGIAS: 0
CONSTIPATION: 1
FALLS: 0
DIARRHEA: 1
COUGH: 1
FOCAL WEAKNESS: 0
FEVER: 0
HEADACHES: 0
FLANK PAIN: 1
SEIZURES: 0
NAUSEA: 0
VOMITING: 0
BLURRED VISION: 0
SHORTNESS OF BREATH: 0
DIZZINESS: 0
PALPITATIONS: 0
SPUTUM PRODUCTION: 0
CHILLS: 0
SORE THROAT: 0
ABDOMINAL PAIN: 1
DOUBLE VISION: 0

## 2025-04-09 ASSESSMENT — COGNITIVE AND FUNCTIONAL STATUS - GENERAL
SUGGESTED CMS G CODE MODIFIER DAILY ACTIVITY: CH
MOBILITY SCORE: 24
DAILY ACTIVITIY SCORE: 24
SUGGESTED CMS G CODE MODIFIER MOBILITY: CH

## 2025-04-09 ASSESSMENT — PATIENT HEALTH QUESTIONNAIRE - PHQ9
SUM OF ALL RESPONSES TO PHQ9 QUESTIONS 1 AND 2: 0
1. LITTLE INTEREST OR PLEASURE IN DOING THINGS: NOT AT ALL
2. FEELING DOWN, DEPRESSED, IRRITABLE, OR HOPELESS: NOT AT ALL

## 2025-04-09 ASSESSMENT — PAIN DESCRIPTION - PAIN TYPE
TYPE: ACUTE PAIN
TYPE: ACUTE PAIN;CHRONIC PAIN

## 2025-04-09 ASSESSMENT — FIBROSIS 4 INDEX: FIB4 SCORE: 2.04

## 2025-04-09 NOTE — ED TRIAGE NOTES
Chief Complaint   Patient presents with    Abdominal Pain     Lower abdominal pain x2 weeks, worsening today. 10/10 sharp stabbing pain. -n/v/d. Last BM this morning.     Painful Urination     X1 week, unsure if there is an odor. +dark urine      Patient ambulatory to triage for above complaint. Patient A&Ox4, GCS 15, patient speaking in full sentences. Equal and unlabored respirations. Patient educated on triage process and encouraged to notify staff if condition worsens. Appropriate protocols ordered. Patient returned to the lobby in stable condition.

## 2025-04-09 NOTE — ED PROVIDER NOTES
ER Provider Note    Scribed for Mark Munoz M.D. by Christopher Greenberg. 4/9/2025   4:25 PM    Primary Care Provider: ANTHONY MCCLURE M.D.    CHIEF COMPLAINT  Chief Complaint   Patient presents with    Abdominal Pain     Lower abdominal pain x2 weeks, worsening today. 10/10 sharp stabbing pain. -n/v/d. Last BM this morning.     Painful Urination     X1 week, unsure if there is an odor. +dark urine     EXTERNAL RECORDS REVIEWED  External ED Note The patient was last seen in the ED 8/6/24 for the evaluation of intermittent lower abdominal pain.    HPI/ROS  LIMITATION TO HISTORY   Select: : None  OUTSIDE HISTORIAN(S):  None    Fausto Martin is a 75 y.o. male who presents to the ED for evaluation of lower abdominal pain onset two weeks ago. The patient reports his pain has been waxing and waning for a few months and states it has gotten worse the past few days. He describes his pain is sharp. Patient was here about eight months ago and had a bladder infection at that time, and he says his current pain is different that what he experienced at that time. He notes to having dysuria and states he has associating abdominal pain upon urination. He denies any penal pain with urination. He denies fever, nausea, or vomiting. He has normal PO intake. No alleviating factors attempted. He comments his last bowel movement was this morning and described it as diarrhea. He takes a laxative every 3-4 days. Patient notes that he is chronically constipated.     PAST MEDICAL HISTORY  Past Medical History:   Diagnosis Date    High cholesterol     Hypertension     Neuropathy        SURGICAL HISTORY  History reviewed. No pertinent surgical history.    FAMILY HISTORY  History reviewed. No pertinent family history.    SOCIAL HISTORY   reports that he has never smoked. He has never used smokeless tobacco. He reports that he does not currently use alcohol. He reports current drug use. Drug: Inhaled.    CURRENT MEDICATIONS  Current Discharge  "Medication List        CONTINUE these medications which have NOT CHANGED    Details   tamsulosin (FLOMAX) 0.4 MG capsule Take 1 Capsule by mouth 1/2 hour after breakfast.  Qty: 30 Capsule, Refills: 5    Associated Diagnoses: Benign prostatic hyperplasia with urinary retention      gabapentin (NEURONTIN) 100 MG Cap Take 300-500 mg by mouth 1 time a day as needed (pain). 3 capsules =300 mg  5 capsules = 500 mg      amLODIPine (NORVASC) 2.5 MG Tab Take 2.5 mg by mouth every day.      atorvastatin (LIPITOR) 20 MG Tab Take 20 mg by mouth every day.      linaCLOtide (LINZESS) 145 MCG Cap Take 290 mg by mouth see administration instructions. Every 3 to 4 days PRN  290 mcg = 2 caps      lisinopril (PRINIVIL) 40 MG tablet Take 40 mg by mouth every day.             ALLERGIES  No Known Allergies     PHYSICAL EXAM  /66   Pulse 61   Temp 36.6 °C (97.9 °F) (Temporal)   Resp 18   Ht 1.854 m (6' 1\")   Wt 97.5 kg (214 lb 15.2 oz)   SpO2 99%   BMI 28.36 kg/m²    Constitutional: Well developed, Well nourished, Mild distress,    HENT: Normocephalic, Atraumatic   Eyes: PERRLA, EOMI, Conjunctiva normal, No discharge.   Neck: No tenderness, Supple, No stridor.   Cardiovascular: Normal heart rate, Normal rhythm.   Thorax & Lungs: Clear to auscultation bilaterally, No respiratory distress.   Abdomen: Soft.  No masses. Mild suprapubic tenderness   Skin: Warm, Dry, No rash.    Musculoskeletal: No major deformities noted.  Neurologic: Awake, alert. Moves all extremities spontaneously.  Psychiatric: Affect normal, Judgment normal, Mood normal.       DIAGNOSTIC STUDIES    Labs:   Results for orders placed or performed during the hospital encounter of 04/09/25   CBC WITH DIFFERENTIAL    Collection Time: 04/09/25  3:27 PM   Result Value Ref Range    WBC 8.2 4.8 - 10.8 K/uL    RBC 4.89 4.70 - 6.10 M/uL    Hemoglobin 15.8 14.0 - 18.0 g/dL    Hematocrit 45.9 42.0 - 52.0 %    MCV 93.9 81.4 - 97.8 fL    MCH 32.3 27.0 - 33.0 pg    MCHC 34.4 " 32.3 - 36.5 g/dL    RDW 44.4 35.9 - 50.0 fL    Platelet Count 190 164 - 446 K/uL    MPV 10.7 9.0 - 12.9 fL    Neutrophils-Polys 75.30 (H) 44.00 - 72.00 %    Lymphocytes 14.30 (L) 22.00 - 41.00 %    Monocytes 7.50 0.00 - 13.40 %    Eosinophils 1.80 0.00 - 6.90 %    Basophils 0.70 0.00 - 1.80 %    Immature Granulocytes 0.40 0.00 - 0.90 %    Nucleated RBC 0.00 0.00 - 0.20 /100 WBC    Neutrophils (Absolute) 6.20 1.82 - 7.42 K/uL    Lymphs (Absolute) 1.18 1.00 - 4.80 K/uL    Monos (Absolute) 0.62 0.00 - 0.85 K/uL    Eos (Absolute) 0.15 0.00 - 0.51 K/uL    Baso (Absolute) 0.06 0.00 - 0.12 K/uL    Immature Granulocytes (abs) 0.03 0.00 - 0.11 K/uL    NRBC (Absolute) 0.00 K/uL   COMP METABOLIC PANEL    Collection Time: 04/09/25  3:27 PM   Result Value Ref Range    Sodium 142 135 - 145 mmol/L    Potassium 4.0 3.6 - 5.5 mmol/L    Chloride 106 96 - 112 mmol/L    Co2 23 20 - 33 mmol/L    Anion Gap 13.0 7.0 - 16.0    Glucose 132 (H) 65 - 99 mg/dL    Bun 25 (H) 8 - 22 mg/dL    Creatinine 1.70 (H) 0.50 - 1.40 mg/dL    Calcium 10.0 8.5 - 10.5 mg/dL    Correct Calcium 10.0 8.5 - 10.5 mg/dL    AST(SGOT) 31 12 - 45 U/L    ALT(SGPT) 30 2 - 50 U/L    Alkaline Phosphatase 91 30 - 99 U/L    Total Bilirubin 1.6 (H) 0.1 - 1.5 mg/dL    Albumin 4.0 3.2 - 4.9 g/dL    Total Protein 7.3 6.0 - 8.2 g/dL    Globulin 3.3 1.9 - 3.5 g/dL    A-G Ratio 1.2 g/dL   LIPASE    Collection Time: 04/09/25  3:27 PM   Result Value Ref Range    Lipase 25 11 - 82 U/L   ESTIMATED GFR    Collection Time: 04/09/25  3:27 PM   Result Value Ref Range    GFR (CKD-EPI) 41 (A) >60 mL/min/1.73 m 2   URINALYSIS    Collection Time: 04/09/25  4:43 PM    Specimen: Urine   Result Value Ref Range    Color Dark Yellow     Character Hazy (A)     Specific Gravity 1.028 <1.035    Ph 5.0 5.0 - 8.0    Glucose Negative Negative mg/dL    Ketones 15 (A) Negative mg/dL    Protein 100 (A) Negative mg/dL    Bilirubin Moderate (A) Negative    Urobilinogen, Urine 1.0 <=1.0 EU/dL    Nitrite  Positive (A) Negative    Leukocyte Esterase Moderate (A) Negative    Occult Blood Negative Negative    Micro Urine Req Microscopic    URINE MICROSCOPIC (W/UA)    Collection Time: 04/09/25  4:43 PM   Result Value Ref Range    WBC 6-10 (A) /hpf    RBC 0-2 0 - 2 /hpf    Bacteria Few (A) None /hpf    Epithelial Cells 6-10 (A) 0 - 5 /hpf    Mucous Threads Present /hpf    Ca Oxalate Crystal Present (A) Absent /hpf    Urine Casts 3-5 (A) 0 - 2 /lpf    Hyaline Cast Present /lpf       Radiology:   This attending emergency physician has independently interpreted the diagnostic imaging associated with this visit and is awaiting the final reading from the radiologist.   Preliminary interpretation is a follows: No hydronephrosis  Radiologist interpretation:   CT-ABDOMEN-PELVIS W/O   Final Result         1. Bilateral nephrolithiasis.   2. Hepatic and renal cysts.   3. Atherosclerosis with ectasia of the abdominal aorta.   4. Mild diverticulosis.      US-RENAL    (Results Pending)          COURSE & MEDICAL DECISION MAKING       INITIAL ASSESSMENT, COURSE AND PLAN  Differential diagnoses include but not limited to: Diverticulitis, Constipation, UTI    Care Narrative: Patient coming in with lower abdominal pain also dysuria, workup here shows the patient has a urinary tract infection, CT scan did not show any hydronephrosis.  Give the patient IV antibiotics.  The patient also has some acute kidney injury and will need to stay in the hospital, discussed case with the hospitalist for hospitalization.    4:25 PM - Patient was evaluated at bedside. The patient presents to the ED for evaluation of lower abdominal pain onset two weeks ago. Ordered for UA, Lipase, CMP, CBC w/diff, and Urine microscopic  to evaluate. The patient will be medicated with Tylenol 1,000 mg and NS 1,000 mL for his symptoms. Patient verbalizes understanding and support with my plan of care.     6:50 PM - I discussed the patient's case and the above findings with  Dr. Jennings (hospitalist) who agrees to admit the patient.     DISPOSITION AND DISCUSSIONS    I have discussed management of the patient with the following physicians and KIM's:  Dr. Jennings    Discussion of management with other [facility] or appropriate source(s): None       DISPOSITION:  Patient will be hospitalized by Dr. Jennings in guarded condition.      FINAL DIAGNOSIS  1. Acute UTI    2. ELIZABETH (acute kidney injury) ([facility])         Christopher LOPEZ (Scribe), am scribing for, and in the presence of, Mark Munoz M.D..    Electronically signed by: Christopher Greenberg (Scribe), 4/9/2025    IMark M.D. personally performed the services described in this documentation, as scribed by Christopher Greenberg in my presence, and it is both accurate and complete.      The note accurately reflects work and decisions made by me.  Mark Munoz M.D.  4/9/2025  8:17 PM

## 2025-04-10 PROBLEM — R53.81 PHYSICAL DECONDITIONING: Status: ACTIVE | Noted: 2025-04-10

## 2025-04-10 LAB
ALBUMIN SERPL BCP-MCNC: 3.4 G/DL (ref 3.2–4.9)
ALBUMIN/GLOB SERPL: 1.4 G/DL
ALP SERPL-CCNC: 69 U/L (ref 30–99)
ALT SERPL-CCNC: 25 U/L (ref 2–50)
ANION GAP SERPL CALC-SCNC: 9 MMOL/L (ref 7–16)
AST SERPL-CCNC: 24 U/L (ref 12–45)
BASOPHILS # BLD AUTO: 0.6 % (ref 0–1.8)
BASOPHILS # BLD: 0.04 K/UL (ref 0–0.12)
BILIRUB SERPL-MCNC: 1.1 MG/DL (ref 0.1–1.5)
BUN SERPL-MCNC: 27 MG/DL (ref 8–22)
CALCIUM ALBUM COR SERPL-MCNC: 9 MG/DL (ref 8.5–10.5)
CALCIUM SERPL-MCNC: 8.5 MG/DL (ref 8.5–10.5)
CHLORIDE SERPL-SCNC: 109 MMOL/L (ref 96–112)
CO2 SERPL-SCNC: 23 MMOL/L (ref 20–33)
CREAT SERPL-MCNC: 1.51 MG/DL (ref 0.5–1.4)
EOSINOPHIL # BLD AUTO: 0.25 K/UL (ref 0–0.51)
EOSINOPHIL NFR BLD: 3.5 % (ref 0–6.9)
ERYTHROCYTE [DISTWIDTH] IN BLOOD BY AUTOMATED COUNT: 43.8 FL (ref 35.9–50)
GFR SERPLBLD CREATININE-BSD FMLA CKD-EPI: 48 ML/MIN/1.73 M 2
GLOBULIN SER CALC-MCNC: 2.4 G/DL (ref 1.9–3.5)
GLUCOSE SERPL-MCNC: 98 MG/DL (ref 65–99)
HCT VFR BLD AUTO: 38.3 % (ref 42–52)
HGB BLD-MCNC: 13.2 G/DL (ref 14–18)
IMM GRANULOCYTES # BLD AUTO: 0.03 K/UL (ref 0–0.11)
IMM GRANULOCYTES NFR BLD AUTO: 0.4 % (ref 0–0.9)
LYMPHOCYTES # BLD AUTO: 1.17 K/UL (ref 1–4.8)
LYMPHOCYTES NFR BLD: 16.6 % (ref 22–41)
MCH RBC QN AUTO: 32 PG (ref 27–33)
MCHC RBC AUTO-ENTMCNC: 34.5 G/DL (ref 32.3–36.5)
MCV RBC AUTO: 93 FL (ref 81.4–97.8)
MONOCYTES # BLD AUTO: 0.77 K/UL (ref 0–0.85)
MONOCYTES NFR BLD AUTO: 10.9 % (ref 0–13.4)
NEUTROPHILS # BLD AUTO: 4.79 K/UL (ref 1.82–7.42)
NEUTROPHILS NFR BLD: 68 % (ref 44–72)
NRBC # BLD AUTO: 0 K/UL
NRBC BLD-RTO: 0 /100 WBC (ref 0–0.2)
PLATELET # BLD AUTO: 145 K/UL (ref 164–446)
PMV BLD AUTO: 10.8 FL (ref 9–12.9)
POTASSIUM SERPL-SCNC: 4.7 MMOL/L (ref 3.6–5.5)
PROT SERPL-MCNC: 5.8 G/DL (ref 6–8.2)
RBC # BLD AUTO: 4.12 M/UL (ref 4.7–6.1)
SODIUM SERPL-SCNC: 141 MMOL/L (ref 135–145)
WBC # BLD AUTO: 7.1 K/UL (ref 4.8–10.8)

## 2025-04-10 PROCEDURE — 700105 HCHG RX REV CODE 258

## 2025-04-10 PROCEDURE — 87086 URINE CULTURE/COLONY COUNT: CPT

## 2025-04-10 PROCEDURE — 700102 HCHG RX REV CODE 250 W/ 637 OVERRIDE(OP)

## 2025-04-10 PROCEDURE — 80053 COMPREHEN METABOLIC PANEL: CPT

## 2025-04-10 PROCEDURE — 770001 HCHG ROOM/CARE - MED/SURG/GYN PRIV*

## 2025-04-10 PROCEDURE — 700111 HCHG RX REV CODE 636 W/ 250 OVERRIDE (IP): Mod: JZ

## 2025-04-10 PROCEDURE — 36415 COLL VENOUS BLD VENIPUNCTURE: CPT

## 2025-04-10 PROCEDURE — 87040 BLOOD CULTURE FOR BACTERIA: CPT

## 2025-04-10 PROCEDURE — 700111 HCHG RX REV CODE 636 W/ 250 OVERRIDE (IP)

## 2025-04-10 PROCEDURE — 99233 SBSQ HOSP IP/OBS HIGH 50: CPT | Mod: GC | Performed by: INTERNAL MEDICINE

## 2025-04-10 PROCEDURE — 85025 COMPLETE CBC W/AUTO DIFF WBC: CPT

## 2025-04-10 PROCEDURE — A9270 NON-COVERED ITEM OR SERVICE: HCPCS

## 2025-04-10 RX ORDER — OXYCODONE HYDROCHLORIDE 5 MG/1
2.5 TABLET ORAL EVERY 6 HOURS PRN
Qty: 10 TABLET | Refills: 0 | Status: CANCELLED | OUTPATIENT
Start: 2025-04-10 | End: 2025-04-15

## 2025-04-10 RX ORDER — ONDANSETRON 4 MG/1
4 TABLET, ORALLY DISINTEGRATING ORAL EVERY 4 HOURS PRN
Qty: 10 TABLET | Refills: 0 | Status: CANCELLED | OUTPATIENT
Start: 2025-04-10

## 2025-04-10 RX ORDER — ONDANSETRON 4 MG/1
4 TABLET, ORALLY DISINTEGRATING ORAL EVERY 4 HOURS PRN
Status: DISCONTINUED | OUTPATIENT
Start: 2025-04-10 | End: 2025-04-12 | Stop reason: HOSPADM

## 2025-04-10 RX ADMIN — OXYCODONE HYDROCHLORIDE 5 MG: 5 TABLET ORAL at 12:39

## 2025-04-10 RX ADMIN — OXYCODONE HYDROCHLORIDE 5 MG: 5 TABLET ORAL at 04:26

## 2025-04-10 RX ADMIN — ACETAMINOPHEN 1000 MG: 500 TABLET, FILM COATED ORAL at 17:07

## 2025-04-10 RX ADMIN — SODIUM CHLORIDE: 9 INJECTION, SOLUTION INTRAVENOUS at 04:28

## 2025-04-10 RX ADMIN — HEPARIN SODIUM 5000 UNITS: 5000 INJECTION, SOLUTION INTRAVENOUS; SUBCUTANEOUS at 06:03

## 2025-04-10 RX ADMIN — TAMSULOSIN HYDROCHLORIDE 0.4 MG: 0.4 CAPSULE ORAL at 08:59

## 2025-04-10 RX ADMIN — AMOXICILLIN AND CLAVULANATE POTASSIUM 1 TABLET: 875; 125 TABLET, FILM COATED ORAL at 09:56

## 2025-04-10 RX ADMIN — ONDANSETRON 4 MG: 4 TABLET, ORALLY DISINTEGRATING ORAL at 09:13

## 2025-04-10 RX ADMIN — AMPICILLIN SODIUM, SULBACTAM SODIUM 3 G: 2; 1 INJECTION, POWDER, FOR SOLUTION INTRAMUSCULAR; INTRAVENOUS at 15:08

## 2025-04-10 RX ADMIN — ATORVASTATIN CALCIUM 20 MG: 20 TABLET, FILM COATED ORAL at 06:03

## 2025-04-10 RX ADMIN — HEPARIN SODIUM 5000 UNITS: 5000 INJECTION, SOLUTION INTRAVENOUS; SUBCUTANEOUS at 14:04

## 2025-04-10 RX ADMIN — OXYCODONE HYDROCHLORIDE 5 MG: 5 TABLET ORAL at 08:58

## 2025-04-10 RX ADMIN — OXYCODONE HYDROCHLORIDE 5 MG: 5 TABLET ORAL at 20:44

## 2025-04-10 RX ADMIN — HEPARIN SODIUM 5000 UNITS: 5000 INJECTION, SOLUTION INTRAVENOUS; SUBCUTANEOUS at 20:45

## 2025-04-10 RX ADMIN — OXYCODONE HYDROCHLORIDE 5 MG: 5 TABLET ORAL at 00:44

## 2025-04-10 RX ADMIN — ACETAMINOPHEN 1000 MG: 500 TABLET, FILM COATED ORAL at 12:36

## 2025-04-10 RX ADMIN — AMPICILLIN SODIUM, SULBACTAM SODIUM 3 G: 2; 1 INJECTION, POWDER, FOR SOLUTION INTRAMUSCULAR; INTRAVENOUS at 20:48

## 2025-04-10 RX ADMIN — OXYCODONE HYDROCHLORIDE 5 MG: 5 TABLET ORAL at 17:06

## 2025-04-10 RX ADMIN — ONDANSETRON 4 MG: 4 TABLET, ORALLY DISINTEGRATING ORAL at 12:40

## 2025-04-10 ASSESSMENT — ENCOUNTER SYMPTOMS
PALPITATIONS: 0
HEMOPTYSIS: 0
FEVER: 0
CHILLS: 0
FLANK PAIN: 1
SHORTNESS OF BREATH: 0
NEUROLOGICAL NEGATIVE: 1
COUGH: 0
DIARRHEA: 0
BACK PAIN: 1
ABDOMINAL PAIN: 1
BLOOD IN STOOL: 0
CONSTIPATION: 1
MYALGIAS: 0

## 2025-04-10 ASSESSMENT — PAIN DESCRIPTION - PAIN TYPE
TYPE: ACUTE PAIN
TYPE: CHRONIC PAIN
TYPE: CHRONIC PAIN
TYPE: ACUTE PAIN

## 2025-04-10 NOTE — CARE PLAN
The patient is Stable - Low risk of patient condition declining or worsening    Shift Goals  Clinical Goals: Pain management, safety    Progress made toward(s) clinical / shift goals:    Problem: Knowledge Deficit - Standard  Goal: Patient and family/care givers will demonstrate understanding of plan of care, disease process/condition, diagnostic tests and medications  Outcome: Progressing  Note: Pt receptive to edu, actively involved in POC. Ongoing education     Problem: Pain - Standard  Goal: Alleviation of pain or a reduction in pain to the patient’s comfort goal  Outcome: Progressing  Note: Pt reports pain managed w PRN analgesic and repositioning       Patient is not progressing towards the following goals:

## 2025-04-10 NOTE — ASSESSMENT & PLAN NOTE
Chronic, states he takes Linzess but still has 1 bowel movement every 3 to 4 days    Continue home Linzess  Hydration as above  Bowel regiment  Address borderline hypercalcemia as above

## 2025-04-10 NOTE — ASSESSMENT & PLAN NOTE
Uncontrolled  Patient mentioned he takes 10-20 Tums daily  Due to possible contribution to borderline high calcium level and nephrolithiasis, will hold Tums      Continue omeprazole 20  Recommended decreasing tums intake. May need to follow with GI outpatient for possible EGD given severe GERD.

## 2025-04-10 NOTE — HOSPITAL COURSE
74 yo male with pmh of chronic constipation presenting 4/9/25 with lower abdominal pain and painful dark urination that had worsened in the days leading up to admission. In ED was noted to have UA with positive nitrites, moderate LE, 6-10 wbc/hpf, few bacteria and calcium oxalate crystals. CT abdomen noted bilateral non-obstructive nephrolithiasis. He did report Consuming 10-20 Tums per day for GERD. Also notedto have ELIZABETH w/ Cr of 1.7 with baseline 1.0. Admitted for management of ELIZABETH and UTI.

## 2025-04-10 NOTE — ASSESSMENT & PLAN NOTE
Noted on CT abdomen pelvis  Right cystic lesions  Small hypodense lesion in posterior left kidney measuring 7 mm

## 2025-04-10 NOTE — ED NOTES
Bedside report received from off going RN/tech: Sourav RN, assumed care of patient.  POC discussed with patient. Call light within reach, all needs addressed at this time.       Fall risk interventions in place: Move the patient closer to the nurse's station, Patient's personal possessions are with in their safe reach, Place socks on patient, Give patient urinal if applicable, Keep floor surfaces clean and dry, and Accompanied to restroom (all applicable per [place] Fall risk assessment)   Continuous monitoring: Pulse Ox or Blood Pressure  IVF/IV medications: Not Applicable   Oxygen: Room Air  Bedside sitter: Not Applicable   Isolation: Not Applicable

## 2025-04-10 NOTE — ASSESSMENT & PLAN NOTE
Creatinine 1.7 from baseline 1  CT abdomen with nephrolithiasis but no obstruction or hydronephrosis  Hyaline casts on UA  Could be prerenal from dehydration, versus postrenal from obstruction  Denies NSAID use    Improved to 1.4  BLADDER SCAN ORDERED.   Fluids Dc'd.  Trend on CMP  Avoid nephrotoxins, renally dose meds  Hold home lisinopril, use heparin for DVT PPx

## 2025-04-10 NOTE — ASSESSMENT & PLAN NOTE
Slow, difficult movement on observation. Concern for ability to care for self at home.  -PT/OT recommending HH PT

## 2025-04-10 NOTE — ED NOTES
Pt transferred upstairs with all patient belongings, with transport staff. Pt equestedd is omeprazole and something for GERD, it was approved by pharmacy after the patient left the floor. I explained to the patient that it will likely be approved when he is upstairs on the floor. Pt agreeable.

## 2025-04-10 NOTE — PROGRESS NOTES
[facility] Internal Medicine Daily Progress Note    Date of Service  4/10/2025    [facility] Team: R IM [facility] Team   Attending: Alyssa Gonzalez M.d.  Senior Resident: Dr. Veliz  Intern:  Dr. Guzman  Contact Number: [phone]    Chief Complaint  Fausto Martin is a 75 y.o. male admitted 4/9/2025 with elizabeth and uti.    Hospital Course  74 yo male with pmh of chronic constipation presenting 4/9/25 with lower abdominal pain and painful dark urination that had worsened in the days leading up to admission. In ED was noted to have UA with positive nitrites, moderate LE, 6-10 wbc/hpf, few bacteria and calcium oxalate crystals. CT abdomen noted bilateral non-obstructive nephrolithiasis. He did report Consuming 10-20 Tums per day for GERD. Also notedto have ELIZABETH w/ Cr of 1.7 with baseline 1.0. Admitted for management of ELIZABETH and UTI.        Interval Problem Update  -Persistent low abdominal pain. Improves with meals. Back pain primarily lower.  -Endorses dysuria. Radiating burning sensation up back when urinating.  -Confirms 10-20 tums per day for GERD. Denies EGD in past.   -Ceftriaxone -> Augmentin given hx of E. Faecalis uti's.     I have discussed this patient's plan of care and discharge plan at IDT rounds today with Case Management, Nursing, Nursing leadership, and other members of the IDT team.    Consultants/Specialty  NA    Code Status  Full Code    Disposition  The patient is not medically cleared for discharge to home or a post-acute facility.      I have placed the appropriate orders for post-discharge needs.    Review of Systems  Review of Systems   Constitutional:  Negative for chills and fever.   Respiratory:  Negative for cough, hemoptysis and shortness of breath.    Cardiovascular:  Negative for chest pain, palpitations and leg swelling.   Gastrointestinal:  Positive for abdominal pain (Low abdomen) and constipation. Negative for blood in stool, diarrhea and melena.   Genitourinary:  Positive for dysuria, flank pain and  frequency. Negative for hematuria and urgency.   Musculoskeletal:  Positive for back pain. Negative for myalgias.   Neurological: Negative.         Physical Exam  Temp:  [35.9 °C (96.6 °F)-36.6 °C (97.9 °F)] 35.9 °C (96.6 °F)  Pulse:  [61-88] 81  Resp:  [18-20] 18  BP: (100-152)/(63-91) 152/91  SpO2:  [90 %-99 %] 91 %    Physical Exam  Constitutional:       Appearance: He is ill-appearing. He is not diaphoretic.      Comments: Poor hygiene and dentition.   HENT:      Head: Normocephalic and atraumatic.   Eyes:      Extraocular Movements: Extraocular movements intact.      Pupils: Pupils are equal, round, and reactive to light.   Cardiovascular:      Rate and Rhythm: Normal rate and regular rhythm.      Pulses: Normal pulses.      Heart sounds: Normal heart sounds.   Pulmonary:      Effort: Pulmonary effort is normal.      Breath sounds: Normal breath sounds.   Abdominal:      General: There is no distension.      Tenderness: There is abdominal tenderness (suprapubic tenderness). There is no right CVA tenderness, left CVA tenderness, guarding or rebound.   Musculoskeletal:         General: No swelling.   Skin:     General: Skin is warm and dry.   Neurological:      Mental Status: He is alert and oriented to person, place, and time.      Comments: Speech slowed, unsure if this is his baseline.         Fluids    Intake/Output Summary (Last 24 hours) at 4/10/2025 1212  Last data filed at 4/10/2025 1012  Gross per 24 hour   Intake 400 ml   Output 920 ml   Net -520 ml       Laboratory  Recent Labs     04/09/25  1527 04/10/25  0300   WBC 8.2 7.1   RBC 4.89 4.12*   HEMOGLOBIN 15.8 13.2*   HEMATOCRIT 45.9 38.3*   MCV 93.9 93.0   MCH 32.3 32.0   MCHC 34.4 34.5   RDW 44.4 43.8   PLATELETCT 190 145*   MPV 10.7 10.8     Recent Labs     04/09/25  1527 04/10/25  0300   SODIUM 142 141   POTASSIUM 4.0 4.7   CHLORIDE 106 109   CO2 23 23   GLUCOSE 132* 98   BUN 25* 27*   CREATININE 1.70* 1.51*   CALCIUM 10.0 8.5                    Imaging  CT-ABDOMEN-PELVIS W/O   Final Result         1. Bilateral nephrolithiasis.   2. Hepatic and renal cysts.   3. Atherosclerosis with ectasia of the abdominal aorta.   4. Mild diverticulosis.           Assessment/Plan  Problem Representation:    * UTI (urinary tract infection)- (present on admission)  Assessment & Plan  Presenting with worsening lower abdominal/suprapubic pain  UA positive for bacteria, leukocyte esterase, nitrates  No pyelo on CT, although demonstrating bilateral nephrolithiasis along with renal cysts  Denies recent antibiotic use    Transitioned to Augmentin given past urine cultures growing E. Faecalis.  Investigate possible contribution of urinary retention and nephrolithiasis as below    Physical deconditioning  Assessment & Plan  Slow, difficult movement on observation. Concern for ability to care for self at home.  -PT/OT    Hepatic cyst  Assessment & Plan  Noted on prior imaging, seen on admission CT abdomen pelvis    Aortic ectasia, abdominal (HCC)  Assessment & Plan  CT AP incidental finding of ectasia abdominal aorta 2.9 cm  No acute intervention    Renal cyst  Assessment & Plan  Noted on CT abdomen pelvis  Right cystic lesions  Small hypodense lesion in posterior left kidney measuring 7 mm    ELIZABETH (acute kidney injury) (HCC)- (present on admission)  Assessment & Plan  Creatinine 1.7 from baseline 1  CT abdomen with nephrolithiasis but no obstruction or hydronephrosis  Hyaline casts on UA  Could be prerenal from dehydration, versus postrenal from obstruction  Denies NSAID use    Improved to 1.4  BLADDER SCAN ORDERED.   Fluids Dc'd.  Trend on CMP  Avoid nephrotoxins, renally dose meds  Hold home lisinopril, use heparin for DVT PPx    Lower abdominal pain- (present on admission)  Assessment & Plan  Acute on chronic  Likely chronic from constipation, combined with now acute component of UTI  CT abdomen pelvis unremarkable, demonstrates diverticulosis without diverticulitis, not that  constipated on it    Addressed UTI as above  As needed and scheduled Tylenol  Opioids for breakthrough pain, will start low-dose to avoid future constipation  If renal function recovers consider using NSAIDs instead of opioids    Hyperlipidemia- (present on admission)  Assessment & Plan  Continue home atorvastatin    Joint pain  Assessment & Plan  Chronic  Continue home gabapentin  Watch gabapentin dosing with change in renal function    BPH (benign prostatic hyperplasia)- (present on admission)  Assessment & Plan  Previously was using Mccabe for retention, until getting TURP 2024  Since then states he had no problems with urination while on his tamsulosin  States current difficulty urinating but could be due to pain from UTI, vs retention again  Constipation can also contribute to retention    Bladder scan  Continue home tamsulosin  If retaining consider cath/Mccabe  Address constipation as above    Constipation- (present on admission)  Assessment & Plan  Chronic, states he takes Linzess but still has 1 bowel movement every 3 to 4 days    Continue home Linzess  Hydration as above  Bowel regiment  Address borderline hypercalcemia as above    Nephrolithiasis- (present on admission)  Assessment & Plan  CT abdomen with bilateral nephrolithiasis  UA with calcium oxalate crystals  Risk factors from borderline elevated calcium of 10, also dehydrated  Patient states he takes 10-20 Tums every day for GERD    Encouraged oral fluid intake.  Advised on cessation of overuse of Tums    GERD (gastroesophageal reflux disease)- (present on admission)  Assessment & Plan  Uncontrolled  Patient mentioned he takes 10-20 Tums daily  Due to possible contribution to borderline high calcium level and nephrolithiasis, will hold Tums    Trial of GI cocktail  Start omeprazole 20  Recommended decreasing tums intake. May need to follow with GI outpatient for possible EGD given severe GERD.    Hypertension- (present on admission)  Assessment &  Plan  History of  Soft BP in ED with SBP 100s 110s    Hold home amlodipine and lisinopril for now due to risk of sepsis in future  Hold home lisinopril for ELIZABETH         VTE prophylaxis: heparin ppx    I have performed a physical exam and reviewed and updated ROS and Plan today (4/10/2025). In review of yesterday's note (4/9/2025), there are no changes except as documented above.

## 2025-04-10 NOTE — PROGRESS NOTES
Pt becoming diaphoretic, feeling weaker now. No chest pain or shortness of breath. Vitals stable at this time, doesn't meet sepsis criteria yet but appears to be headed in that direction based on our evaluation. Urine culture was never sent yesterday at admission, so I've ordered stat urine and blood cultures to be sent. Switching antibiotics to IV Unasyn until the cultures are collected. Chose Unasyn over Ceftriaxone due to prior urine cultures growing Enterococcus.     If patient starts to decompensate or become septic, recommend escalating abx to Zosyn.

## 2025-04-10 NOTE — PROGRESS NOTES
4 Eyes Skin Assessment Completed by Sridhar Javier, RN and Martina Lopez, OTTONIEL.    Head WDL  Ears WDL  Nose WDL  Mouth WDL  Neck WDL  Breast/Chest Redness and Scab  Shoulder Blades WDL  Spine WDL  (R) Arm/Elbow/Hand WDL  (L) Arm/Elbow/Hand WDL  Abdomen WDL  Groin WDL  Scrotum/Coccyx/Buttocks WDL  (R) Leg WDL  (L) Leg WDL  (R) Heel/Foot/Toe WDL  (L) Heel/Foot/Toe WDL          Devices In Places Nasal Cannula      Interventions In Place Gray Ear Foams    Possible Skin Injury No    Pictures Uploaded Into Epic N/A  Wound Consult Placed N/A  RN Wound Prevention Protocol Ordered No

## 2025-04-10 NOTE — ASSESSMENT & PLAN NOTE
CT abdomen with bilateral nephrolithiasis  UA with calcium oxalate crystals  Risk factors from borderline elevated calcium of 10, also dehydrated  Patient states he takes 10-20 Tums every day for GERD    Encouraged oral fluid intake.  Advised on cessation of overuse of Tums

## 2025-04-10 NOTE — ED NOTES
Med Rec complete per patient   Allergies reviewed  Antibiotics in the past 30 days:no  Anticoagulant in past 14 days:no  Pharmacy patient utilizes[facility] on [address]

## 2025-04-10 NOTE — H&P
[facility] Internal Medicine History & Physical Note    Date of Service  4/9/2025    [facility] Team: JAYMIE   Attending: Priscilla Jennings M.d.  Senior Resident: Dr. Schrader  Intern: Not applicable  Contact Number: [phone]    Primary Care Physician  ANTHONY MCCLURE M.D.    Consultants  None    Code Status  Full Code    Chief Complaint  Chief Complaint   Patient presents with    Abdominal Pain     Lower abdominal pain x2 weeks, worsening today. 10/10 sharp stabbing pain. -n/v/d. Last BM this morning.     Painful Urination     X1 week, unsure if there is an odor. +dark urine       History of Presenting Illness (HPI):  Fausto Martin is a 75 y.o. male with past medical history notable for chronic constipation, who presented 4/9/2025 with lower abdominal pain and painful dark urination.    Patient states that he has lower central abdominal pain for chronically for years, used to be 3 out of 10 intensity, and improves with bowel movement.  Chronically constipated, and regularly takes laxatives, but still has 1 bowel movement every 3 to 4 days.  This morning he felt he had similar but much worse sharp pain 7 out of 10 intensity.  He had a liquid bowel movement this morning with some relief of pain, but soon worsened again.  Associated dark urine, and pain traveling down penile region when urinating, and feeling of incomplete urination.  Also decreased urine output.  States he may have not had adequate water intake recently, but denies any OTC NSAID use.  Denies any hospitalizations or antibiotic use in the last 3 months.  He does state he had UTIs prior, also this pain feels somewhat different.    On initial presented the ED he had vitals of temperature 97.9, heart rate 61, respiratory rate 18, blood pressure 100/66.  He was satting 99% on room air.  His CBC demonstrated WBC 8.2, hemoglobin 15.8, platelets 190.  CHEM panel with BUN 25, creatinine 1.7, compared to his baseline of creatinine 1, along with corrected calcium 10.  Lipase  negative.  UA with dark yellow urine, positive nitrates, moderate leukocyte esterase, 6-10 WBCs, 0-2 RBCs, 6-10 epithelial cells, few bacteria, calcium oxalate crystals, and hyaline cast.  CT abdomen pelvis without contrast demonstrating bilateral nephrolithiasis, hepatic and renal cysts, ectasia of the abdominal aorta 2.9 cm. And mild diverticulosis.  While in the ED patient received 1 L of NS, and 1 g Tylenol.  He will be admitted to the medicine floor for management of ELIZABETH and UTI.    I discussed the plan of care with patient.    Review of Systems  Review of Systems   Constitutional:  Negative for chills and fever.   HENT:  Negative for hearing loss and sore throat.    Eyes:  Negative for blurred vision and double vision.   Respiratory:  Positive for cough (chronic). Negative for sputum production and shortness of breath.    Cardiovascular:  Negative for chest pain and palpitations.   Gastrointestinal:  Positive for abdominal pain, constipation (chronic) and diarrhea (1x morning). Negative for nausea and vomiting.   Genitourinary:  Positive for dysuria and flank pain. Negative for hematuria.   Musculoskeletal:  Positive for joint pain. Negative for falls and myalgias.   Skin:  Negative for itching and rash.   Neurological:  Negative for dizziness, focal weakness, seizures, loss of consciousness and headaches.       Past Medical History   has a past medical history of High cholesterol, Hypertension, and Neuropathy.    Surgical History   has no past surgical history on file.     Family History  family history is not on file.   Family history reviewed with patient.     Social History  Tobacco: Prior significant history of smoking multiple packs every day, quit 10 years ago  Alcohol: Denies recent use  Recreational drugs (illegal or prescription): Marijuana  Employment: Not applicable  Living Situation: By himself  Recent Travel: Denies  Primary Care Provider: Reviewed    Other (stressors, spirituality, exposures):  Not applicable    Allergies  No Known Allergies    Medications  Prior to Admission Medications   Prescriptions Last Dose Informant Patient Reported? Taking?   Acetaminophen (ACETAMIN PO)  Patient Yes No   Sig: Take 2 Tablets by mouth 1 time a day as needed (mild pain).   amLODIPine (NORVASC) 5 MG Tab  Patient Yes No   Sig: Take 2.5 mg by mouth every day. 1/2 tablet=2.5mg   atorvastatin (LIPITOR) 20 MG Tab  Patient Yes No   Sig: Take 20 mg by mouth every evening.   gabapentin (NEURONTIN) 100 MG Cap  Patient Yes No   Sig: Take 300 mg by mouth 1 time a day as needed (pain). 3 capsules=300mg   linaCLOtide (LINZESS) 145 MCG Cap  Patient Yes No   Sig: Take 1 Capsule by mouth every morning.   lisinopril (PRINIVIL) 40 MG tablet  Patient Yes No   Sig: Take 40 mg by mouth every day.   tamsulosin (FLOMAX) 0.4 MG capsule  Patient No No   Sig: Take 1 Capsule by mouth 1/2 hour after breakfast.      Facility-Administered Medications: None       Physical Exam  Temp:  [36 °C (96.8 °F)-36.6 °C (97.9 °F)] 36 °C (96.8 °F)  Pulse:  [61-88] 67  Resp:  [18-19] 19  BP: (100-122)/(66-79) 116/79  SpO2:  [91 %-99 %] 93 %  Blood Pressure : 122/71   Temperature: 36.6 °C (97.9 °F)   Pulse: 69   Respiration: 18   Pulse Oximetry: 91 %       Physical Exam  Constitutional:       General: He is not in acute distress.     Appearance: Normal appearance. He is not ill-appearing or toxic-appearing.   HENT:      Head: Normocephalic and atraumatic.      Right Ear: External ear normal.      Left Ear: External ear normal.      Nose: Nose normal. No rhinorrhea.      Mouth/Throat:      Mouth: Mucous membranes are moist.   Eyes:      General: No scleral icterus.        Right eye: No discharge.         Left eye: No discharge.      Extraocular Movements: Extraocular movements intact.   Cardiovascular:      Rate and Rhythm: Normal rate and regular rhythm.      Pulses: Normal pulses.      Heart sounds: No murmur heard.     No gallop.   Pulmonary:      Effort:  "Pulmonary effort is normal. No respiratory distress.      Breath sounds: Normal breath sounds. No wheezing or rales.   Abdominal:      General: Bowel sounds are normal. There is no distension.      Palpations: Abdomen is soft.      Tenderness: There is abdominal tenderness (lower quadrant / suprapubic area). There is no right CVA tenderness (states it is tender, but not severe and unclear if due to pressing on abd/bladder), left CVA tenderness, guarding or rebound.   Musculoskeletal:         General: No swelling or tenderness.      Right lower leg: No edema.      Left lower leg: No edema.   Skin:     General: Skin is warm.      Capillary Refill: Capillary refill takes less than 2 seconds.      Coloration: Skin is not jaundiced.      Findings: No rash.   Neurological:      Mental Status: He is alert and oriented to person, place, and time. Mental status is at baseline.   Psychiatric:         Mood and Affect: Mood normal.         Behavior: Behavior normal.         Laboratory:  Recent Labs     04/09/25  1527   WBC 8.2   RBC 4.89   HEMOGLOBIN 15.8   HEMATOCRIT 45.9   MCV 93.9   MCH 32.3   MCHC 34.4   RDW 44.4   PLATELETCT 190   MPV 10.7     Recent Labs     04/09/25  1527   SODIUM 142   POTASSIUM 4.0   CHLORIDE 106   CO2 23   GLUCOSE 132*   BUN 25*   CREATININE 1.70*   CALCIUM 10.0     Recent Labs     04/09/25  1527   ALTSGPT 30   ASTSGOT 31   ALKPHOSPHAT 91   TBILIRUBIN 1.6*   LIPASE 25   GLUCOSE 132*         No results for input(s): \"NTPROBNP\" in the last 72 hours.      No results for input(s): \"TROPONINT\" in the last 72 hours.    Imaging:  CT-ABDOMEN-PELVIS W/O   Final Result         1. Bilateral nephrolithiasis.   2. Hepatic and renal cysts.   3. Atherosclerosis with ectasia of the abdominal aorta.   4. Mild diverticulosis.          X-Ray:  I have personally reviewed the images and compared with prior images.    Assessment/Plan:  Problem Representation: 75-year-old male with past medical history of chronic " constipation and uncontrolled GERD taking multiple Tums daily, presenting for acute on chronic lower abdominal/suprapubic pain, along with ELIZABETH.  Likely pain chronically from constipation, along with acute contribution from UTI.     I anticipate this patient will require at least two midnights for appropriate medical management, necessitating inpatient admission because ELIZABETH and UTI    Patient will need a Med/Surg bed on MEDICAL service .  The need is secondary to need for IV medications and inpatient monitoring.    * UTI (urinary tract infection)- (present on admission)  Assessment & Plan  Presenting with worsening lower abdominal/suprapubic pain  UA positive for bacteria, leukocyte esterase, nitrates  No pyelo on CT, although demonstrating bilateral nephrolithiasis along with renal cysts  Denies recent antibiotic use    Ceftriaxone  Investigate possible contribution of urinary retention and nephrolithiasis as below    ELIZABETH (acute kidney injury) (HCC)- (present on admission)  Assessment & Plan  Creatinine 1.7 from baseline 1  CT abdomen with nephrolithiasis but no obstruction or hydronephrosis  Hyaline casts on UA  Could be prerenal from dehydration, versus postrenal from obstruction  Denies NSAID use    Bladder scan  Fluids as above  Trend on CMP  Avoid nephrotoxins, renally dose meds  Hold home lisinopril, use heparin for DVT PPx    Lower abdominal pain- (present on admission)  Assessment & Plan  Acute on chronic  Likely chronic from constipation, combined with now acute component of UTI  CT abdomen pelvis unremarkable, demonstrates diverticulosis without diverticulitis, not that constipated on it    Addressed UTI as above  As needed and scheduled Tylenol  Opioids for breakthrough pain, will start low-dose to avoid future constipation  If renal function recovers consider using NSAIDs instead of opioids    BPH (benign prostatic hyperplasia)- (present on admission)  Assessment & Plan  Previously was using Mccabe for  retention, until getting TURP 2024  Since then states he had no problems with urination while on his tamsulosin  States current difficulty urinating but could be due to pain from UTI, vs retention again  Constipation can also contribute to retention    Bladder scan  Continue home tamsulosin  If retaining consider cath/Mccabe  Address constipation as above    Constipation- (present on admission)  Assessment & Plan  Chronic, states he takes Linzess but still has 1 bowel movement every 3 to 4 days    Continue home Linzess  Hydration as above  Bowel regiment  Address borderline hypercalcemia as above    Nephrolithiasis- (present on admission)  Assessment & Plan  CT abdomen with bilateral nephrolithiasis  UA with calcium oxalate crystals  Risk factors from borderline elevated calcium of 10, also dehydrated  Patient states he takes 10-20 Tums every day for GERD    Status post 1 L NS in ED, continue  cc  Advised on cessation of overuse of Tums    GERD (gastroesophageal reflux disease)- (present on admission)  Assessment & Plan  Uncontrolled  Patient mentioned he takes 10-20 Tums daily  Due to possible contribution to borderline high calcium level and nephrolithiasis, will hold Tums    Trial of GI cocktail  Start omeprazole 20    Hepatic cyst  Assessment & Plan  Noted on prior imaging, seen on admission CT abdomen pelvis    Aortic ectasia, abdominal (HCC)  Assessment & Plan  CT AP incidental finding of ectasia abdominal aorta 2.9 cm  No acute intervention    Renal cyst  Assessment & Plan  Noted on CT abdomen pelvis  Right cystic lesions  Small hypodense lesion in posterior left kidney measuring 7 mm    Hyperlipidemia- (present on admission)  Assessment & Plan  Continue home atorvastatin    Joint pain  Assessment & Plan  Chronic  Continue home gabapentin  Watch gabapentin dosing with change in renal function    Hypertension- (present on admission)  Assessment & Plan  History of  Soft BP in ED with SBP 100s 110s    Hold  home amlodipine and lisinopril for now due to risk of sepsis in future  Hold home lisinopril for ELIZABETH        VTE prophylaxis: heparin ppx

## 2025-04-10 NOTE — ASSESSMENT & PLAN NOTE
Presenting with worsening lower abdominal/suprapubic pain  UA positive for bacteria, leukocyte esterase, nitrates  No pyelo on CT, although demonstrating bilateral nephrolithiasis along with renal cysts  Denies recent antibiotic use    Continue unasyn given past urine cultures growing E. Faecalis.  Investigate possible contribution of urinary retention and nephrolithiasis as below

## 2025-04-10 NOTE — ASSESSMENT & PLAN NOTE
Previously was using Mccabe for retention, until getting TURP 2024  Since then states he had no problems with urination while on his tamsulosin  States current difficulty urinating but could be due to pain from UTI, vs retention again  Constipation can also contribute to retention    Bladder scan. Not retaining.  Continue home tamsulosin  If retaining consider cath/Mccabe  Address constipation as above

## 2025-04-10 NOTE — ASSESSMENT & PLAN NOTE
History of  Soft BP in ED with SBP 100s 110s    Continue to hold home meds due to orthostatic hypotention.  Hold home amlodipine and lisinopril for now due to risk of sepsis in future  Hold home lisinopril for ELIZABETH

## 2025-04-11 PROBLEM — I95.1 ORTHOSTATIC HYPOTENSION: Status: ACTIVE | Noted: 2025-04-11

## 2025-04-11 LAB
ALBUMIN SERPL BCP-MCNC: 3.4 G/DL (ref 3.2–4.9)
BUN SERPL-MCNC: 19 MG/DL (ref 8–22)
CALCIUM ALBUM COR SERPL-MCNC: 8.3 MG/DL (ref 8.5–10.5)
CALCIUM SERPL-MCNC: 7.8 MG/DL (ref 8.5–10.5)
CHLORIDE SERPL-SCNC: 106 MMOL/L (ref 96–112)
CO2 SERPL-SCNC: 19 MMOL/L (ref 20–33)
CREAT SERPL-MCNC: 1.44 MG/DL (ref 0.5–1.4)
ERYTHROCYTE [DISTWIDTH] IN BLOOD BY AUTOMATED COUNT: 43.3 FL (ref 35.9–50)
GFR SERPLBLD CREATININE-BSD FMLA CKD-EPI: 51 ML/MIN/1.73 M 2
GLUCOSE SERPL-MCNC: 110 MG/DL (ref 65–99)
HCT VFR BLD AUTO: 37 % (ref 42–52)
HGB BLD-MCNC: 12.6 G/DL (ref 14–18)
MAGNESIUM SERPL-MCNC: 1.8 MG/DL (ref 1.5–2.5)
MCH RBC QN AUTO: 31.8 PG (ref 27–33)
MCHC RBC AUTO-ENTMCNC: 34.1 G/DL (ref 32.3–36.5)
MCV RBC AUTO: 93.4 FL (ref 81.4–97.8)
PHOSPHATE SERPL-MCNC: 2.8 MG/DL (ref 2.5–4.5)
PLATELET # BLD AUTO: 129 K/UL (ref 164–446)
PMV BLD AUTO: 11.2 FL (ref 9–12.9)
POTASSIUM SERPL-SCNC: 3.9 MMOL/L (ref 3.6–5.5)
RBC # BLD AUTO: 3.96 M/UL (ref 4.7–6.1)
SODIUM SERPL-SCNC: 133 MMOL/L (ref 135–145)
WBC # BLD AUTO: 6.4 K/UL (ref 4.8–10.8)

## 2025-04-11 PROCEDURE — 700105 HCHG RX REV CODE 258

## 2025-04-11 PROCEDURE — 97535 SELF CARE MNGMENT TRAINING: CPT

## 2025-04-11 PROCEDURE — 51798 US URINE CAPACITY MEASURE: CPT

## 2025-04-11 PROCEDURE — 97162 PT EVAL MOD COMPLEX 30 MIN: CPT

## 2025-04-11 PROCEDURE — 700111 HCHG RX REV CODE 636 W/ 250 OVERRIDE (IP): Mod: JZ

## 2025-04-11 PROCEDURE — 85027 COMPLETE CBC AUTOMATED: CPT

## 2025-04-11 PROCEDURE — A9270 NON-COVERED ITEM OR SERVICE: HCPCS

## 2025-04-11 PROCEDURE — 80069 RENAL FUNCTION PANEL: CPT

## 2025-04-11 PROCEDURE — 770001 HCHG ROOM/CARE - MED/SURG/GYN PRIV*

## 2025-04-11 PROCEDURE — 700111 HCHG RX REV CODE 636 W/ 250 OVERRIDE (IP)

## 2025-04-11 PROCEDURE — 99233 SBSQ HOSP IP/OBS HIGH 50: CPT | Mod: GC | Performed by: INTERNAL MEDICINE

## 2025-04-11 PROCEDURE — 83735 ASSAY OF MAGNESIUM: CPT

## 2025-04-11 PROCEDURE — 700102 HCHG RX REV CODE 250 W/ 637 OVERRIDE(OP)

## 2025-04-11 RX ORDER — OMEPRAZOLE 20 MG/1
20 CAPSULE, DELAYED RELEASE ORAL DAILY
Qty: 30 CAPSULE | Refills: 30 | OUTPATIENT
Start: 2025-04-11

## 2025-04-11 RX ORDER — SODIUM CHLORIDE 9 MG/ML
500 INJECTION, SOLUTION INTRAVENOUS ONCE
Status: COMPLETED | OUTPATIENT
Start: 2025-04-11 | End: 2025-04-11

## 2025-04-11 RX ORDER — CALCIUM CARBONATE 500 MG/1
1000 TABLET, CHEWABLE ORAL ONCE
Status: DISCONTINUED | OUTPATIENT
Start: 2025-04-11 | End: 2025-04-11

## 2025-04-11 RX ORDER — CALCIUM GLUCONATE 20 MG/ML
1 INJECTION, SOLUTION INTRAVENOUS ONCE
Status: COMPLETED | OUTPATIENT
Start: 2025-04-11 | End: 2025-04-11

## 2025-04-11 RX ORDER — SODIUM CHLORIDE 9 MG/ML
1000 INJECTION, SOLUTION INTRAVENOUS ONCE
Status: COMPLETED | OUTPATIENT
Start: 2025-04-11 | End: 2025-04-11

## 2025-04-11 RX ADMIN — OMEPRAZOLE 20 MG: 20 CAPSULE, DELAYED RELEASE ORAL at 06:03

## 2025-04-11 RX ADMIN — OXYCODONE HYDROCHLORIDE 5 MG: 5 TABLET ORAL at 16:11

## 2025-04-11 RX ADMIN — HEPARIN SODIUM 5000 UNITS: 5000 INJECTION, SOLUTION INTRAVENOUS; SUBCUTANEOUS at 06:02

## 2025-04-11 RX ADMIN — SENNOSIDES AND DOCUSATE SODIUM 2 TABLET: 50; 8.6 TABLET ORAL at 16:13

## 2025-04-11 RX ADMIN — AMPICILLIN SODIUM, SULBACTAM SODIUM 3 G: 2; 1 INJECTION, POWDER, FOR SOLUTION INTRAMUSCULAR; INTRAVENOUS at 08:23

## 2025-04-11 RX ADMIN — AMPICILLIN SODIUM, SULBACTAM SODIUM 3 G: 2; 1 INJECTION, POWDER, FOR SOLUTION INTRAMUSCULAR; INTRAVENOUS at 03:42

## 2025-04-11 RX ADMIN — CALCIUM GLUCONATE 1 G: 20 INJECTION, SOLUTION INTRAVENOUS at 16:16

## 2025-04-11 RX ADMIN — ATORVASTATIN CALCIUM 20 MG: 20 TABLET, FILM COATED ORAL at 06:02

## 2025-04-11 RX ADMIN — OXYCODONE HYDROCHLORIDE 5 MG: 5 TABLET ORAL at 03:39

## 2025-04-11 RX ADMIN — AMPICILLIN SODIUM, SULBACTAM SODIUM 3 G: 2; 1 INJECTION, POWDER, FOR SOLUTION INTRAMUSCULAR; INTRAVENOUS at 15:28

## 2025-04-11 RX ADMIN — SODIUM CHLORIDE 500 ML: 9 INJECTION, SOLUTION INTRAVENOUS at 17:33

## 2025-04-11 RX ADMIN — OXYCODONE HYDROCHLORIDE 5 MG: 5 TABLET ORAL at 20:48

## 2025-04-11 RX ADMIN — OXYCODONE HYDROCHLORIDE 5 MG: 5 TABLET ORAL at 08:15

## 2025-04-11 RX ADMIN — OXYCODONE HYDROCHLORIDE 5 MG: 5 TABLET ORAL at 11:48

## 2025-04-11 RX ADMIN — HEPARIN SODIUM 5000 UNITS: 5000 INJECTION, SOLUTION INTRAVENOUS; SUBCUTANEOUS at 20:49

## 2025-04-11 RX ADMIN — POLYETHYLENE GLYCOL 3350 1 PACKET: 17 POWDER, FOR SOLUTION ORAL at 08:18

## 2025-04-11 RX ADMIN — SODIUM CHLORIDE 1000 ML: 9 INJECTION, SOLUTION INTRAVENOUS at 13:41

## 2025-04-11 RX ADMIN — AMPICILLIN SODIUM, SULBACTAM SODIUM 3 G: 2; 1 INJECTION, POWDER, FOR SOLUTION INTRAMUSCULAR; INTRAVENOUS at 20:57

## 2025-04-11 RX ADMIN — OXYCODONE HYDROCHLORIDE 5 MG: 5 TABLET ORAL at 00:04

## 2025-04-11 RX ADMIN — TAMSULOSIN HYDROCHLORIDE 0.4 MG: 0.4 CAPSULE ORAL at 08:16

## 2025-04-11 RX ADMIN — HEPARIN SODIUM 5000 UNITS: 5000 INJECTION, SOLUTION INTRAVENOUS; SUBCUTANEOUS at 13:41

## 2025-04-11 RX ADMIN — ACETAMINOPHEN 1000 MG: 500 TABLET, FILM COATED ORAL at 16:12

## 2025-04-11 ASSESSMENT — GAIT ASSESSMENTS
DISTANCE (FEET): 20
GAIT LEVEL OF ASSIST: CONTACT GUARD ASSIST
DEVIATION: BRADYKINETIC

## 2025-04-11 ASSESSMENT — COGNITIVE AND FUNCTIONAL STATUS - GENERAL
STANDING UP FROM CHAIR USING ARMS: A LITTLE
WALKING IN HOSPITAL ROOM: A LITTLE
TURNING FROM BACK TO SIDE WHILE IN FLAT BAD: A LITTLE
MOVING FROM LYING ON BACK TO SITTING ON SIDE OF FLAT BED: A LITTLE
MOVING TO AND FROM BED TO CHAIR: A LITTLE
CLIMB 3 TO 5 STEPS WITH RAILING: A LITTLE
MOBILITY SCORE: 18
SUGGESTED CMS G CODE MODIFIER MOBILITY: CK

## 2025-04-11 ASSESSMENT — ENCOUNTER SYMPTOMS
ABDOMINAL PAIN: 1
BACK PAIN: 1
BLOOD IN STOOL: 0
PALPITATIONS: 0
NEUROLOGICAL NEGATIVE: 1
DIARRHEA: 0
HEMOPTYSIS: 0
FEVER: 0
CHILLS: 0
FLANK PAIN: 0
CONSTIPATION: 1
COUGH: 0
SHORTNESS OF BREATH: 0
MYALGIAS: 0

## 2025-04-11 ASSESSMENT — PAIN DESCRIPTION - PAIN TYPE
TYPE: ACUTE PAIN;CHRONIC PAIN
TYPE: ACUTE PAIN
TYPE: ACUTE PAIN;CHRONIC PAIN
TYPE: ACUTE PAIN

## 2025-04-11 NOTE — THERAPY
Occupational Therapy Contact Note    Patient Name: Fausto Martin  Age:  75 y.o., Sex:  male  Medical Record #: [id number]  Today's Date: 4/11/2025 04/11/25 1205   Interdisciplinary Plan of Care Collaboration   Collaboration Comments OT tx attempted, pt with symptomatic orthostatic hypotension when standing with RN. Will hold until hemodynamics are stabilized and pt able to safely engage in functional activity.

## 2025-04-11 NOTE — THERAPY
Physical Therapy   Initial Evaluation     Patient Name: Fausto Martin  Age:  75 y.o., Sex:  male  Medical Record #: [id number]  Today's Date: 4/11/2025     Precautions  Precautions: Fall Risk    Assessment  Patient is 75 y.o. male with hx of chronic constipation, BPH, HLD, HTN, and GERD. Pt admitted with  lower abdominal pain and painful dark urination, diagnosed with UTI, ELIZABETH, and B nephrolithiasis. PT eval complete, pt currently presents below his functional baseline due to impaired standing tolerance, balance, strength, gait, and overall mobility. However, pt is generally at [facility] for mobility in the room with no AD. Anticipate that pt will progress while admitted and be able to DC home with HHPT once medically cleared. Will follow while admitted.     Plan    Physical Therapy Initial Treatment Plan   Treatment Plan : Bed Mobility, Gait Training, Neuro Re-Education / Balance, Self Care / Home Evaluation, Stair Training, Therapeutic Activities, Therapeutic Exercise  Treatment Frequency: 3 Times per Week  Duration: Until Therapy Goals Met    DC Equipment Recommendations: None  Discharge Recommendations: Recommend home health for continued physical therapy services (once pt is able to ambulate household distances)         Vitals   O2 (LPM) 0   O2 Delivery Device None - Room Air   Vitals Comments BP stable in standing, no symptoms fo orthostatic hypotension   Pain 0 - 10 Group   Location Back   Location Orientation Lower   Description Cramping   Comfort Goal   (not quantified)   Therapist Pain Assessment During Activity;Post Activity Pain Same as Prior to Activity   Non Verbal Descriptors   Non Verbal Scale  Calm   Prior Living Situation   Prior Services None   Housing / Facility 1 Story Apartment / Condo   Steps Into Home 0   Steps In Home 0   Equipment Owned Quad Cane;4-Wheel Walker   Lives with - Patient's Self Care Capacity Alone and Able to Care For Self   Comments reports having a friend who can drive him  to the store or appointments as needed   Prior Level of Functional Mobility   Bed Mobility Independent   Transfer Status Independent   Ambulation Independent   Ambulation Distance limited community distances   Assistive Devices Used   (4WW vs QC depending on day)   Cognition    Level of Consciousness Alert   Comments pleasant and participatory, milldy delayed/lethargic   Active ROM Lower Body    Active ROM Lower Body  WDL   Strength Lower Body   Lower Body Strength  X   Comments mild generalized weakness, generally 4/5 bilaterally   Balance Assessment   Sitting Balance (Static) Fair +   Sitting Balance (Dynamic) Fair   Standing Balance (Static) Fair -   Standing Balance (Dynamic) Fair -   Weight Shift Sitting Fair   Weight Shift Standing Fair   Comments no AD   Bed Mobility    Supine to Sit Standby Assist   Sit to Supine Standby Assist   Gait Analysis   Gait Level Of Assist Contact Guard Assist   Assistive Device None   Distance (Feet) 20   # of Times Distance was Traveled 3   Deviation Bradykinetic   # of Stairs Climbed 0   Weight Bearing Status no restrictions   Functional Mobility   Sit to Stand Standby Assist   Bed, Chair, Wheelchair Transfer Standby Assist   Toilet Transfers Standby Assist   Mobility no AD   6 Clicks Assessment - How much HELP from from another person do you currently need... (If the patient hasn't done an activity recently, how much help from another person do you think he/she would need if he/she tried?)   Turning from your back to your side while in a flat bed without using bedrails? 3   Moving from lying on your back to sitting on the side of a flat bed without using bedrails? 3   Moving to and from a bed to a chair (including a wheelchair)? 3   Standing up from a chair using your arms (e.g., wheelchair, or bedside chair)? 3   Walking in hospital room? 3   Climbing 3-5 steps with a railing? 3   6 clicks Mobility Score 18   Short Term Goals    Short Term Goal # 1 pt will move supine<>eob  with spv in 6 tx for bed mobility.   Short Term Goal # 2 pt will complete spt with fww and spv in 6 tx for functional mobility.   Short Term Goal # 3 pt will ambulate 150 ft with fww and spv in 6 tx for household distances.   Education Group   Education Provided Role of Physical Therapist   Role of Physical Therapist Patient Response Patient;Acceptance;Explanation;Verbal Demonstration   Additional Comments education wtih pt on his CLOF, likely DC home if his mobility progresses, FWW vs 4WW, activity progression whlie admitted   Physical Therapy Initial Treatment Plan    Treatment Plan  Bed Mobility;Gait Training;Neuro Re-Education / Balance;Self Care / Home Evaluation;Stair Training;Therapeutic Activities;Therapeutic Exercise   Treatment Frequency 3 Times per Week   Duration Until Therapy Goals Met   Problem List    Problems Impaired Bed Mobility;Impaired Transfers;Impaired Ambulation;Functional Strength Deficit;Impaired Balance;Decreased Activity Tolerance   Anticipated Discharge Equipment and Recommendations   DC Equipment Recommendations None   Discharge Recommendations Recommend home health for continued physical therapy services  (once pt is able to ambulate household distances)   Interdisciplinary Plan of Care Collaboration   IDT Collaboration with  Nursing   Patient Position at End of Therapy In Bed;Bed Alarm On;Call Light within Reach;Tray Table within Reach;Phone within Reach   Collaboration Comments RN updated   Session Information   Date / Session Number  4/11- 1 (1/3, 4/17)

## 2025-04-11 NOTE — DISCHARGE INSTRUCTIONS
You were admitted for treatment of a urinary tract infection. While here we treated you with IV antibiotics and with improvement in you symptoms of infection, we plan to have you complete a course of oral antibiotics to finish treatment.     To help pass kidney stones, we recommend you increase your water intake. We added a medication, omeprazole, to help treat your Acid Reflux. It is extremely important that you take no more than 6 TUMS per day. The calcium in TUMS is likely the contributing to your constipation and kidney stones. If you find that the omeprazole is not sufficient for your reflux and you are needing to take tums with it everyday, please reach out to your PCP Dr. Fink.

## 2025-04-11 NOTE — PROGRESS NOTES
Message to MD Mariela Veliz: Orthostatics: Sitting /86, standing 112/60 and reports feeling dizzy with activity.

## 2025-04-11 NOTE — CARE PLAN
The patient is Stable - Low risk of patient condition declining or worsening    Shift Goals  Clinical Goals: Safety, antibiotics, pain control, monitor labs and VS  Patient Goals: Rest  Family Goals: RADHA    Progress made toward(s) clinical / shift goals:    Problem: Pain - Standard  Goal: Alleviation of pain or a reduction in pain to the patient’s comfort goal  Outcome: Progressing     Problem: Knowledge Deficit - Standard  Goal: Patient and family/care givers will demonstrate understanding of plan of care, disease process/condition, diagnostic tests and medications  Outcome: Progressing     Problem: Fall Risk  Goal: Patient will remain free from falls  Outcome: Progressing       Patient is not progressing towards the following goals:

## 2025-04-11 NOTE — DISCHARGE PLANNING
Case Management Discharge Planning    Admission Date: 4/9/2025  GMLOS: 2.8  ALOS: 2    6-Clicks ADL Score: 24  6-Clicks Mobility Score: 18      Anticipated Discharge Dispo:      DME Needed: No    Action(s) Taken: Updated Provider/Nurse on Discharge PlanPatient discussed during IDT rounds with medical team. State they will place a physiatry order, SNF referrals sent.    Escalations Completed: Pending Discharge Destination    Medically Clear: No    Next Steps: Case Management will continue to follow for discharge planning needs.    Barriers to Discharge: Medical clearance and Pending Placement    Is the patient up for discharge tomorrow: No

## 2025-04-12 ENCOUNTER — PHARMACY VISIT (OUTPATIENT)
Dept: PHARMACY | Facility: MEDICAL CENTER | Age: 75
End: 2025-04-12
Payer: COMMERCIAL

## 2025-04-12 VITALS
TEMPERATURE: 96.8 F | SYSTOLIC BLOOD PRESSURE: 127 MMHG | BODY MASS INDEX: 29.01 KG/M2 | RESPIRATION RATE: 18 BRPM | HEART RATE: 81 BPM | HEIGHT: 73 IN | DIASTOLIC BLOOD PRESSURE: 85 MMHG | OXYGEN SATURATION: 94 % | WEIGHT: 218.92 LBS

## 2025-04-12 LAB
25(OH)D3 SERPL-MCNC: 12 NG/ML (ref 30–100)
ALBUMIN SERPL BCP-MCNC: 3.4 G/DL (ref 3.2–4.9)
ALBUMIN/GLOB SERPL: 1.4 G/DL
ALP SERPL-CCNC: 64 U/L (ref 30–99)
ALT SERPL-CCNC: 25 U/L (ref 2–50)
ANION GAP SERPL CALC-SCNC: 8 MMOL/L (ref 7–16)
AST SERPL-CCNC: 27 U/L (ref 12–45)
BACTERIA UR CULT: NORMAL
BILIRUB SERPL-MCNC: 1.1 MG/DL (ref 0.1–1.5)
BUN SERPL-MCNC: 15 MG/DL (ref 8–22)
CALCIUM ALBUM COR SERPL-MCNC: 8.3 MG/DL (ref 8.5–10.5)
CALCIUM SERPL-MCNC: 7.8 MG/DL (ref 8.5–10.5)
CHLORIDE SERPL-SCNC: 109 MMOL/L (ref 96–112)
CO2 SERPL-SCNC: 21 MMOL/L (ref 20–33)
CREAT SERPL-MCNC: 1.21 MG/DL (ref 0.5–1.4)
ERYTHROCYTE [DISTWIDTH] IN BLOOD BY AUTOMATED COUNT: 43.8 FL (ref 35.9–50)
GFR SERPLBLD CREATININE-BSD FMLA CKD-EPI: 62 ML/MIN/1.73 M 2
GLOBULIN SER CALC-MCNC: 2.4 G/DL (ref 1.9–3.5)
GLUCOSE SERPL-MCNC: 104 MG/DL (ref 65–99)
HCT VFR BLD AUTO: 36.8 % (ref 42–52)
HGB BLD-MCNC: 12.8 G/DL (ref 14–18)
MCH RBC QN AUTO: 32.4 PG (ref 27–33)
MCHC RBC AUTO-ENTMCNC: 34.8 G/DL (ref 32.3–36.5)
MCV RBC AUTO: 93.2 FL (ref 81.4–97.8)
PLATELET # BLD AUTO: 130 K/UL (ref 164–446)
PMV BLD AUTO: 10.9 FL (ref 9–12.9)
POTASSIUM SERPL-SCNC: 4.3 MMOL/L (ref 3.6–5.5)
PROT SERPL-MCNC: 5.8 G/DL (ref 6–8.2)
PTH-INTACT SERPL-MCNC: 41.3 PG/ML (ref 14–72)
RBC # BLD AUTO: 3.95 M/UL (ref 4.7–6.1)
SIGNIFICANT IND 70042: NORMAL
SITE SITE: NORMAL
SODIUM SERPL-SCNC: 138 MMOL/L (ref 135–145)
SOURCE SOURCE: NORMAL
WBC # BLD AUTO: 6.6 K/UL (ref 4.8–10.8)

## 2025-04-12 PROCEDURE — RXMED WILLOW AMBULATORY MEDICATION CHARGE: Performed by: INTERNAL MEDICINE

## 2025-04-12 PROCEDURE — 82306 VITAMIN D 25 HYDROXY: CPT

## 2025-04-12 PROCEDURE — 700111 HCHG RX REV CODE 636 W/ 250 OVERRIDE (IP): Mod: JZ

## 2025-04-12 PROCEDURE — 85027 COMPLETE CBC AUTOMATED: CPT

## 2025-04-12 PROCEDURE — A9270 NON-COVERED ITEM OR SERVICE: HCPCS

## 2025-04-12 PROCEDURE — 700111 HCHG RX REV CODE 636 W/ 250 OVERRIDE (IP)

## 2025-04-12 PROCEDURE — 700102 HCHG RX REV CODE 250 W/ 637 OVERRIDE(OP)

## 2025-04-12 PROCEDURE — 83970 ASSAY OF PARATHORMONE: CPT

## 2025-04-12 PROCEDURE — 99239 HOSP IP/OBS DSCHRG MGMT >30: CPT | Mod: GC | Performed by: INTERNAL MEDICINE

## 2025-04-12 PROCEDURE — 80053 COMPREHEN METABOLIC PANEL: CPT

## 2025-04-12 PROCEDURE — 700105 HCHG RX REV CODE 258

## 2025-04-12 RX ORDER — OXYCODONE HYDROCHLORIDE 5 MG/1
5 TABLET ORAL 2 TIMES DAILY PRN
Qty: 10 TABLET | Refills: 0 | Status: SHIPPED | OUTPATIENT
Start: 2025-04-12 | End: 2025-04-17

## 2025-04-12 RX ORDER — OMEPRAZOLE 20 MG/1
20 CAPSULE, DELAYED RELEASE ORAL DAILY
Qty: 30 CAPSULE | Refills: 0 | Status: SHIPPED | OUTPATIENT
Start: 2025-04-13

## 2025-04-12 RX ORDER — CALCIUM GLUCONATE 20 MG/ML
1 INJECTION, SOLUTION INTRAVENOUS ONCE
Status: COMPLETED | OUTPATIENT
Start: 2025-04-12 | End: 2025-04-12

## 2025-04-12 RX ORDER — BACLOFEN 5 MG/1
5 TABLET ORAL 2 TIMES DAILY
Qty: 60 TABLET | Refills: 0 | Status: SHIPPED | OUTPATIENT
Start: 2025-04-12 | End: 2025-05-12

## 2025-04-12 RX ORDER — ERGOCALCIFEROL 1.25 MG/1
50000 CAPSULE, LIQUID FILLED ORAL
Qty: 5 CAPSULE | Refills: 0 | Status: SHIPPED | OUTPATIENT
Start: 2025-04-12

## 2025-04-12 RX ORDER — LINACLOTIDE 145 UG/1
290 CAPSULE, GELATIN COATED ORAL SEE ADMIN INSTRUCTIONS
Qty: 30 CAPSULE | Refills: 0 | Status: SHIPPED | OUTPATIENT
Start: 2025-04-12

## 2025-04-12 RX ADMIN — HEPARIN SODIUM 5000 UNITS: 5000 INJECTION, SOLUTION INTRAVENOUS; SUBCUTANEOUS at 05:01

## 2025-04-12 RX ADMIN — AMPICILLIN SODIUM, SULBACTAM SODIUM 3 G: 2; 1 INJECTION, POWDER, FOR SOLUTION INTRAMUSCULAR; INTRAVENOUS at 03:20

## 2025-04-12 RX ADMIN — ACETAMINOPHEN 1000 MG: 500 TABLET, FILM COATED ORAL at 00:16

## 2025-04-12 RX ADMIN — ATORVASTATIN CALCIUM 20 MG: 20 TABLET, FILM COATED ORAL at 05:01

## 2025-04-12 RX ADMIN — OMEPRAZOLE 20 MG: 20 CAPSULE, DELAYED RELEASE ORAL at 05:01

## 2025-04-12 RX ADMIN — OXYCODONE HYDROCHLORIDE 5 MG: 5 TABLET ORAL at 00:16

## 2025-04-12 RX ADMIN — OXYCODONE HYDROCHLORIDE 5 MG: 5 TABLET ORAL at 08:39

## 2025-04-12 RX ADMIN — AMOXICILLIN AND CLAVULANATE POTASSIUM 1 TABLET: 875; 125 TABLET, FILM COATED ORAL at 09:03

## 2025-04-12 RX ADMIN — CALCIUM GLUCONATE 1 G: 20 INJECTION, SOLUTION INTRAVENOUS at 09:05

## 2025-04-12 RX ADMIN — OXYCODONE HYDROCHLORIDE 5 MG: 5 TABLET ORAL at 03:17

## 2025-04-12 RX ADMIN — TAMSULOSIN HYDROCHLORIDE 0.4 MG: 0.4 CAPSULE ORAL at 09:03

## 2025-04-12 ASSESSMENT — PAIN DESCRIPTION - PAIN TYPE
TYPE: ACUTE PAIN;CHRONIC PAIN
TYPE: ACUTE PAIN;CHRONIC PAIN
TYPE: ACUTE PAIN
TYPE: ACUTE PAIN;CHRONIC PAIN
TYPE: ACUTE PAIN

## 2025-04-12 ASSESSMENT — FIBROSIS 4 INDEX: FIB4 SCORE: 3.12

## 2025-04-12 NOTE — PROGRESS NOTES
Pt alert/oriented x4, medicated for lower back pain. IV abx administered, no s/sx adverse rxn. Pt fatigued, resting in bed. Call light within reach, personal belongings available, bed in lowest position, treaded socks on, and bed alarm on. Hourly rounding in place.

## 2025-04-12 NOTE — PROGRESS NOTES
Fausto Martin has been provided discharge instructions, to include follow up care, home medications, and activity/diet reviewed. Copy of discharge instructions in patient chart, signed and reviewed. Patient verbalizes the understanding of the discharge instructions. PIV removed, tip intact, pressure dressing applied. Arm band removed. Patient did not have home meds during admit. Meds to Beds verified and given. Questions and concerns addressed prior to leaving the discharge lounge. Transported via car, accompanied by friend. Patient discharge to home.

## 2025-04-12 NOTE — HOSPITAL COURSE
74 yo male with pmh of chronic constipation presenting 4/9/25 with lower abdominal pain and painful dark urination that had worsened in the days leading up to admission. In ED was noted to have UA with positive nitrites, moderate LE, 6-10 wbc/hpf, few bacteria and calcium oxalate crystals. CT abdomen noted bilateral non-obstructive nephrolithiasis. He did report Consuming 10-20 Tums per day for GERD. Also notedto have ELIZABETH w/ Cr of 1.7 with baseline 1.0. Admitted for management of ELIZABETH and UTI.     4/11 became diaphoretic with exertion. Antibiotics switched to Unasyn given Ucx hx.

## 2025-04-12 NOTE — CARE PLAN
The patient is Stable - Low risk of patient condition declining or worsening    Shift Goals  Clinical Goals: pain managament  Patient Goals: pain management  Family Goals: chintan    Progress made toward(s) clinical / shift goals:      Problem: Pain - Standard  Goal: Alleviation of pain or a reduction in pain to the patient’s comfort goal  Description: Target End Date:  Prior to discharge or change in level of careDocument on Vitals flowsheet1.  Document pain using the appropriate pain scale per order or unit policy2.  Educate and implement non-pharmacologic comfort measures (i.e. relaxation, distraction, massage, cold/heat therapy, etc.)3.  Pain management medications as ordered4.  Reassess pain after pain med administration per policy5.  If opiods administered assess patient's response to pain medication is appropriate per POSS sedation scale6.  Follow pain management plan developed in collaboration with patient and interdisciplinary team (including palliative care or pain specialists if applicable)  Outcome: Progressing     Problem: Knowledge Deficit - Standard  Goal: Patient and family/care givers will demonstrate understanding of plan of care, disease process/condition, diagnostic tests and medications  Description: Target End Date:  1-3 days or as soon as patient condition allowsDocument in Patient Education1.  Patient and family/caregiver oriented to unit, equipment, visitation policy and means for communicating concern2.  Complete/review Learning Assessment3.  Assess knowledge level of disease process/condition, treatment plan, diagnostic tests and medications4.  Explain disease process/condition, treatment plan, diagnostic tests and medications  Outcome: Progressing     Problem: Fall Risk  Goal: Patient will remain free from falls  Description: Target End Date:  Prior to discharge or change in level of careDocument interventions on the [employer] [facility] Fall Risk Assessment1.  Assess for fall risk factors2.   Implement fall precautions  Outcome: Progressing       Patient is not progressing towards the following goals:

## 2025-04-12 NOTE — ASSESSMENT & PLAN NOTE
Orthostatics positive 4/11.   -IV NS Bolus 1L. Repeat orthostatics and bolus 500 if still elevated.  -Abdominal binder  -Compression stockings

## 2025-04-12 NOTE — DISCHARGE PLANNING
Case Management Discharge Planning    Admission Date: 4/9/2025  GMLOS: 2.8  ALOS: 3    6-Clicks ADL Score: 24  6-Clicks Mobility Score: 18      Anticipated Discharge Dispo: Discharge Disposition: D/T to home under HHA care in anticipation of covered skilled care (06)    DME Needed: Pending hospital course     Action(s) Taken:LMSW spoke with pt to receive HH choice LMSW received choice for [facility]. LMSW faxed choice form to [facility].

## 2025-04-12 NOTE — FACE TO FACE
Face to Face Supporting Documentation - Home Health    The encounter with this patient was in whole or in part the primary reason for home health admission.    Date of encounter:   Patient:                    MRN:                       YOB: 2025  Fausto Martin  [id number]  1950     Home health to see patient for:  Physical Therapy evaluation and treatment and Occupational therapy evaluation and treatment    Skilled need for:  Comment: Deconditioning    Skilled nursing interventions to include:  Comment:      Homebound status evidenced by:  Need the aid of supportive devices such as crutches, canes, wheelchairs or walkers. Leaving home requires a considerable and taxing effort. There is a normal inability to leave the home.    Community Physician to provide follow up care: ANTHONY MCCLURE M.D.     Optional Interventions? No      I certify the face to face encounter for this home health care referral meets the CMS requirements and the encounter/clinical assessment with the patient was, in whole, or in part, for the medical condition(s) listed above, which is the primary reason for home health care. Based on my clinical findings: the service(s) are medically necessary, support the need for home health care, and the homebound criteria are met.  I certify that this patient has had a face to face encounter by myself.  Mitch Guzman M.D. - NPI: [id number]

## 2025-04-12 NOTE — PROGRESS NOTES
[facility] Internal Medicine Daily Progress Note    Date of Service  4/11/2025    R Team: R IM [facility] Team   Attending: Alyssa Gonzalez M.d.  Senior Resident: Dr. Veliz  Intern:  Dr. Guzman  Contact Number: [phone]    Chief Complaint  Fausto Martin is a 75 y.o. male admitted 4/9/2025 with elizabeth and uti.    Hospital Course  76 yo male with pmh of chronic constipation presenting 4/9/25 with lower abdominal pain and painful dark urination that had worsened in the days leading up to admission. In ED was noted to have UA with positive nitrites, moderate LE, 6-10 wbc/hpf, few bacteria and calcium oxalate crystals. CT abdomen noted bilateral non-obstructive nephrolithiasis. He did report Consuming 10-20 Tums per day for GERD. Also notedto have ELIZABETH w/ Cr of 1.7 with baseline 1.0. Admitted for management of ELIZABETH and UTI.        Interval Problem Update  -Unasyn continued  -Orthostatics positive. 1L NS bolus  -Nausea improved.  -PT recommending HH PT.  -Cr improving    I have discussed this patient's plan of care and discharge plan at IDT rounds today with Case Management, Nursing, Nursing leadership, and other members of the IDT team.    Consultants/Specialty  NA    Code Status  Full Code    Disposition  The patient is not medically cleared for discharge to home or a post-acute facility.      I have placed the appropriate orders for post-discharge needs.    Review of Systems  Review of Systems   Constitutional:  Negative for chills and fever.   Respiratory:  Negative for cough, hemoptysis and shortness of breath.    Cardiovascular:  Negative for chest pain, palpitations and leg swelling.   Gastrointestinal:  Positive for abdominal pain (Low abdomen) and constipation. Negative for blood in stool, diarrhea and melena.   Genitourinary:  Positive for dysuria and frequency. Negative for flank pain, hematuria and urgency.   Musculoskeletal:  Positive for back pain. Negative for myalgias.   Neurological: Negative.         Physical  Exam  Temp:  [35.9 °C (96.6 °F)-36.1 °C (97 °F)] 36.1 °C (97 °F)  Pulse:  [74-85] 85  Resp:  [17-20] 17  BP: (112-149)/(70-98) 142/85  SpO2:  [90 %-92 %] 91 %    Physical Exam  Constitutional:       Appearance: He is not ill-appearing or diaphoretic.      Comments: Poor hygiene and dentition.   HENT:      Head: Normocephalic and atraumatic.   Eyes:      Extraocular Movements: Extraocular movements intact.      Pupils: Pupils are equal, round, and reactive to light.   Cardiovascular:      Rate and Rhythm: Normal rate and regular rhythm.      Pulses: Normal pulses.      Heart sounds: Normal heart sounds.   Pulmonary:      Effort: Pulmonary effort is normal.      Breath sounds: Normal breath sounds.   Abdominal:      General: There is no distension.      Tenderness: There is abdominal tenderness (suprapubic tenderness). There is no right CVA tenderness, left CVA tenderness, guarding or rebound.   Musculoskeletal:         General: No swelling.   Skin:     General: Skin is warm and dry.   Neurological:      Mental Status: He is alert and oriented to person, place, and time.      Comments: Speech slowed, unsure if this is his baseline.         Fluids    Intake/Output Summary (Last 24 hours) at 4/11/2025 1703  Last data filed at 4/11/2025 1600  Gross per 24 hour   Intake 340 ml   Output 2650 ml   Net -2310 ml       Laboratory  Recent Labs     04/09/25  1527 04/10/25  0300 04/11/25  0031   WBC 8.2 7.1 6.4   RBC 4.89 4.12* 3.96*   HEMOGLOBIN 15.8 13.2* 12.6*   HEMATOCRIT 45.9 38.3* 37.0*   MCV 93.9 93.0 93.4   MCH 32.3 32.0 31.8   MCHC 34.4 34.5 34.1   RDW 44.4 43.8 43.3   PLATELETCT 190 145* 129*   MPV 10.7 10.8 11.2     Recent Labs     04/09/25  1527 04/10/25  0300 04/11/25  0031   SODIUM 142 141 133*   POTASSIUM 4.0 4.7 3.9   CHLORIDE 106 109 106   CO2 23 23 19*   GLUCOSE 132* 98 110*   BUN 25* 27* 19   CREATININE 1.70* 1.51* 1.44*   CALCIUM 10.0 8.5 7.8*                   Imaging  CT-ABDOMEN-PELVIS W/O   Final Result          1. Bilateral nephrolithiasis.   2. Hepatic and renal cysts.   3. Atherosclerosis with ectasia of the abdominal aorta.   4. Mild diverticulosis.           Assessment/Plan  Problem Representation:    * UTI (urinary tract infection)- (present on admission)  Assessment & Plan  Presenting with worsening lower abdominal/suprapubic pain  UA positive for bacteria, leukocyte esterase, nitrates  No pyelo on CT, although demonstrating bilateral nephrolithiasis along with renal cysts  Denies recent antibiotic use    Transitioned to Augmentin given past urine cultures growing E. Faecalis.  Investigate possible contribution of urinary retention and nephrolithiasis as below    Physical deconditioning- (present on admission)  Assessment & Plan  Slow, difficult movement on observation. Concern for ability to care for self at home.  -PT/OT    Hepatic cyst  Assessment & Plan  Noted on prior imaging, seen on admission CT abdomen pelvis    Aortic ectasia, abdominal (HCC)  Assessment & Plan  CT AP incidental finding of ectasia abdominal aorta 2.9 cm  No acute intervention    Renal cyst  Assessment & Plan  Noted on CT abdomen pelvis  Right cystic lesions  Small hypodense lesion in posterior left kidney measuring 7 mm    ELIZABETH (acute kidney injury) (HCC)- (present on admission)  Assessment & Plan  Creatinine 1.7 from baseline 1  CT abdomen with nephrolithiasis but no obstruction or hydronephrosis  Hyaline casts on UA  Could be prerenal from dehydration, versus postrenal from obstruction  Denies NSAID use    Improved to 1.4  BLADDER SCAN ORDERED.   Fluids Dc'd.  Trend on CMP  Avoid nephrotoxins, renally dose meds  Hold home lisinopril, use heparin for DVT PPx    Lower abdominal pain- (present on admission)  Assessment & Plan  Acute on chronic  Likely chronic from constipation, combined with now acute component of UTI  CT abdomen pelvis unremarkable, demonstrates diverticulosis without diverticulitis, not that constipated on  it    Addressed UTI as above  As needed and scheduled Tylenol  Opioids for breakthrough pain, will start low-dose to avoid future constipation  If renal function recovers consider using NSAIDs instead of opioids    Hyperlipidemia- (present on admission)  Assessment & Plan  Continue home atorvastatin    Joint pain  Assessment & Plan  Chronic  Continue home gabapentin  Watch gabapentin dosing with change in renal function    BPH (benign prostatic hyperplasia)- (present on admission)  Assessment & Plan  Previously was using Mccabe for retention, until getting TURP 2024  Since then states he had no problems with urination while on his tamsulosin  States current difficulty urinating but could be due to pain from UTI, vs retention again  Constipation can also contribute to retention    Bladder scan  Continue home tamsulosin  If retaining consider cath/Mccabe  Address constipation as above    Constipation- (present on admission)  Assessment & Plan  Chronic, states he takes Linzess but still has 1 bowel movement every 3 to 4 days    Continue home Linzess  Hydration as above  Bowel regiment  Address borderline hypercalcemia as above    Nephrolithiasis- (present on admission)  Assessment & Plan  CT abdomen with bilateral nephrolithiasis  UA with calcium oxalate crystals  Risk factors from borderline elevated calcium of 10, also dehydrated  Patient states he takes 10-20 Tums every day for GERD    Encouraged oral fluid intake.  Advised on cessation of overuse of Tums    GERD (gastroesophageal reflux disease)- (present on admission)  Assessment & Plan  Uncontrolled  Patient mentioned he takes 10-20 Tums daily  Due to possible contribution to borderline high calcium level and nephrolithiasis, will hold Tums    Trial of GI cocktail  Start omeprazole 20  Recommended decreasing tums intake. May need to follow with GI outpatient for possible EGD given severe GERD.    Hypertension- (present on admission)  Assessment & Plan  History  of  Soft BP in ED with SBP 100s 110s    Hold home amlodipine and lisinopril for now due to risk of sepsis in future  Hold home lisinopril for ELIZABETH         VTE prophylaxis: heparin ppx    I have performed a physical exam and reviewed and updated ROS and Plan today (4/11/2025). In review of yesterday's note (4/10/2025), there are no changes except as documented above.

## 2025-04-12 NOTE — PROGRESS NOTES
Patient ready for DCL. Has used the restroom, transport ready, notified of DC medications for home. Awaiting transport for DCL.

## 2025-04-12 NOTE — CARE PLAN
The patient is Stable - Low risk of patient condition declining or worsening    Shift Goals  Clinical Goals: pain management throughout shift  Patient Goals: rest, decrease pain  Family Goals: RADHA    Progress made toward(s) clinical / shift goals:  pt reports lower back pain, medicated with oxycodone and tylenol.    Problem: Pain - Standard  Goal: Alleviation of pain or a reduction in pain to the patient’s comfort goal  Outcome: Progressing     Problem: Knowledge Deficit - Standard  Goal: Patient and family/care givers will demonstrate understanding of plan of care, disease process/condition, diagnostic tests and medications  Outcome: Progressing     Problem: Fall Risk  Goal: Patient will remain free from falls  Outcome: Progressing       Patient is not progressing towards the following goals:

## 2025-04-12 NOTE — DISCHARGE PLANNING
[facility] Transitional Care Coordination    Referral from: Dr. Veliz    Insurance Provider on Facesheet: MCR-B/JEFF FFS. PAR to look into benefits.    Potential Rehab Diagnosis: Debility    Chart review indicates patient may have on going medical management and may have therapy needs to possibly meet inpatient rehab facility criteria with the goal of returning to community.    D/C support will need to be verified: Friend    Physiatry consultation denied per protocol.  Fausto is not requiring 2 of 3 disciplines. TCC will no longer follow.  Please reach out to myself with any questions.      Thank you for the referral.

## 2025-04-13 NOTE — DISCHARGE SUMMARY
[facility] Internal Medicine Discharge Summary    Attending: Dr. Gonzalez  Senior Resident: Dr. Veliz  Intern:  Dr. Guzman  Contact Number: [phone]    CHIEF COMPLAINT ON ADMISSION  Chief Complaint   Patient presents with    Abdominal Pain     Lower abdominal pain x2 weeks, worsening today. 10/10 sharp stabbing pain. -n/v/d. Last BM this morning.     Painful Urination     X1 week, unsure if there is an odor. +dark urine       Reason for Admission  Abd Pain     Admission Date  4/9/2025    CODE STATUS  Full Code    HPI & HOSPITAL COURSE  74 yo male with pmh of chronic constipation presenting 4/9/25 with lower abdominal pain and painful dark urination that had worsened in the days leading up to admission. In ED was noted to have UA with positive nitrites, moderate LE, 6-10 wbc/hpf, few bacteria and calcium oxalate crystals. CT abdomen noted bilateral non-obstructive nephrolithiasis. He did report Consuming 10-20 Tums per day for GERD. Also notedto have ELIZABETH w/ Cr of 1.7 with baseline 1.0. Admitted for management of ELIZABETH and UTI.    Managed initially with ceftriaxone and aggressive fluid repletion, elizabeth significantly improved. Ceftraixone was changed to Unasyn given past urine cultures growing E faecalis. He was noted to have orthostatic hypotension during admission with minimal improvement following 1.5L IVF bolus, however he had overall improvement in symptoms and function. By day of discharge urine cultures continued to be negative and the decision was made to stop antibiotics. He was informed to seek medical attention if he developed worsening urinary tract symptoms, fever, worsening cva tenderness. He was also instructed to significantly reduce tums intake, discharged with omeprazole and GI referral for severe GERD.    Therefore, he is discharged in fair and stable condition to home with organized home healthcare and close outpatient follow-up.    The patient met 2-midnight criteria for an inpatient stay at the time of  discharge.    Discharge Date  4/12/2025    Physical Exam on Day of Discharge  Physical Exam  Neurological:      Mental Status: He is oriented to person, place, and time.      Motor: Weakness (BLE 4+/5.) present.      Comments: Action tremor bilateral hands. Flat affect. No cogwheel rigidity.         FOLLOW UP ITEMS POST DISCHARGE  Follow up with PCP and HH PT/OT.    DISCHARGE DIAGNOSES  Principal Problem:    UTI (urinary tract infection) (POA: Yes)  Active Problems:    Hypertension (POA: Yes)    GERD (gastroesophageal reflux disease) (POA: Yes)    Nephrolithiasis (POA: Yes)    Constipation (POA: Yes)    BPH (benign prostatic hyperplasia) (POA: Yes)    Joint pain (POA: Unknown)    Hyperlipidemia (POA: Yes)    Lower abdominal pain (POA: Yes)    ELIZABETH (acute kidney injury) (HCC) (POA: Yes)    Renal cyst (POA: Unknown)    Aortic ectasia, abdominal (HCC) (POA: Unknown)    Hepatic cyst (POA: Unknown)    Physical deconditioning (POA: Yes)    Orthostatic hypotension (POA: Unknown)  Resolved Problems:    * No resolved hospital problems. *      FOLLOW UP  No future appointments.  Charmaine Fink M.D.  [address] [facility]  [place] [address]  [phone]    Schedule an appointment as soon as possible for a visit in 1 week(s)        MEDICATIONS ON DISCHARGE     Medication List        START taking these medications        Instructions   baclofen 5 MG Tabs  Commonly known as: Lioresal   Take 1 Tablet by mouth 2 times a day for 30 days.  Dose: 5 mg     omeprazole 20 MG delayed-release capsule  Start taking on: April 13, 2025  Commonly known as: PriLOSEC   Take 1 Capsule by mouth every day.  Dose: 20 mg     oxyCODONE immediate-release 5 MG Tabs  Commonly known as: Roxicodone   Take 1 Tablet by mouth 2 times a day as needed for Severe Pain for up to 5 days.  Dose: 5 mg     vitamin D2 (Ergocalciferol) 1.25 MG (39812 UT) Caps capsule  Commonly known as: Drisdol   Take 1 Capsule by mouth every 7 days.  Dose: 50,000 Units            CHANGE  how you take these medications        Instructions   Linzess 145 MCG Caps  What changed:   how much to take  additional instructions  Generic drug: linaCLOtide   Take 2 capsules by mouth every 3 to 4 days as needed.  Dose: 290 mcg            CONTINUE taking these medications        Instructions   amLODIPine 2.5 MG Tabs  Commonly known as: Norvasc   Take 2.5 mg by mouth every day.  Dose: 2.5 mg     atorvastatin 20 MG Tabs  Commonly known as: Lipitor   Take 20 mg by mouth every day.  Dose: 20 mg     gabapentin 100 MG Caps  Commonly known as: Neurontin   Take 300-500 mg by mouth 1 time a day as needed (pain). 3 capsules =300 mg  5 capsules = 500 mg  Dose: 300-500 mg     tamsulosin 0.4 MG capsule  Commonly known as: Flomax   Take 1 Capsule by mouth 1/2 hour after breakfast.  Dose: 0.4 mg            STOP taking these medications      lisinopril 40 MG tablet  Commonly known as: Prinivil              Allergies  No Known Allergies    DIET  No orders of the defined types were placed in this encounter.      ACTIVITY  As tolerated.  Weight bearing as tolerated    CONSULTATIONS  None    PROCEDURES  None    LABORATORY  Lab Results   Component Value Date    SODIUM 138 04/12/2025    POTASSIUM 4.3 04/12/2025    CHLORIDE 109 04/12/2025    CO2 21 04/12/2025    GLUCOSE 104 (H) 04/12/2025    BUN 15 04/12/2025    CREATININE 1.21 04/12/2025        Lab Results   Component Value Date    WBC 6.6 04/12/2025    HEMOGLOBIN 12.8 (L) 04/12/2025    HEMATOCRIT 36.8 (L) 04/12/2025    PLATELETCT 130 (L) 04/12/2025        Total time of the discharge process exceeds 42 minutes.

## 2025-04-18 NOTE — Clinical Note
REFERRAL APPROVAL NOTICE         Sent on April 18, 2025                   Fausto Martin  [address] [facility] [address] [place] [id number]                   Dear Mr. Martin,    After a careful review of the medical information and benefit coverage, [facility] has processed your referral. See below for additional details.    If applicable, you must be actively enrolled with your insurance for coverage of the authorized service. If you have any questions regarding your coverage, please contact your insurance directly.    REFERRAL INFORMATION   Referral #:  [id number]  Referred-To Provider    Referred-By Provider:  Gastroenterology    Alyssa Gonzalez M.D.   [facility]      [address] [facility] [id number] [phone] [address] [facility] [address]  [phone]    Referral Start Date:  04/12/2025  Referral End Date:   04/12/2026             SCHEDULING  If you do not already have an appointment, please call [phone] to make an appointment.     MORE INFORMATION  If you do not already have a [employer] account, sign up at: [email or web address]  You can access your medical information, make appointments, see lab results, billing information, and more.  If you have questions regarding this referral, please contact  the [facility] Referrals department at:             [phone]. Monday - Friday 8:00AM - 5:00PM.     Sincerely,    [facility]

## 2025-05-23 ENCOUNTER — HOSPITAL ENCOUNTER (EMERGENCY)
Facility: MEDICAL CENTER | Age: 75
End: 2025-05-23
Attending: EMERGENCY MEDICINE
Payer: MEDICARE

## 2025-05-23 ENCOUNTER — PHARMACY VISIT (OUTPATIENT)
Dept: PHARMACY | Facility: MEDICAL CENTER | Age: 75
End: 2025-05-23
Payer: COMMERCIAL

## 2025-05-23 VITALS
DIASTOLIC BLOOD PRESSURE: 63 MMHG | WEIGHT: 213.63 LBS | SYSTOLIC BLOOD PRESSURE: 98 MMHG | BODY MASS INDEX: 28.93 KG/M2 | TEMPERATURE: 98.2 F | HEART RATE: 71 BPM | RESPIRATION RATE: 18 BRPM | HEIGHT: 72 IN | OXYGEN SATURATION: 93 %

## 2025-05-23 DIAGNOSIS — K21.9 GASTROESOPHAGEAL REFLUX DISEASE, UNSPECIFIED WHETHER ESOPHAGITIS PRESENT: ICD-10-CM

## 2025-05-23 DIAGNOSIS — Z71.1 FEARED CONDITION NOT DEMONSTRATED: Primary | ICD-10-CM

## 2025-05-23 LAB
ABO GROUP BLD: NORMAL
ALBUMIN SERPL BCP-MCNC: 3.8 G/DL (ref 3.2–4.9)
ALBUMIN/GLOB SERPL: 1.3 G/DL
ALP SERPL-CCNC: 76 U/L (ref 30–99)
ALT SERPL-CCNC: 30 U/L (ref 2–50)
ANION GAP SERPL CALC-SCNC: 12 MMOL/L (ref 7–16)
APTT PPP: 28.9 SEC (ref 24.7–36)
AST SERPL-CCNC: 34 U/L (ref 12–45)
BASOPHILS # BLD AUTO: 0.6 % (ref 0–1.8)
BASOPHILS # BLD: 0.05 K/UL (ref 0–0.12)
BILIRUB SERPL-MCNC: 0.9 MG/DL (ref 0.1–1.5)
BLD GP AB SCN SERPL QL: NORMAL
BUN SERPL-MCNC: 24 MG/DL (ref 8–22)
CALCIUM ALBUM COR SERPL-MCNC: 9.9 MG/DL (ref 8.5–10.5)
CALCIUM SERPL-MCNC: 9.7 MG/DL (ref 8.5–10.5)
CHLORIDE SERPL-SCNC: 108 MMOL/L (ref 96–112)
CO2 SERPL-SCNC: 19 MMOL/L (ref 20–33)
CREAT SERPL-MCNC: 1.4 MG/DL (ref 0.5–1.4)
EKG IMPRESSION: NORMAL
EOSINOPHIL # BLD AUTO: 0.2 K/UL (ref 0–0.51)
EOSINOPHIL NFR BLD: 2.4 % (ref 0–6.9)
ERYTHROCYTE [DISTWIDTH] IN BLOOD BY AUTOMATED COUNT: 43.6 FL (ref 35.9–50)
GFR SERPLBLD CREATININE-BSD FMLA CKD-EPI: 52 ML/MIN/1.73 M 2
GLOBULIN SER CALC-MCNC: 2.9 G/DL (ref 1.9–3.5)
GLUCOSE SERPL-MCNC: 104 MG/DL (ref 65–99)
HCT VFR BLD AUTO: 44.9 % (ref 42–52)
HEMOCCULT STL QL: NEGATIVE
HGB BLD-MCNC: 15.5 G/DL (ref 14–18)
IMM GRANULOCYTES # BLD AUTO: 0.03 K/UL (ref 0–0.11)
IMM GRANULOCYTES NFR BLD AUTO: 0.4 % (ref 0–0.9)
INR PPP: 1.15 (ref 0.87–1.13)
LIPASE SERPL-CCNC: 25 U/L (ref 11–82)
LYMPHOCYTES # BLD AUTO: 0.98 K/UL (ref 1–4.8)
LYMPHOCYTES NFR BLD: 11.6 % (ref 22–41)
MCH RBC QN AUTO: 31.6 PG (ref 27–33)
MCHC RBC AUTO-ENTMCNC: 34.5 G/DL (ref 32.3–36.5)
MCV RBC AUTO: 91.6 FL (ref 81.4–97.8)
MONOCYTES # BLD AUTO: 0.68 K/UL (ref 0–0.85)
MONOCYTES NFR BLD AUTO: 8 % (ref 0–13.4)
NEUTROPHILS # BLD AUTO: 6.53 K/UL (ref 1.82–7.42)
NEUTROPHILS NFR BLD: 77 % (ref 44–72)
NRBC # BLD AUTO: 0 K/UL
NRBC BLD-RTO: 0 /100 WBC (ref 0–0.2)
PLATELET # BLD AUTO: 187 K/UL (ref 164–446)
PMV BLD AUTO: 11 FL (ref 9–12.9)
POTASSIUM SERPL-SCNC: 4 MMOL/L (ref 3.6–5.5)
PROT SERPL-MCNC: 6.7 G/DL (ref 6–8.2)
PROTHROMBIN TIME: 14.7 SEC (ref 12–14.6)
RBC # BLD AUTO: 4.9 M/UL (ref 4.7–6.1)
RH BLD: NORMAL
SODIUM SERPL-SCNC: 139 MMOL/L (ref 135–145)
WBC # BLD AUTO: 8.5 K/UL (ref 4.8–10.8)

## 2025-05-23 PROCEDURE — 85610 PROTHROMBIN TIME: CPT

## 2025-05-23 PROCEDURE — 93005 ELECTROCARDIOGRAM TRACING: CPT | Mod: TC

## 2025-05-23 PROCEDURE — RXMED WILLOW AMBULATORY MEDICATION CHARGE: Performed by: EMERGENCY MEDICINE

## 2025-05-23 PROCEDURE — 36415 COLL VENOUS BLD VENIPUNCTURE: CPT

## 2025-05-23 PROCEDURE — 86900 BLOOD TYPING SEROLOGIC ABO: CPT

## 2025-05-23 PROCEDURE — 80053 COMPREHEN METABOLIC PANEL: CPT

## 2025-05-23 PROCEDURE — 86850 RBC ANTIBODY SCREEN: CPT

## 2025-05-23 PROCEDURE — 99284 EMERGENCY DEPT VISIT MOD MDM: CPT

## 2025-05-23 PROCEDURE — 93005 ELECTROCARDIOGRAM TRACING: CPT | Mod: TC | Performed by: EMERGENCY MEDICINE

## 2025-05-23 PROCEDURE — 85025 COMPLETE CBC W/AUTO DIFF WBC: CPT

## 2025-05-23 PROCEDURE — 82272 OCCULT BLD FECES 1-3 TESTS: CPT

## 2025-05-23 PROCEDURE — 83690 ASSAY OF LIPASE: CPT

## 2025-05-23 PROCEDURE — 85730 THROMBOPLASTIN TIME PARTIAL: CPT

## 2025-05-23 PROCEDURE — 86901 BLOOD TYPING SEROLOGIC RH(D): CPT

## 2025-05-23 RX ORDER — FAMOTIDINE 40 MG/1
40 TABLET, FILM COATED ORAL DAILY
Qty: 30 TABLET | Refills: 0 | Status: SHIPPED | OUTPATIENT
Start: 2025-05-23

## 2025-05-23 ASSESSMENT — FIBROSIS 4 INDEX: FIB4 SCORE: 3.12

## 2025-05-23 NOTE — DISCHARGE INSTRUCTIONS
The cause of your dark stools appears to be from the Pepto-Bismol you have been taking.  I recommend stopping this medication, I will place you on a medication for your reflux.

## 2025-05-23 NOTE — ED TRIAGE NOTES
"Fausto Martin  75 y.o.  male  Chief Complaint   Patient presents with    Bloody Stools     Pt c/o black tarry stools & LLQ pain x 3 weeks & fatigued. Pt not on thinners. Also c/o \"acid reflux\".  Pt denies any syncope but + lightheaded.      Pt reports tarry stools have increased a lot the past few days. GI bleed protocol ordered. Patient educated on triage process, to alert staff if any changes in condition.  "

## 2025-05-23 NOTE — ED PROVIDER NOTES
"ED Provider Note    Primary care provider: ANTHONY MCCLURE M.D.    CHIEF COMPLAINT  Chief Complaint   Patient presents with    Bloody Stools     Pt c/o black tarry stools & LLQ pain x 3 weeks & fatigued. Pt not on thinners. Also c/o \"acid reflux\".  Pt denies any syncope but + lightheaded.          Limitation to History:  Appears to be a suboptimal historian    HPI  Fausto Martin is a 75 y.o. male who presents to the Emergency Department with several weeks dark tarry stools.  The patient does take ibuprofen once daily, he also has chronic acid reflux for which he takes Tums and Pepto-Bismol.  Feels occasionally fatigued.  Does not take any aspirin, Plavix, DOAC, Coumadin etc.  Unaware of fevers or chills.      External Record Review: Patient was admitted here this April, was here for 72 hours in the setting of ELIZABETH and urinary tract infection.  Hemoglobin was checked several times throughout his stay and average around 12.8.  Patient had CT done last month that showed some mild diverticulosis.    PAST MEDICAL HISTORY   has a past medical history of High cholesterol, Hypertension, and Neuropathy.    SURGICAL HISTORY  patient denies any surgical history    SOCIAL HISTORY  Social History[1]   Social History     Substance and Sexual Activity   Drug Use Yes    Types: Inhaled    Comment: xiang       PHYSICAL EXAM  VITAL SIGNS: BP 98/63   Pulse 71   Temp 36.8 °C (98.2 °F) (Temporal)   Resp 18   Ht 1.829 m (6')   Wt 96.9 kg (213 lb 10 oz)   SpO2 93%   BMI 28.97 kg/m²   Constitutional: Awake, alert in no apparent distress.  HENT: Normocephalic, Bilateral external ears normal. Nose normal.   Eyes: Conjunctiva normal, non-icteric, EOMI.    Thorax & Lungs: Easy unlabored respirations, Clear to ascultation bilaterally.  Cardiovascular: Regular rate, Regular rhythm, No murmurs, rubs or gallops. Bilateral pulses symmetrical.   Abdomen:  Soft, nontender, nondistended, normal active bowel sounds.  Rectal shows dark " stool, does not appear tarry.  :    Skin: Visualized skin is  Dry, No erythema, No rash.   Musculoskeletal:   No cyanosis, clubbing or edema. No leg asymmetry.   Neurologic: Alert, Grossly non-focal.   Psychiatric: Normal affect, Normal mood  Lymphatic:      EKG   12 lead Interpreted by me  Rhythm:  Normal sinus rhythm   Rate: 80  Axis: normal  Ectopy: none  Conduction: normal  ST Segments: no acute change flat ST segments, unchanged  T Waves: no acute change  Clinical Impression: Unremarkable EKG without acute changes       COURSE & MEDICAL DECISION MAKING  Pertinent Labs & Imaging studies reviewed. (See chart for details)    COURSE & MEDICAL DECISION MAKING  Pertinent Labs & Imaging studies reviewed. (See chart for details)    Differential diagnoses include but are not limited to: Melena, hematochezia, GI bleed, medication side effect.    11:49 AM - Nursing notes reviewed, patient seen and examined at bedside.    Escalation of care considered, and ultimately not performed: acute inpatient care management, however at this time, the patient is most appropriate for outpatient management.      Decision Making:  This is a pleasant 75 y.o. year old male who presents with esophageal reflux for years which she has been taking Tums regularly for.  He has had some dark stools for the past few days, admits to using Pepto-Bismol as well.  I did a fecal occult which is negative today, I suspect the dark stools is from the Pepto itself.  He is not tachycardic, not hypotensive.  Hemoglobin today is actually better than it was last month at 15.5, he was a little bit orthostatic but I suspect this is some hypovolemia, not related to any type of GI bleed.  Creatinine, BUN slightly over baseline.    As I do not suspect he is suffering from acute GI bleed I think he can be safely discharged, will start him on an H2 blocker and a PPI, recommend follow-up with GI.  Discontinue Pepto.     The patient was discharged home (see d/c  instructions) was told to return immediately for any signs or symptoms listed, or any worsening at all.  The patient verbally agreed to the discharge precautions and follow-up plan which is documented in EPIC.    Discharge Medications:  Discharge Medication List as of 5/23/2025  1:40 PM        START taking these medications    Details   famotidine (PEPCID) 40 MG Tab Take 1 Tablet by mouth every day., Disp-30 Tablet, R-0, Normal             FINAL IMPRESSION  1. Feared condition not demonstrated    2. Gastroesophageal reflux disease, unspecified whether esophagitis present                    [1]   Social History  Tobacco Use    Smoking status: Never    Smokeless tobacco: Never   Vaping Use    Vaping status: Never Used   Substance Use Topics    Alcohol use: Not Currently    Drug use: Yes     Types: Inhaled     Comment: xiang

## 2025-05-23 NOTE — ED NOTES
Pt ready for discharge, instructions given, verbalizes understanding  Medications provided to pt from pharmacy.   Pt taken to lobby via wheelchair.

## 2025-07-02 ENCOUNTER — APPOINTMENT (OUTPATIENT)
Dept: RADIOLOGY | Facility: MEDICAL CENTER | Age: 75
End: 2025-07-02
Attending: EMERGENCY MEDICINE
Payer: MEDICARE

## 2025-07-02 ENCOUNTER — HOSPITAL ENCOUNTER (EMERGENCY)
Facility: MEDICAL CENTER | Age: 75
End: 2025-07-02
Attending: EMERGENCY MEDICINE
Payer: MEDICARE

## 2025-07-02 ENCOUNTER — PHARMACY VISIT (OUTPATIENT)
Dept: PHARMACY | Facility: MEDICAL CENTER | Age: 75
End: 2025-07-02
Payer: COMMERCIAL

## 2025-07-02 VITALS
RESPIRATION RATE: 18 BRPM | HEART RATE: 72 BPM | OXYGEN SATURATION: 95 % | HEIGHT: 72 IN | BODY MASS INDEX: 28.37 KG/M2 | TEMPERATURE: 96.9 F | WEIGHT: 209.44 LBS | DIASTOLIC BLOOD PRESSURE: 93 MMHG | SYSTOLIC BLOOD PRESSURE: 168 MMHG

## 2025-07-02 DIAGNOSIS — N39.0 URINARY TRACT INFECTION WITHOUT HEMATURIA, SITE UNSPECIFIED: Primary | ICD-10-CM

## 2025-07-02 LAB
ALBUMIN SERPL BCP-MCNC: 4 G/DL (ref 3.2–4.9)
ALBUMIN/GLOB SERPL: 1.3 G/DL
ALP SERPL-CCNC: 83 U/L (ref 30–99)
ALT SERPL-CCNC: 30 U/L (ref 2–50)
ANION GAP SERPL CALC-SCNC: 12 MMOL/L (ref 7–16)
APPEARANCE UR: CLEAR
AST SERPL-CCNC: 29 U/L (ref 12–45)
BACTERIA #/AREA URNS HPF: ABNORMAL /HPF
BASOPHILS # BLD AUTO: 0.7 % (ref 0–1.8)
BASOPHILS # BLD: 0.06 K/UL (ref 0–0.12)
BILIRUB SERPL-MCNC: 1.8 MG/DL (ref 0.1–1.5)
BILIRUB UR QL STRIP.AUTO: NEGATIVE
BUN SERPL-MCNC: 22 MG/DL (ref 8–22)
CALCIUM ALBUM COR SERPL-MCNC: 9.4 MG/DL (ref 8.5–10.5)
CALCIUM SERPL-MCNC: 9.4 MG/DL (ref 8.5–10.5)
CASTS URNS QL MICRO: ABNORMAL /LPF (ref 0–2)
CHLORIDE SERPL-SCNC: 106 MMOL/L (ref 96–112)
CO2 SERPL-SCNC: 21 MMOL/L (ref 20–33)
COLOR UR: YELLOW
CREAT SERPL-MCNC: 1.22 MG/DL (ref 0.5–1.4)
EOSINOPHIL # BLD AUTO: 0.26 K/UL (ref 0–0.51)
EOSINOPHIL NFR BLD: 3.2 % (ref 0–6.9)
EPITHELIAL CELLS 1715: ABNORMAL /HPF (ref 0–5)
ERYTHROCYTE [DISTWIDTH] IN BLOOD BY AUTOMATED COUNT: 43.1 FL (ref 35.9–50)
GFR SERPLBLD CREATININE-BSD FMLA CKD-EPI: 62 ML/MIN/1.73 M 2
GLOBULIN SER CALC-MCNC: 3.1 G/DL (ref 1.9–3.5)
GLUCOSE SERPL-MCNC: 108 MG/DL (ref 65–99)
GLUCOSE UR STRIP.AUTO-MCNC: NEGATIVE MG/DL
HCT VFR BLD AUTO: 45.6 % (ref 42–52)
HGB BLD-MCNC: 16 G/DL (ref 14–18)
IMM GRANULOCYTES # BLD AUTO: 0.02 K/UL (ref 0–0.11)
IMM GRANULOCYTES NFR BLD AUTO: 0.2 % (ref 0–0.9)
KETONES UR STRIP.AUTO-MCNC: ABNORMAL MG/DL
LEUKOCYTE ESTERASE UR QL STRIP.AUTO: ABNORMAL
LIPASE SERPL-CCNC: 28 U/L (ref 11–82)
LYMPHOCYTES # BLD AUTO: 1.3 K/UL (ref 1–4.8)
LYMPHOCYTES NFR BLD: 16.2 % (ref 22–41)
MCH RBC QN AUTO: 31.9 PG (ref 27–33)
MCHC RBC AUTO-ENTMCNC: 35.1 G/DL (ref 32.3–36.5)
MCV RBC AUTO: 90.8 FL (ref 81.4–97.8)
MICRO URNS: ABNORMAL
MONOCYTES # BLD AUTO: 0.73 K/UL (ref 0–0.85)
MONOCYTES NFR BLD AUTO: 9.1 % (ref 0–13.4)
NEUTROPHILS # BLD AUTO: 5.65 K/UL (ref 1.82–7.42)
NEUTROPHILS NFR BLD: 70.6 % (ref 44–72)
NITRITE UR QL STRIP.AUTO: NEGATIVE
NRBC # BLD AUTO: 0 K/UL
NRBC BLD-RTO: 0 /100 WBC (ref 0–0.2)
PH UR STRIP.AUTO: 5 [PH] (ref 5–8)
PLATELET # BLD AUTO: 173 K/UL (ref 164–446)
PMV BLD AUTO: 11 FL (ref 9–12.9)
POTASSIUM SERPL-SCNC: 4.1 MMOL/L (ref 3.6–5.5)
PROT SERPL-MCNC: 7.1 G/DL (ref 6–8.2)
PROT UR QL STRIP: NEGATIVE MG/DL
RBC # BLD AUTO: 5.02 M/UL (ref 4.7–6.1)
RBC # URNS HPF: ABNORMAL /HPF (ref 0–2)
RBC UR QL AUTO: NEGATIVE
SODIUM SERPL-SCNC: 139 MMOL/L (ref 135–145)
SP GR UR STRIP.AUTO: 1.02
UROBILINOGEN UR STRIP.AUTO-MCNC: 0.2 EU/DL
WBC # BLD AUTO: 8 K/UL (ref 4.8–10.8)
WBC #/AREA URNS HPF: ABNORMAL /HPF

## 2025-07-02 PROCEDURE — 99285 EMERGENCY DEPT VISIT HI MDM: CPT

## 2025-07-02 PROCEDURE — 80053 COMPREHEN METABOLIC PANEL: CPT

## 2025-07-02 PROCEDURE — 700102 HCHG RX REV CODE 250 W/ 637 OVERRIDE(OP): Mod: UD | Performed by: EMERGENCY MEDICINE

## 2025-07-02 PROCEDURE — RXMED WILLOW AMBULATORY MEDICATION CHARGE: Performed by: EMERGENCY MEDICINE

## 2025-07-02 PROCEDURE — 83690 ASSAY OF LIPASE: CPT

## 2025-07-02 PROCEDURE — A9270 NON-COVERED ITEM OR SERVICE: HCPCS | Mod: UD | Performed by: EMERGENCY MEDICINE

## 2025-07-02 PROCEDURE — 700105 HCHG RX REV CODE 258: Mod: UD | Performed by: EMERGENCY MEDICINE

## 2025-07-02 PROCEDURE — 81001 URINALYSIS AUTO W/SCOPE: CPT

## 2025-07-02 PROCEDURE — 85025 COMPLETE CBC W/AUTO DIFF WBC: CPT

## 2025-07-02 PROCEDURE — 76775 US EXAM ABDO BACK WALL LIM: CPT

## 2025-07-02 PROCEDURE — 87086 URINE CULTURE/COLONY COUNT: CPT

## 2025-07-02 PROCEDURE — 36415 COLL VENOUS BLD VENIPUNCTURE: CPT

## 2025-07-02 RX ORDER — ACETAMINOPHEN 500 MG
1000 TABLET ORAL ONCE
Status: COMPLETED | OUTPATIENT
Start: 2025-07-02 | End: 2025-07-02

## 2025-07-02 RX ORDER — SODIUM CHLORIDE 9 MG/ML
1000 INJECTION, SOLUTION INTRAVENOUS ONCE
Status: COMPLETED | OUTPATIENT
Start: 2025-07-02 | End: 2025-07-02

## 2025-07-02 RX ORDER — SULFAMETHOXAZOLE AND TRIMETHOPRIM 800; 160 MG/1; MG/1
1 TABLET ORAL 2 TIMES DAILY
Qty: 14 TABLET | Refills: 0 | Status: ACTIVE | OUTPATIENT
Start: 2025-07-02 | End: 2025-07-08

## 2025-07-02 RX ADMIN — ACETAMINOPHEN 1000 MG: 500 TABLET ORAL at 09:42

## 2025-07-02 RX ADMIN — SODIUM CHLORIDE 1000 ML: 9 INJECTION, SOLUTION INTRAVENOUS at 07:45

## 2025-07-02 ASSESSMENT — PAIN DESCRIPTION - PAIN TYPE
TYPE: ACUTE PAIN

## 2025-07-02 ASSESSMENT — FIBROSIS 4 INDEX: FIB4 SCORE: 2.49

## 2025-07-02 NOTE — ED NOTES
Pt to GRN28.  Pt ambulated to room.  Pt in gown and placed on monitors.  Agree with triage note.   Assessment complete.  ERP to see. Provided with urinal for urinary sample.

## 2025-07-02 NOTE — ED TRIAGE NOTES
Chief Complaint   Patient presents with    Painful Urination     Pt BIBA from home for frequency and dysuria. Pt has hx of frequent UTIs, reports similar symptoms. Pain 8/10. -n/v +abd pain     Pt was last on abx for UTI x2 months ago.  Abd pain protocol ordered, pt placed in phleb chairs.     Pt is alert and oriented, speaking in full sentences, follows commands and responds appropriately to questions. Resperations are even and unlabored.       BP (!) 150/100   Pulse 75   Temp 36.3 °C (97.4 °F) (Temporal)   Resp 16   Ht 1.829 m (6')   Wt 95 kg (209 lb 7 oz)   SpO2 93%

## 2025-07-02 NOTE — ED NOTES
Discussed discharge paperwork with patient. No further questions at this time. Removed PIV. Patient agreeable to discharge plan and pending meds to beds delivery.

## 2025-07-02 NOTE — ED PROVIDER NOTES
Emergency Physician Note    Chief Concern:  Chief Complaint   Patient presents with    Painful Urination     Pt BIBA from home for frequency and dysuria. Pt has hx of frequent UTIs, reports similar symptoms. Pain 8/10. -n/v +abd pain         External Records Reviewed:  Inpatient records reviewed: Internal medicine physician discharge summary reviewed from 4/12/2025.  Patient presented with lower abdominal pain and painful urination.  Noted to have urinalysis with positive nitrites, and few bacteria.  He was treated with ceftriaxone and fluid resuscitation, ceftriaxone changed to Unasyn given past urine cultures growing Enterococcus faecalis.  Urine cultures were negative by day of discharge, and antibiotics were discontinued.    HPI/ROS     HPI:  Fausto Martin is a 75 y.o. male who presents to the emergency department today for evaluation of lower abdominal pain and dysuria.  Symptoms began about a week ago, he describes a sensation of pressure and discomfort in the lower abdomen in the area of the bladder, he also reports some pain with urination.  His past medical history significant for recurrent urinary tract infections, symptoms today are similar.  He reports no associated fevers, no flank pain, no upper abdominal pain, no nausea or vomiting.  No reliable exacerbating or alleviating factors are identified.  He does not report any bowel or bladder dysfunction, no low back pain.    PAST MEDICAL HISTORY  Past Medical History[1]    SURGICAL HISTORY  Past Surgical History[2]    FAMILY HISTORY  History reviewed. No pertinent family history.    SOCIAL HISTORY   reports that he has never smoked. He has never used smokeless tobacco. He reports that he does not currently use alcohol. He reports current drug use. Drug: Inhaled.    CURRENT MEDICATIONS  Discharge Medication List as of 7/2/2025 10:28 AM        CONTINUE these medications which have NOT CHANGED    Details   famotidine (PEPCID) 40 MG Tab Take 1 Tablet by  mouth every day., Disp-30 Tablet, R-0, Normal      omeprazole (PRILOSEC) 20 MG delayed-release capsule Take 1 Capsule by mouth every day., Disp-30 Capsule, R-0, Normal      vitamin D2, Ergocalciferol, (DRISDOL) 1.25 MG (33996 UT) Cap capsule Take 1 Capsule by mouth every 7 days., Disp-5 Capsule, R-0, Normal      linaCLOtide (LINZESS) 145 MCG Cap Take 2 capsules by mouth every 3 to 4 days as needed., Disp-30 Capsule, R-0, Normal      tamsulosin (FLOMAX) 0.4 MG capsule Take 1 Capsule by mouth 1/2 hour after breakfast., Disp-30 Capsule, R-5, Normal      gabapentin (NEURONTIN) 100 MG Cap Take 300-500 mg by mouth 1 time a day as needed (pain). 3 capsules =300 mg  5 capsules = 500 mg, Historical Med      amLODIPine (NORVASC) 2.5 MG Tab Take 2.5 mg by mouth every day., Historical Med      atorvastatin (LIPITOR) 20 MG Tab Take 20 mg by mouth every day., Historical Med             ALLERGIES  Patient has no known allergies.    PHYSICAL EXAM  Vital Signs: BP (!) 168/93   Pulse 72   Temp 36.1 °C (96.9 °F)   Resp 18   Ht 1.829 m (6')   Wt 95 kg (209 lb 7 oz)   SpO2 95%   BMI 28.40 kg/m²   Constitutional: Awake and alert, afebrile  HENT: Normocephalic, atraumatic.  Cardiovascular: No tachycardia, regular rate and rhythm  Pulmonary: No respiratory distress, normal work of breathing, breath sounds quite equal bilaterally  Abdomen: Soft, no peritoneal signs.  Bladder nonpalpable, very minimal discomfort on lower abdominal palpation without rebound or guarding.  Skin: Warm, dry, no rashes or lesions  Musculoskeletal: Normal range of motion in all extremities, no swelling or deformity noted  Neurologic: Alert, oriented, normal motor function, no speech deficits    Diagnostic Studies & Procedures    Labs:  All labs reviewed by me as noted below.    Radiology:  The attending Emergency Physician has independently interpreted the following imaging:  I independently reviewed the ultrasound images of the kidneys and bladder, bladder  is not markedly distended, renal cyst present.    US-RENAL   Final Result      1.  Right kidney mid pole simple cortical renal cyst measuring 1.99 x 2.09 x 2.34 cm.   2.  Left kidney mid pole eccentric cortical cyst measuring 1.154 x 0.974 x 0.995 cm. No clinical significance.   3.  No hydronephrosis or evidence of obstructive uropathy.          Course and Medical Decision Making    Initial Assessment and Plan:  Mr. Martin presents to the emergency department today for evaluation of lower abdominal pain and dysuria.  He has been seen in this emergency department previously for similar symptoms, has been treated frequently for urinary tract infections.  He has had both positive and negative cultures with similar presentations.  He does not report any associated fevers, no bowel or bladder dysfunction reported, no low back pain.  History is not suggestive of spinal cord injury or cauda equina.  Vital signs are less concerning for SIRS or sepsis.    Differential diagnosis includes cystitis, pyelonephritis, chronic abdominal pain, intra-abdominal infection, ureteral stone.    On laboratory evaluation urinalysis is positive for trace ketones and a few white blood cells, negative for bacteria.  CMP shows no significant abnormalities, creatinine and BUN are within normal limits.  Lipase is within normal limits.  He has a normal white blood count, again less concerning for infectious etiology.  Hemoglobin and platelet counts are normal.    He has had a recent CT that did not demonstrate any acute process, he does have history of nephrolithiasis.  Renal ultrasound was obtained, renal cysts are identified, though no hydronephrosis or evidence of obstructive uropathy.    Plan at this time is for initiation of antibiotics due to his symptoms.  Urine culture was sent, that result is pending at this time.  He will follow-up with primary care within 24 to 48 hours for complete recheck. Return precautions were discussed with the  patient, and provided in written form with the patient's discharge instructions.     Additional Problems and Disposition    Escalation of care considered, and ultimately not performed:  1.  Advanced imaging -CT imaging considered, lab work and physical examination are reassuring.  He has a CT scan from April that shows no acute process, do not believe repeat imaging is necessary at this time.    Prescription medication considerations and management:  1.  Bactrim prescription provided    Disposition:  Discharged in stable condition    FINAL IMPRESSION   1. Urinary tract infection without hematuria, site unspecified        FOLLOW UP:  Charmaine Fink M.D.  [address] [facility] [id number] [phone]    Call in 1 day      [facility], Emergency Dept  [address] [facility] [address] [phone]  Go to   If symptoms worsen      OUTPATIENT MEDICATIONS:  Discharge Medication List as of 7/2/2025 10:28 AM        START taking these medications    Details   sulfamethoxazole-trimethoprim (BACTRIM DS) 800-160 MG tablet Take 1 Tablet by mouth 2 times a day for 7 days., Disp-14 Tablet, R-0, Normal                  [1]   Past Medical History:  Diagnosis Date    High cholesterol     Hypertension     Neuropathy    [2] History reviewed. No pertinent surgical history.

## 2025-07-03 ENCOUNTER — APPOINTMENT (OUTPATIENT)
Dept: RADIOLOGY | Facility: MEDICAL CENTER | Age: 75
End: 2025-07-03
Attending: EMERGENCY MEDICINE
Payer: MEDICARE

## 2025-07-03 ENCOUNTER — HOSPITAL ENCOUNTER (EMERGENCY)
Facility: MEDICAL CENTER | Age: 75
End: 2025-07-03
Attending: EMERGENCY MEDICINE
Payer: MEDICARE

## 2025-07-03 VITALS
DIASTOLIC BLOOD PRESSURE: 83 MMHG | TEMPERATURE: 97.9 F | WEIGHT: 212.96 LBS | HEIGHT: 73 IN | SYSTOLIC BLOOD PRESSURE: 119 MMHG | RESPIRATION RATE: 16 BRPM | HEART RATE: 73 BPM | OXYGEN SATURATION: 92 % | BODY MASS INDEX: 28.22 KG/M2

## 2025-07-03 DIAGNOSIS — R10.84 GENERALIZED ABDOMINAL PAIN: Primary | ICD-10-CM

## 2025-07-03 LAB
ALBUMIN SERPL BCP-MCNC: 4.2 G/DL (ref 3.2–4.9)
ALBUMIN/GLOB SERPL: 1.3 G/DL
ALP SERPL-CCNC: 90 U/L (ref 30–99)
ALT SERPL-CCNC: 37 U/L (ref 2–50)
ANION GAP SERPL CALC-SCNC: 16 MMOL/L (ref 7–16)
APPEARANCE UR: CLEAR
AST SERPL-CCNC: 36 U/L (ref 12–45)
BASOPHILS # BLD AUTO: 0.8 % (ref 0–1.8)
BASOPHILS # BLD: 0.07 K/UL (ref 0–0.12)
BILIRUB SERPL-MCNC: 1.9 MG/DL (ref 0.1–1.5)
BILIRUB UR QL STRIP.AUTO: NEGATIVE
BUN SERPL-MCNC: 18 MG/DL (ref 8–22)
CALCIUM ALBUM COR SERPL-MCNC: 8.8 MG/DL (ref 8.5–10.5)
CALCIUM SERPL-MCNC: 9 MG/DL (ref 8.5–10.5)
CHLORIDE SERPL-SCNC: 104 MMOL/L (ref 96–112)
CO2 SERPL-SCNC: 17 MMOL/L (ref 20–33)
COLOR UR: YELLOW
CREAT SERPL-MCNC: 1.3 MG/DL (ref 0.5–1.4)
EOSINOPHIL # BLD AUTO: 0.16 K/UL (ref 0–0.51)
EOSINOPHIL NFR BLD: 1.8 % (ref 0–6.9)
ERYTHROCYTE [DISTWIDTH] IN BLOOD BY AUTOMATED COUNT: 43 FL (ref 35.9–50)
GFR SERPLBLD CREATININE-BSD FMLA CKD-EPI: 57 ML/MIN/1.73 M 2
GLOBULIN SER CALC-MCNC: 3.3 G/DL (ref 1.9–3.5)
GLUCOSE SERPL-MCNC: 108 MG/DL (ref 65–99)
GLUCOSE UR STRIP.AUTO-MCNC: NEGATIVE MG/DL
HCT VFR BLD AUTO: 48.2 % (ref 42–52)
HGB BLD-MCNC: 16.8 G/DL (ref 14–18)
IMM GRANULOCYTES # BLD AUTO: 0.03 K/UL (ref 0–0.11)
IMM GRANULOCYTES NFR BLD AUTO: 0.3 % (ref 0–0.9)
KETONES UR STRIP.AUTO-MCNC: NEGATIVE MG/DL
LEUKOCYTE ESTERASE UR QL STRIP.AUTO: NEGATIVE
LIPASE SERPL-CCNC: 23 U/L (ref 11–82)
LYMPHOCYTES # BLD AUTO: 0.87 K/UL (ref 1–4.8)
LYMPHOCYTES NFR BLD: 9.6 % (ref 22–41)
MCH RBC QN AUTO: 32 PG (ref 27–33)
MCHC RBC AUTO-ENTMCNC: 34.9 G/DL (ref 32.3–36.5)
MCV RBC AUTO: 91.8 FL (ref 81.4–97.8)
MICRO URNS: NORMAL
MONOCYTES # BLD AUTO: 0.51 K/UL (ref 0–0.85)
MONOCYTES NFR BLD AUTO: 5.6 % (ref 0–13.4)
NEUTROPHILS # BLD AUTO: 7.41 K/UL (ref 1.82–7.42)
NEUTROPHILS NFR BLD: 81.9 % (ref 44–72)
NITRITE UR QL STRIP.AUTO: NEGATIVE
NRBC # BLD AUTO: 0 K/UL
NRBC BLD-RTO: 0 /100 WBC (ref 0–0.2)
PH UR STRIP.AUTO: 7 [PH] (ref 5–8)
PLATELET # BLD AUTO: 199 K/UL (ref 164–446)
PMV BLD AUTO: 10.7 FL (ref 9–12.9)
POTASSIUM SERPL-SCNC: 4 MMOL/L (ref 3.6–5.5)
PROT SERPL-MCNC: 7.5 G/DL (ref 6–8.2)
PROT UR QL STRIP: NEGATIVE MG/DL
RBC # BLD AUTO: 5.25 M/UL (ref 4.7–6.1)
RBC UR QL AUTO: NEGATIVE
SODIUM SERPL-SCNC: 137 MMOL/L (ref 135–145)
SP GR UR STRIP.AUTO: 1.01
UROBILINOGEN UR STRIP.AUTO-MCNC: 0.2 EU/DL
WBC # BLD AUTO: 9.1 K/UL (ref 4.8–10.8)

## 2025-07-03 PROCEDURE — 81003 URINALYSIS AUTO W/O SCOPE: CPT

## 2025-07-03 PROCEDURE — 99285 EMERGENCY DEPT VISIT HI MDM: CPT

## 2025-07-03 PROCEDURE — 700117 HCHG RX CONTRAST REV CODE 255: Mod: UD | Performed by: EMERGENCY MEDICINE

## 2025-07-03 PROCEDURE — 83690 ASSAY OF LIPASE: CPT

## 2025-07-03 PROCEDURE — 36415 COLL VENOUS BLD VENIPUNCTURE: CPT

## 2025-07-03 PROCEDURE — 85025 COMPLETE CBC W/AUTO DIFF WBC: CPT

## 2025-07-03 PROCEDURE — 80053 COMPREHEN METABOLIC PANEL: CPT

## 2025-07-03 PROCEDURE — 700111 HCHG RX REV CODE 636 W/ 250 OVERRIDE (IP): Mod: JZ,UD | Performed by: EMERGENCY MEDICINE

## 2025-07-03 PROCEDURE — 96374 THER/PROPH/DIAG INJ IV PUSH: CPT | Mod: XU

## 2025-07-03 PROCEDURE — 74177 CT ABD & PELVIS W/CONTRAST: CPT

## 2025-07-03 RX ORDER — MORPHINE SULFATE 4 MG/ML
4 INJECTION INTRAVENOUS ONCE
Status: COMPLETED | OUTPATIENT
Start: 2025-07-03 | End: 2025-07-03

## 2025-07-03 RX ADMIN — MORPHINE SULFATE 4 MG: 4 INJECTION, SOLUTION INTRAMUSCULAR; INTRAVENOUS at 10:48

## 2025-07-03 RX ADMIN — IOHEXOL 98 ML: 350 INJECTION, SOLUTION INTRAVENOUS at 12:00

## 2025-07-03 ASSESSMENT — FIBROSIS 4 INDEX: FIB4 SCORE: 2.3

## 2025-07-03 NOTE — ED NOTES
Pt given discharge instructions/ home care instructions explained, pt verbalized understanding of instructions given/pt understands the importance of follow up, pt ambulatory to ER madelaine.

## 2025-07-03 NOTE — ED PROVIDER NOTES
ED Provider Note    CHIEF COMPLAINT  Chief Complaint   Patient presents with    Abdominal Pain     Seen here yesterday for possible UTI - inconclusive and pt was sent home with 7 day supply of abx - bactrim.  Pt now has abd pain. Denies N/V/D.         EXTERNAL RECORDS REVIEWED  Other patient was seen in the emergency department yesterday with painful urination and treated for UTI    HPI/ROS  LIMITATION TO HISTORY   Select: : None  OUTSIDE HISTORIAN(S):  None    Fausto Martin is a 75 y.o. male who presents with history of hypertension and high cholesterol, he was in the emerged part yesterday complaining of burning with urination and was treated for UTI, he returns with worsening symptoms, he now has pain in the left side of his abdomen.  He denies nausea vomiting and diarrhea.    PAST MEDICAL HISTORY   has a past medical history of High cholesterol, Hypertension, and Neuropathy.    SURGICAL HISTORY  patient denies any surgical history    FAMILY HISTORY  History reviewed. No pertinent family history.    SOCIAL HISTORY  Social History     Tobacco Use    Smoking status: Never    Smokeless tobacco: Never   Vaping Use    Vaping status: Never Used   Substance and Sexual Activity    Alcohol use: Not Currently    Drug use: Yes     Types: Inhaled     Comment: [email or web address]    Sexual activity: Not on file       CURRENT MEDICATIONS  Home Medications       Reviewed by Radha Oliver R.N. (Registered Nurse) on 07/03/25 at 0957  Med List Status: Partial     Medication Last Dose Status   amLODIPine (NORVASC) 2.5 MG Tab  Active   atorvastatin (LIPITOR) 20 MG Tab  Active   famotidine (PEPCID) 40 MG Tab  Active   gabapentin (NEURONTIN) 100 MG Cap  Active   linaCLOtide (LINZESS) 145 MCG Cap  Active   omeprazole (PRILOSEC) 20 MG delayed-release capsule  Active   sulfamethoxazole-trimethoprim (BACTRIM DS) 800-160 MG tablet  Active   tamsulosin (FLOMAX) 0.4 MG capsule  Active   vitamin D2, Ergocalciferol, (DRISDOL) 1.25 MG (48453  "UT) Cap capsule  Active                           ALLERGIES  Allergies[1]    PHYSICAL EXAM  VITAL SIGNS: /81   Pulse 93   Temp 36.7 °C (98.1 °F) (Temporal)   Resp 18   Ht 1.854 m (6' 1\")   Wt 96.6 kg (212 lb 15.4 oz)   SpO2 96%   BMI 28.10 kg/m²      Constitutional: Alert.  HENT: No signs of trauma, Bilateral external ears normal, Nose normal.   Eyes: Pupils are equal and reactive, Conjunctiva normal, Non-icteric.   Neck: Normal range of motion, No tenderness, Supple, No stridor.   Lymphatic: No lymphadenopathy noted.   Cardiovascular: Regular rate and rhythm, no murmurs.   Thorax & Lungs: Normal breath sounds, No respiratory distress, No wheezing, No chest tenderness.   Abdomen: Bowel sounds normal, Soft, diffuse tenderness no with no peritoneal signs.  Skin: Warm, Dry, No erythema, No rash.   Extremities: Intact distal pulses, No edema, No tenderness, No cyanosis.  Musculoskeletal: Good range of motion in all major joints. No tenderness to palpation or major deformities noted.   Neurologic: Alert , Normal motor function, Normal sensory function, No focal deficits noted.   Psychiatric: Affect normal, Judgment normal, Mood normal.       LABS    Labs Reviewed   CBC WITH DIFFERENTIAL - Abnormal; Notable for the following components:       Result Value    Neutrophils-Polys 81.90 (*)     Lymphocytes 9.60 (*)     Lymphs (Absolute) 0.87 (*)     All other components within normal limits   COMP METABOLIC PANEL - Abnormal; Notable for the following components:    Co2 17 (*)     Glucose 108 (*)     Total Bilirubin 1.9 (*)     All other components within normal limits   ESTIMATED GFR - Abnormal; Notable for the following components:    GFR (CKD-EPI) 57 (*)     All other components within normal limits   LIPASE   URINALYSIS         RADIOLOGY/PROCEDURES   I have independently interpreted the diagnostic imaging associated with this visit and am waiting the final reading from the radiologist.   My preliminary " interpretation is as follows: No free air CT abdomen pelvis    Radiologist interpretation:  CT-ABDOMEN-PELVIS WITH   Final Result      1.  Mild hepatosplenomegaly.   2.  2.4 cm left lobe hepatic cyst.   3.  Bilateral renal cysts. Bosniak 1.   4.  Colonic diverticulosis.   5.  Moderate atherosclerotic plaquing and mild ectasia of the infrarenal aorta and iliac arteries.             COURSE & MEDICAL DECISION MAKING    ASSESSMENT, COURSE AND PLAN  Care Narrative:     Patient presents with abdominal pain.  CT scan was ordered, labs ordered.  The patient was given 4 mg IV morphine.    12:30 PM UA is negative.  White blood count is normal.  CMP is normal.  CT scan unremarkable.  At this time the patient has abdominal pain of unknown cause.  He will be discharged to return for any worsening symptoms.            ADDITIONAL PROBLEMS MANAGED  Abdominal pain    DISPOSITION AND DISCUSSIONS  I have discussed management of the patient with the following physicians and KIM's: None    Discussion of management with other Q or appropriate source(s): None     Escalation of care considered, and ultimately not performed:acute inpatient care management, however at this time, the patient is most appropriate for outpatient management    Barriers to care at this time, including but not limited to: None.     Decision tools and prescription drugs considered including, but not limited to: Instructions to use MiraLAX as needed for constipation.    The patient will return for new or worsening symptoms and is stable at the time of discharge.    The patient is referred to a primary physician for blood pressure management, diabetic screening, and for all other preventative health concerns.        DISPOSITION:  Patient will be discharged home in stable condition.    FOLLOW UP:  [facility], Emergency Dept  [address] [facility] [address] [phone]    If symptoms worsen    Charmaine Fink M.D.  [address] [place]  [place] [address]  [phone]      As needed      OUTPATIENT MEDICATIONS:  New Prescriptions    No medications on file         FINAL DIAGNOSIS  1. Generalized abdominal pain         Electronically signed by: Samson Hoffman M.D., 7/3/2025 10:30 AM           [1] No Known Allergies

## 2025-07-04 LAB
BACTERIA UR CULT: NORMAL
SIGNIFICANT IND 70042: NORMAL
SITE SITE: NORMAL
SOURCE SOURCE: NORMAL

## 2025-07-08 ENCOUNTER — PHARMACY VISIT (OUTPATIENT)
Dept: PHARMACY | Facility: MEDICAL CENTER | Age: 75
End: 2025-07-08
Payer: COMMERCIAL

## 2025-07-08 ENCOUNTER — HOSPITAL ENCOUNTER (EMERGENCY)
Facility: MEDICAL CENTER | Age: 75
End: 2025-07-08
Attending: EMERGENCY MEDICINE
Payer: MEDICARE

## 2025-07-08 VITALS
RESPIRATION RATE: 18 BRPM | BODY MASS INDEX: 28.01 KG/M2 | HEART RATE: 75 BPM | WEIGHT: 212.3 LBS | OXYGEN SATURATION: 92 % | SYSTOLIC BLOOD PRESSURE: 107 MMHG | TEMPERATURE: 97 F | DIASTOLIC BLOOD PRESSURE: 69 MMHG

## 2025-07-08 DIAGNOSIS — B37.9 CANDIDA INFECTION: Primary | ICD-10-CM

## 2025-07-08 PROCEDURE — 99284 EMERGENCY DEPT VISIT MOD MDM: CPT

## 2025-07-08 PROCEDURE — RXMED WILLOW AMBULATORY MEDICATION CHARGE: Performed by: EMERGENCY MEDICINE

## 2025-07-08 RX ORDER — NYSTATIN 100000 [USP'U]/ML
500000 SUSPENSION ORAL 4 TIMES DAILY
Qty: 200 ML | Refills: 0 | Status: SHIPPED | OUTPATIENT
Start: 2025-07-08 | End: 2025-07-18

## 2025-07-08 ASSESSMENT — LIFESTYLE VARIABLES
EVER HAD A DRINK FIRST THING IN THE MORNING TO STEADY YOUR NERVES TO GET RID OF A HANGOVER: NO
TOTAL SCORE: 0
EVER FELT BAD OR GUILTY ABOUT YOUR DRINKING: NO
DO YOU DRINK ALCOHOL: NO
HAVE YOU EVER FELT YOU SHOULD CUT DOWN ON YOUR DRINKING: NO
TOTAL SCORE: 0
CONSUMPTION TOTAL: INCOMPLETE
TOTAL SCORE: 0
HAVE PEOPLE ANNOYED YOU BY CRITICIZING YOUR DRINKING: NO

## 2025-07-08 ASSESSMENT — FIBROSIS 4 INDEX: FIB4 SCORE: 2.23

## 2025-07-08 NOTE — ED NOTES
Pt discharge home. Pt given discharge instructions and medication Pt verbalized understanding, all questions answered ,vss upon d/c.

## 2025-07-08 NOTE — DISCHARGE INSTRUCTIONS
You have oral lesions in your mouth associated with the antibiotic you are taking.  Please start the antifungal medication as soon as possible.  Please discontinue the antibiotic.    If you develop any skin rash or swelling in your mouth, fevers or are not improving please return to the ER immediately.

## 2025-07-08 NOTE — ED TRIAGE NOTES
"Fausto Martin  75 y.o. male  Chief Complaint   Patient presents with    Mouth Pain     Reports 8/10 mouth pain that began \"several days ago.\"  Pt states, \"I have an infection in my gums, my tongue, and my mouth.\"       Pt to triage via wheelchair for above complaint. Pt up self to scale.  Pt is alert and oriented, speaking in full sentences, follows commands and responds appropriately to questions. Not in any apparent distress. Respirations are even and unlabored.  Pt placed in ED Lobby. Pt educated on triage process. Pt encouraged to alert staff for any changes.    "

## 2025-07-08 NOTE — ED PROVIDER NOTES
"ED PHYSICIAN NOTE    CHIEF COMPLAINT  Chief Complaint   Patient presents with    Mouth Pain     Reports 8/10 mouth pain that began \"several days ago.\"  Pt states, \"I have an infection in my gums, my tongue, and my mouth.\"       EXTERNAL RECORDS REVIEWED  Patient seen in the ED 7/2.  Patient was prescribed Bactrim for urinary tract infection.  Culture has since returned negative.  Patient has since been seen in the emergency department on 7/2.  Underwent medical evaluation including CT scan that was negative.    HPI/ROS      Fausto Martin is a 75 y.o. male who presents with sores in his mouth.  These started 2 days ago.  He does not have rash otherwise.  No fever.  No swelling, difficulty breathing, change in voice.    Patient reports that the abdominal pain for which he was started on antibiotic has resolved.    PAST MEDICAL HISTORY  Past Medical History[1]    SOCIAL HISTORY  Social History[2]    CURRENT MEDICATIONS  Home Medications       Reviewed by Tania Gatica R.N. (Registered Nurse) on 07/08/25 at 0928  Med List Status: Not Addressed     Medication Last Dose Status   amLODIPine (NORVASC) 2.5 MG Tab  Active   atorvastatin (LIPITOR) 20 MG Tab  Active   famotidine (PEPCID) 40 MG Tab  Active   gabapentin (NEURONTIN) 100 MG Cap  Active   linaCLOtide (LINZESS) 145 MCG Cap  Active   omeprazole (PRILOSEC) 20 MG delayed-release capsule  Active   sulfamethoxazole-trimethoprim (BACTRIM DS) 800-160 MG tablet  Active   tamsulosin (FLOMAX) 0.4 MG capsule  Active   vitamin D2, Ergocalciferol, (DRISDOL) 1.25 MG (44250 UT) Cap capsule  Active                    ALLERGIES  Allergies[3]    PHYSICAL EXAM  VITAL SIGNS: BP (!) 142/73   Pulse 89   Temp 35.9 °C (96.6 °F) (Temporal)   Resp 16   Wt 96.3 kg (212 lb 4.9 oz)   SpO2 97%   BMI 28.01 kg/m²    Constitutional: Awake and alert  HENT: White plaque-like lesions along the margins and tip of the tongue.  On scraping with a tongue blade scant bleeding from the margins " of these lesions.  The upper and lower gingiva inflamed without any white plaque.  No lesions over the buccal mucosa pharynx is normal.  Airway widely patent.  Eyes: Normal inspection  Neck: Grossly normal range of motion.  Cardiovascular: Normal heart rate  Thorax & Lungs: No respiratory distress  Extremities: Well perfused.  No skin lesions on the palms or soles.  No rash.  Neurologic: Grossly normal   Psychiatric: Normal for situation      COURSE & MEDICAL DECISION MAKING      INITIAL ASSESSMENT, COURSE AND PLAN  Care Narrative: Patient presents with oral lesions as described above.  These appear most compatible with Candida, but they are more adherent than I would expect.  There is some ulcerative component.  The patient is currently on Bactrim and SJS is within the differential although less likely given there is no rash or blistering.  Regardless I do not see any reason to continue the Bactrim.  Antibiotic will be discontinued.  He has no systemic symptoms no fever.  No skin sloughing.  No indication for further emergency treatment should SJS be the case.  Suspected Candida will be treated with nystatin.  I have given strict precautions for the patient to return to the ER if he has any difficulty breathing, swelling, rash, fevers or concern.        DISPOSITION AND DISCUSSIONS    Escalation of care considered, and ultimately not performed:blood analysis and diagnostic imaging      Prescription drugs considered and/or prescribed: Prescribed nystatin    FINAL IMPRESSION  1.  Oral lesions, suspected oral thrush    This dictation was created using voice recognition software. The accuracy of the dictation is limited to the abilities of the software. I expect there may be some errors of grammar and possibly content. The nursing notes were reviewed and certain aspects of this information were incorporated into this note.    Electronically signed by: Dony Castillo M.D., 7/8/2025           [1]   Past Medical  History:  Diagnosis Date    High cholesterol     Hypertension     Neuropathy    [2]   Social History  Tobacco Use    Smoking status: Never    Smokeless tobacco: Never   Vaping Use    Vaping status: Never Used   Substance Use Topics    Alcohol use: Not Currently    Drug use: Yes     Types: Inhaled     Comment: xiang   [3] No Known Allergies